# Patient Record
Sex: FEMALE | Race: WHITE | NOT HISPANIC OR LATINO | Employment: OTHER | URBAN - METROPOLITAN AREA
[De-identification: names, ages, dates, MRNs, and addresses within clinical notes are randomized per-mention and may not be internally consistent; named-entity substitution may affect disease eponyms.]

---

## 2017-04-18 ENCOUNTER — OFFICE VISIT (OUTPATIENT)
Dept: DERMATOLOGY | Facility: CLINIC | Age: 27
End: 2017-04-18
Payer: COMMERCIAL

## 2017-04-18 VITALS — OXYGEN SATURATION: 100 % | HEART RATE: 63 BPM | DIASTOLIC BLOOD PRESSURE: 77 MMHG | SYSTOLIC BLOOD PRESSURE: 113 MMHG

## 2017-04-18 DIAGNOSIS — L70.0 ACNE VULGARIS: Primary | ICD-10-CM

## 2017-04-18 PROCEDURE — 99203 OFFICE O/P NEW LOW 30 MIN: CPT | Performed by: PHYSICIAN ASSISTANT

## 2017-04-18 RX ORDER — TRETINOIN 0.25 MG/G
CREAM TOPICAL
Qty: 45 G | Refills: 11 | Status: SHIPPED | OUTPATIENT
Start: 2017-04-18 | End: 2019-05-07

## 2017-04-18 RX ORDER — DOXYCYCLINE 100 MG/1
CAPSULE ORAL
Qty: 60 CAPSULE | Refills: 2 | Status: SHIPPED | OUTPATIENT
Start: 2017-04-18 | End: 2018-03-13

## 2017-04-18 RX ORDER — SPIRONOLACTONE 100 MG/1
100 TABLET, FILM COATED ORAL DAILY
Qty: 90 TABLET | Refills: 1 | Status: SHIPPED | OUTPATIENT
Start: 2017-04-18 | End: 2017-08-24

## 2017-04-18 NOTE — PATIENT INSTRUCTIONS
Directions for acne:    AM    1. Wash with gentle cleanser - Cetaphil, CeraVe, Neutrogena Ultra Gentle  2. Moisturizer with SPF  3. Makeup    PM    1. Wash again  2. Tretinoin 0.025% cream - pea size to entire face  3. Moisturizer over    Start doxycycline (antibiotic) - twice per day with food and full glass of water for 3 months, then stop  Start spironolactone (blood pressure/anti-testosterone pill) - once per day

## 2017-04-18 NOTE — MR AVS SNAPSHOT
"              After Visit Summary   4/18/2017    Kaci Reyes    MRN: [id number]           Patient Information     Date Of Birth          1990        Visit Information        Provider Department      4/18/2017 8:15 AM Geeta Gallagher PA-C [facility]        Today's Diagnoses     Acne vulgaris    -  1      Care Instructions    Directions for acne:    AM    1. Wash with gentle cleanser - Cetaphil, CeraVe, Neutrogena Ultra Gentle  2. Moisturizer with SPF  3. Makeup    PM    1. Wash again  2. Tretinoin 0.025% cream - pea size to entire face  3. Moisturizer over    Start doxycycline (antibiotic) - twice per day with food and full glass of water for 3 months, then stop  Start spironolactone (blood pressure/anti-testosterone pill) - once per day        Follow-ups after your visit        Who to contact     If you have questions or need follow up information about today's clinic visit or your schedule please contact [facility] directly at [phone].  Normal or non-critical lab and imaging results will be communicated to you by CloudOnhart, letter or phone within 4 business days after the clinic has received the results. If you do not hear from us within 7 days, please contact the clinic through Handpayt or phone. If you have a critical or abnormal lab result, we will notify you by phone as soon as possible.  Submit refill requests through [employer]ux[employer] or call your pharmacy and they will forward the refill request to us. Please allow 3 business days for your refill to be completed.          Additional Information About Your Visit        [employer] Information     [employer]ux[employer] lets you send messages to your doctor, view your test results, renew your prescriptions, schedule appointments and more. To sign up, go to [email or web address] . Click on \"Log in\" on the left side of the screen, which will take you to the Welcome page. Then click on \"Sign up Now\" on the right side of the " page.     You will be asked to enter the access code listed below, as well as some personal information. Please follow the directions to create your username and password.     Your access code is: [email or web address]  Expires: 2017  8:39 AM     Your access code will  in 90 days. If you need help or a new code, please call your [place] clinic or [phone].        Care EveryWhere ID     This is your Care EveryWhere ID. This could be used by other organizations to access your [place] medical records  [id number]        Your Vitals Were     Pulse Pulse Oximetry                63 100%           Blood Pressure from Last 3 Encounters:   17 113/77    Weight from Last 3 Encounters:   No data found for Wt              Today, you had the following     No orders found for display         Today's Medication Changes          These changes are accurate as of: 17  8:39 AM.  If you have any questions, ask your nurse or doctor.               Start taking these medicines.        Dose/Directions    doxycycline Monohydrate 100 MG Caps   Used for:  Acne vulgaris   Started by:  Geeta Gallagher PA-C        1 tab PO BID with food and full glass of water   Quantity:  60 capsule   Refills:  2       spironolactone 100 MG tablet   Commonly known as:  ALDACTONE   Used for:  Acne vulgaris   Started by:  Geeta Gallagher PA-C        Dose:  100 mg   Take 1 tablet (100 mg) by mouth daily   Quantity:  90 tablet   Refills:  1       tretinoin 0.025 % cream   Commonly known as:  RETIN-A   Used for:  Acne vulgaris   Started by:  Geeta Gallagher PA-C        Spread a pea size amount into affected area topically at bedtime.  Use sunscreen SPF>20.   Quantity:  45 g   Refills:  11            Where to get your medicines      These medications were sent to [id number] - [place], [place] - [address] AT [facility] [address] [facility] [address]    Hours:  24-hours Phone:  [phone]      doxycycline Monohydrate 100 MG Caps    spironolactone 100 MG tablet    tretinoin 0.025 % cream                Primary Care Provider    None Specified       No primary provider on file.        Thank you!     Thank you for choosing [facility]  for your care. Our goal is always to provide you with excellent care. Hearing back from our patients is one way we can continue to improve our services. Please take a few minutes to complete the written survey that you may receive in the mail after your visit with us. Thank you!             Your Updated Medication List - Protect others around you: Learn how to safely use, store and throw away your medicines at www.disposemymeds.org.          This list is accurate as of: 4/18/17  8:39 AM.  Always use your most recent med list.                   Brand Name Dispense Instructions for use    doxycycline Monohydrate 100 MG Caps     60 capsule    1 tab PO BID with food and full glass of water       spironolactone 100 MG tablet    ALDACTONE    90 tablet    Take 1 tablet (100 mg) by mouth daily       tretinoin 0.025 % cream    RETIN-A    45 g    Spread a pea size amount into affected area topically at bedtime.  Use sunscreen SPF>20.

## 2017-04-18 NOTE — PROGRESS NOTES
HPI:   Kaci Reyes is a 26 year old female who presents for acne.  chief complaint  Condition has been present for: many years but has gotten worse over the past 6 months  Pt complains of pain: Yes     Previous treatments include: OTC only  Menstrual cycles are regular: Yes   Condition flares around period: Yes - has Mirena IUD  Areas Involved: face  IPLEDGE #:   School:   No current outpatient prescriptions on file.     Allergies   Allergen Reactions     Augmentin Hives     Denies any other skin complaints, in general feels well: Yes  Review of symptoms otherwise negative:Yes    PHYSICAL EXAM:   A&Ox3: Yes   Well developed/well nourished female Yes   Mood appropriate Yes        Type 2 skin. Mood appropriate  Alert and Oriented X 3. Well developed, well nourished in no distress.  General appearance: Normal  Head including face: Normal  Eyes: conjunctiva and lids: Normal  Mouth: Lips, teeth, gums: Normal  Neck: Normal  Back: clear  Cardiovascular: Exam of peripheral vascular system by observation for swelling, varicosities, edema: Normal  Extremities: digits/nails (clubbing): Normal  Right upper extremity: Normal  Left upper extremity: Normal  Right lower extremity: Normal  Left lower extremity: Normal  Skin: Scalp and body hair: See below     Comedones Papules/Pustules Cysts Staining Scarring   Face/Neck 1+ 3 0 1+ 0   Chest 0 0 0 0 0   Back 0 0 0 0 0     Telangiectasias: No Fixed Erythema: No Exoriations: No   Other Physical Exam Findings:    ASSESSMENT & PLAN:     1. Acne Vulgaris - advised on diagnosis and treatment options. Discussed use of topical medications and antibiotics. Tends to flare with menses; has the Mirena IUD. Tends to be more dry than oily; not overly sensitive. Discussed spironolactone, abx and topicals - she is amenable to all.   --Start spironolactone 100 mg daily. Advised on potential for hypotension, teratogenetic effects, menstrual irregularities, hyperkalemia and need for Q 6 month K+  checks.   --Start doxycycline 100 mg BID x 3 months. Advised to take with food. Discussed risk of GI upset, esophagitis and photosensitivity.   --Start tretinoin 0.025% cream QHS          Pt advised on use and risks including photosensitivity, allergic reactions, GI upset, headaches, nausea, erythema, scaling, vertigo, asthralgias, blood clots:Yes    Follow-up: 2 months  CC:   Scribed By: Geeta Gallagher MS, PA-C

## 2017-06-20 ENCOUNTER — OFFICE VISIT (OUTPATIENT)
Dept: DERMATOLOGY | Facility: CLINIC | Age: 27
End: 2017-06-20
Payer: COMMERCIAL

## 2017-06-20 VITALS — OXYGEN SATURATION: 99 % | DIASTOLIC BLOOD PRESSURE: 65 MMHG | HEART RATE: 58 BPM | SYSTOLIC BLOOD PRESSURE: 98 MMHG

## 2017-06-20 DIAGNOSIS — L70.0 ACNE VULGARIS: Primary | ICD-10-CM

## 2017-06-20 PROCEDURE — 99213 OFFICE O/P EST LOW 20 MIN: CPT | Performed by: PHYSICIAN ASSISTANT

## 2017-06-20 RX ORDER — TRETINOIN 0.5 MG/G
CREAM TOPICAL
Qty: 45 G | Refills: 11 | Status: SHIPPED | OUTPATIENT
Start: 2017-06-20 | End: 2019-05-07

## 2017-06-20 RX ORDER — SULFAMETHOXAZOLE/TRIMETHOPRIM 800-160 MG
TABLET ORAL
Qty: 60 TABLET | Refills: 2 | Status: SHIPPED | OUTPATIENT
Start: 2017-06-20 | End: 2018-03-13

## 2017-06-20 RX ORDER — SPIRONOLACTONE 50 MG/1
50 TABLET, FILM COATED ORAL 2 TIMES DAILY
Qty: 90 TABLET | Refills: 1 | Status: SHIPPED | OUTPATIENT
Start: 2017-06-20 | End: 2017-12-19

## 2017-06-20 NOTE — MR AVS SNAPSHOT
"              After Visit Summary   6/20/2017    Kaci Reyes    MRN: [id number]           Patient Information     Date Of Birth          1990        Visit Information        Provider Department      6/20/2017 8:15 AM Geeta Gallagher PA-C [facility]        Today's Diagnoses     Acne vulgaris    -  1       Follow-ups after your visit        Your next 10 appointments already scheduled     Aug 24, 2017  8:00 AM CDT   [facility] Visit with Geeta Gallagher PA-C   [facility]    [address] [id number] [phone]              Who to contact     If you have questions or need follow up information about today's clinic visit or your schedule please contact [facility] directly at [phone].  Normal or non-critical lab and imaging results will be communicated to you by MyChart, letter or phone within 4 business days after the clinic has received the results. If you do not hear from us within 7 days, please contact the clinic through MyChart or phone. If you have a critical or abnormal lab result, we will notify you by phone as soon as possible.  Submit refill requests through [employer] or call your pharmacy and they will forward the refill request to us. Please allow 3 business days for your refill to be completed.          Additional Information About Your Visit        MyChart Information     [employer] lets you send messages to your doctor, view your test results, renew your prescriptions, schedule appointments and more. To sign up, go to [email or web address] . Click on \"Log in\" on the left side of the screen, which will take you to the Welcome page. Then click on \"Sign up Now\" on the right side of the page.     You will be asked to enter the access code listed below, as well as some personal information. Please follow the directions to create your username and password.     Your " access code is: [email or web address]  Expires: 2017  8:39 AM     Your access code will  in 90 days. If you need help or a new code, please call your [place] clinic or [phone].        Care EveryWhere ID     This is your Care EveryWhere ID. This could be used by other organizations to access your [place] medical records  [id number]        Your Vitals Were     Pulse Pulse Oximetry                58 99%           Blood Pressure from Last 3 Encounters:   17 98/65   17 113/77    Weight from Last 3 Encounters:   No data found for Wt              Today, you had the following     No orders found for display         Today's Medication Changes          These changes are accurate as of: 17 10:32 AM.  If you have any questions, ask your nurse or doctor.               Start taking these medicines.        Dose/Directions    sulfamethoxazole-trimethoprim 800-160 MG per tablet   Commonly known as:  BACTRIM DS   Used for:  Acne vulgaris   Started by:  Geeta Gallagher PA-C        1 tab PO BID   Quantity:  60 tablet   Refills:  2         These medicines have changed or have updated prescriptions.        Dose/Directions    * spironolactone 100 MG tablet   Commonly known as:  ALDACTONE   This may have changed:  Another medication with the same name was added. Make sure you understand how and when to take each.   Used for:  Acne vulgaris   Changed by:  Geeta Gallagher PA-C        Dose:  100 mg   Take 1 tablet (100 mg) by mouth daily   Quantity:  90 tablet   Refills:  1       * spironolactone 50 MG tablet   Commonly known as:  ALDACTONE   This may have changed:  You were already taking a medication with the same name, and this prescription was added. Make sure you understand how and when to take each.   Used for:  Acne vulgaris   Changed by:  Geeta Gallagher PA-C        Dose:  50 mg   Take 1 tablet (50 mg) by mouth 2 times daily   Quantity:  90 tablet   Refills:  1       * tretinoin 0.025 % cream    Commonly known as:  RETIN-A   This may have changed:  Another medication with the same name was added. Make sure you understand how and when to take each.   Used for:  Acne vulgaris   Changed by:  Geeta Gallagher PA-C        Spread a pea size amount into affected area topically at bedtime.  Use sunscreen SPF>20.   Quantity:  45 g   Refills:  11       * tretinoin 0.05 % cream   Commonly known as:  RETIN-A   This may have changed:  You were already taking a medication with the same name, and this prescription was added. Make sure you understand how and when to take each.   Used for:  Acne vulgaris   Changed by:  Geeta Gallagher PA-C        Spread a pea size amount into affected area topically at bedtime.  Use sunscreen SPF>20.   Quantity:  45 g   Refills:  11       * Notice:  This list has 4 medication(s) that are the same as other medications prescribed for you. Read the directions carefully, and ask your doctor or other care provider to review them with you.         Where to get your medicines      These medications were sent to [facility] [address] [place] [address] [address] [facility] [address]  [address] [facility] [address]    Hours:  24-hours Phone:  [phone]     spironolactone 50 MG tablet    sulfamethoxazole-trimethoprim 800-160 MG per tablet    tretinoin 0.05 % cream                Primary Care Provider    None Specified       No primary provider on file.        Thank you!     Thank you for choosing [facility]  for your care. Our goal is always to provide you with excellent care. Hearing back from our patients is one way we can continue to improve our services. Please take a few minutes to complete the written survey that you may receive in the mail after your visit with us. Thank you!             Your Updated Medication List - Protect others around you: Learn how to safely use, store and throw away your medicines at  www.disposemymeds.org.          This list is accurate as of: 6/20/17 10:32 AM.  Always use your most recent med list.                   Brand Name Dispense Instructions for use    doxycycline Monohydrate 100 MG Caps     60 capsule    1 tab PO BID with food and full glass of water       * spironolactone 100 MG tablet    ALDACTONE    90 tablet    Take 1 tablet (100 mg) by mouth daily       * spironolactone 50 MG tablet    ALDACTONE    90 tablet    Take 1 tablet (50 mg) by mouth 2 times daily       sulfamethoxazole-trimethoprim 800-160 MG per tablet    BACTRIM DS    60 tablet    1 tab PO BID       * tretinoin 0.025 % cream    RETIN-A    45 g    Spread a pea size amount into affected area topically at bedtime.  Use sunscreen SPF>20.       * tretinoin 0.05 % cream    RETIN-A    45 g    Spread a pea size amount into affected area topically at bedtime.  Use sunscreen SPF>20.       * Notice:  This list has 4 medication(s) that are the same as other medications prescribed for you. Read the directions carefully, and ask your doctor or other care provider to review them with you.

## 2017-06-20 NOTE — PROGRESS NOTES
HPI:   Kaci Reyes is a 26 year old female who presents for recheck of acne. On spironolactone and doxycycline - not seeing much improvement.   chief complaint  Condition has been present for: many years but has gotten worse over the past 8 months  Pt complains of pain: Yes     Previous treatments include: OTC only  Menstrual cycles are regular: Yes   Condition flares around period: Yes - has Mirena IUD  Areas Involved: face  IPLEDGE #:   School:   Current Outpatient Prescriptions   Medication Sig Dispense Refill     spironolactone (ALDACTONE) 50 MG tablet Take 1 tablet (50 mg) by mouth 2 times daily 90 tablet 1     sulfamethoxazole-trimethoprim (BACTRIM DS) 800-160 MG per tablet 1 tab PO BID 60 tablet 2     tretinoin (RETIN-A) 0.05 % cream Spread a pea size amount into affected area topically at bedtime.  Use sunscreen SPF>20. 45 g 11     tretinoin (RETIN-A) 0.025 % cream Spread a pea size amount into affected area topically at bedtime.  Use sunscreen SPF>20. 45 g 11     spironolactone (ALDACTONE) 100 MG tablet Take 1 tablet (100 mg) by mouth daily 90 tablet 1     doxycycline Monohydrate 100 MG CAPS 1 tab PO BID with food and full glass of water 60 capsule 2     Allergies   Allergen Reactions     Augmentin Hives     Denies any other skin complaints, in general feels well: Yes  Review of symptoms otherwise negative:Yes    PHYSICAL EXAM:   A&Ox3: Yes   Well developed/well nourished female Yes   Mood appropriate Yes        Type 2 skin. Mood appropriate  Alert and Oriented X 3. Well developed, well nourished in no distress.  General appearance: Normal  Head including face: Normal  Eyes: conjunctiva and lids: Normal  Mouth: Lips, teeth, gums: Normal  Neck: Normal  Back: clear  Cardiovascular: Exam of peripheral vascular system by observation for swelling, varicosities, edema: Normal  Extremities: digits/nails (clubbing): Normal  Right upper extremity: Normal  Left upper extremity: Normal  Right lower extremity:  Normal  Left lower extremity: Normal  Skin: Scalp and body hair: See below     Comedones Papules/Pustules Cysts Staining Scarring   Face/Neck 1+ 1+ 2 1+ 0   Chest 0 0 0 0 0   Back 0 0 0 0 0     Telangiectasias: No Fixed Erythema: No Exoriations: No   Other Physical Exam Findings:    ASSESSMENT & PLAN:     1. Acne Vulgaris - advised on diagnosis and treatment options. Currently taking spironolactone 100 mg daily and doxycycline. Not having any adverse effects but not seeing improvement in her acne. Is using tretinoin 0.025% cream every night without irritation. Discussed use of topical medications and antibiotics. Tends to flare with menses; has the Mirena IUD. Tends to be more dry than oily; not overly sensitive. Discussed spironolactone, abx and topicals - she is amenable to all.   --Increase to spironolactone 150 mg daily. She is currently training for a marathon. Discussed need for adequate hydration and potential for hypotension at length. Advised on potential for hypotension, teratogenetic effects, menstrual irregularities, hyperkalemia and need for Q 6 month K+ checks.   --Start Bactrim DS BID x 3 months. Advised on increased risk of allergic reactions.    --Increase to tretinoin 0.05% cream QHS          Pt advised on use and risks including photosensitivity, allergic reactions, GI upset, headaches, nausea, erythema, scaling, vertigo, asthralgias, blood clots:Yes    Follow-up: 2 months  CC:   Scribed By: Geeta Gallagher, MS, PA-C

## 2017-08-24 ENCOUNTER — OFFICE VISIT (OUTPATIENT)
Dept: DERMATOLOGY | Facility: CLINIC | Age: 27
End: 2017-08-24
Payer: COMMERCIAL

## 2017-08-24 DIAGNOSIS — Z51.81 THERAPEUTIC DRUG MONITORING: Primary | ICD-10-CM

## 2017-08-24 DIAGNOSIS — L70.0 ACNE VULGARIS: ICD-10-CM

## 2017-08-24 LAB — POTASSIUM SERPL-SCNC: 4.1 MMOL/L (ref 3.4–5.3)

## 2017-08-24 PROCEDURE — 84132 ASSAY OF SERUM POTASSIUM: CPT | Performed by: PHYSICIAN ASSISTANT

## 2017-08-24 PROCEDURE — 99213 OFFICE O/P EST LOW 20 MIN: CPT | Performed by: PHYSICIAN ASSISTANT

## 2017-08-24 PROCEDURE — 36415 COLL VENOUS BLD VENIPUNCTURE: CPT | Performed by: PHYSICIAN ASSISTANT

## 2017-08-24 RX ORDER — SPIRONOLACTONE 100 MG/1
100 TABLET, FILM COATED ORAL DAILY
Qty: 90 TABLET | Refills: 1 | Status: SHIPPED | OUTPATIENT
Start: 2017-08-24 | End: 2017-12-19

## 2017-08-24 NOTE — MR AVS SNAPSHOT
"              After Visit Summary   8/24/2017    Kaci Reyes    MRN: [id number]           Patient Information     Date Of Birth          1990        Visit Information        Provider Department      8/24/2017 8:00 AM Geeta Gallagher PA-C [facility]        Today's Diagnoses     Therapeutic drug monitoring    -  1    Acne vulgaris           Follow-ups after your visit        Your next 10 appointments already scheduled     Feb 27, 2018 11:45 AM CST   Return Visit with Geeta Gallagher PA-C   [facility]    [address] [id number] [phone]              Who to contact     If you have questions or need follow up information about today's clinic visit or your schedule please contact [facility] directly at [phone].  Normal or non-critical lab and imaging results will be communicated to you by MyChart, letter or phone within 4 business days after the clinic has received the results. If you do not hear from us within 7 days, please contact the clinic through MyChart or phone. If you have a critical or abnormal lab result, we will notify you by phone as soon as possible.  Submit refill requests through [employer] or call your pharmacy and they will forward the refill request to us. Please allow 3 business days for your refill to be completed.          Additional Information About Your Visit        MyChart Information     [employer] lets you send messages to your doctor, view your test results, renew your prescriptions, schedule appointments and more. To sign up, go to [email or web address] . Click on \"Log in\" on the left side of the screen, which will take you to the Welcome page. Then click on \"Sign up Now\" on the right side of the page.     You will be asked to enter the access code listed below, as well as some personal information. Please follow the directions to create " your username and password.     Your access code is: [email or web address]  Expires: 2017  8:19 AM     Your access code will  in 90 days. If you need help or a new code, please call your [place] clinic or [phone].        Care EveryWhere ID     This is your Care EveryWhere ID. This could be used by other organizations to access your [place] medical records  [id number]         Blood Pressure from Last 3 Encounters:   17 98/65   17 113/77    Weight from Last 3 Encounters:   No data found for Wt              We Performed the Following     Potassium  Serum ([facility])          Where to get your medicines      These medications were sent to [facility] - [facility] [address] AT [facility] [address], [facility] [address]    Hours:  24-hours Phone:  [phone]     spironolactone 100 MG tablet          Primary Care Provider    None Specified       No primary provider on file.        Equal Access to Services     [facility]: Hadii aad ku hadasho Soomaali, waaxda luqadaha, qaybta kaalmada adeegyada, waxay idiin hayferminn lm an . So [facility] [phone].    ATENCIÓN: Si habla español, tiene a conrad disposición servicios gratuitos de asistencia lingüística. Llame al [phone].    We comply with applicable federal civil rights laws and [place] laws. We do not discriminate on the basis of race, color, national origin, age, disability sex, sexual orientation or gender identity.            Thank you!     Thank you for choosing [facility]  for your care. Our goal is always to provide you with excellent care. Hearing back from our patients is one way we can continue to improve our services. Please take a few minutes to complete the written survey that you may receive in the mail after your visit with us. Thank you!             Your Updated Medication List - Protect others around you: Learn how to safely use, store and  throw away your medicines at www.disposemymeds.org.          This list is accurate as of: 8/24/17  8:19 AM.  Always use your most recent med list.                   Brand Name Dispense Instructions for use Diagnosis    doxycycline Monohydrate 100 MG Caps     60 capsule    1 tab PO BID with food and full glass of water    Acne vulgaris       * spironolactone 50 MG tablet    ALDACTONE    90 tablet    Take 1 tablet (50 mg) by mouth 2 times daily    Acne vulgaris       * spironolactone 100 MG tablet    ALDACTONE    90 tablet    Take 1 tablet (100 mg) by mouth daily    Acne vulgaris       sulfamethoxazole-trimethoprim 800-160 MG per tablet    BACTRIM DS    60 tablet    1 tab PO BID    Acne vulgaris       * tretinoin 0.025 % cream    RETIN-A    45 g    Spread a pea size amount into affected area topically at bedtime.  Use sunscreen SPF>20.    Acne vulgaris       * tretinoin 0.05 % cream    RETIN-A    45 g    Spread a pea size amount into affected area topically at bedtime.  Use sunscreen SPF>20.    Acne vulgaris       * Notice:  This list has 4 medication(s) that are the same as other medications prescribed for you. Read the directions carefully, and ask your doctor or other care provider to review them with you.

## 2017-08-24 NOTE — PROGRESS NOTES
HPI:   Kaci Reyes is a 26 year old female who presents for recheck of acne. On spironolactone and doxycycline - not seeing much improvement.   chief complaint  Condition has been present for: many years but has gotten worse over the past 8 months  Pt complains of pain: Yes     Previous treatments include: OTC only  Menstrual cycles are regular: Yes   Condition flares around period: Yes - has Mirena IUD  Areas Involved: face  IPLEDGE #:   School:   Current Outpatient Prescriptions   Medication Sig Dispense Refill     spironolactone (ALDACTONE) 50 MG tablet Take 1 tablet (50 mg) by mouth 2 times daily 90 tablet 1     sulfamethoxazole-trimethoprim (BACTRIM DS) 800-160 MG per tablet 1 tab PO BID 60 tablet 2     tretinoin (RETIN-A) 0.05 % cream Spread a pea size amount into affected area topically at bedtime.  Use sunscreen SPF>20. 45 g 11     tretinoin (RETIN-A) 0.025 % cream Spread a pea size amount into affected area topically at bedtime.  Use sunscreen SPF>20. 45 g 11     spironolactone (ALDACTONE) 100 MG tablet Take 1 tablet (100 mg) by mouth daily 90 tablet 1     doxycycline Monohydrate 100 MG CAPS 1 tab PO BID with food and full glass of water 60 capsule 2     Allergies   Allergen Reactions     Augmentin Hives     Denies any other skin complaints, in general feels well: Yes  Review of symptoms otherwise negative:Yes    PHYSICAL EXAM:   A&Ox3: Yes   Well developed/well nourished female Yes   Mood appropriate Yes        Type 2 skin. Mood appropriate  Alert and Oriented X 3. Well developed, well nourished in no distress.  General appearance: Normal  Head including face: Normal  Eyes: conjunctiva and lids: Normal  Mouth: Lips, teeth, gums: Normal  Neck: Normal  Back: clear  Cardiovascular: Exam of peripheral vascular system by observation for swelling, varicosities, edema: Normal  Extremities: digits/nails (clubbing): Normal  Right upper extremity: Normal  Left upper extremity: Normal  Right lower extremity:  Normal  Left lower extremity: Normal  Skin: Scalp and body hair: See below     Comedones Papules/Pustules Cysts Staining Scarring   Face/Neck 0-1+ 3 0 0-1+ 0   Chest 0 0 0 0 0   Back 0 0 0 0 0     Telangiectasias: No Fixed Erythema: No Exoriations: No   Other Physical Exam Findings:    ASSESSMENT & PLAN:     1. Acne Vulgaris - advised on diagnosis and treatment options. Improved on the spironolactone 150 mg. Stopped bactrim a few weeks ago due to yeast infections.  Is using tretinoin 0.05% cream every night without irritation. Discussed use of topical medications and antibiotics. No longer flares with menses; has the Mirena IUD. Tends to be more dry than oily; not overly sensitive. Discussed spironolactone, abx and topicals - she is amenable to all.   --Continue spironolactone 150 mg daily. She is currently training for a marathon. Discussed need for adequate hydration and potential for hypotension at length. Advised on potential for hypotension, teratogenetic effects, menstrual irregularities, hyperkalemia and need for Q 6 month K+ checks.   --Continue tretinoin 0.05% cream QHS  --Can call for abx if flaring; doxy did not work for her.   --Check K+ today          Pt advised on use and risks including photosensitivity, allergic reactions, GI upset, headaches, nausea, erythema, scaling, vertigo, asthralgias, blood clots:Yes    Follow-up: 6 months  CC:   Scribed By: Geeta Gallagher, MS, PA-C

## 2017-11-30 DIAGNOSIS — L70.0 ACNE VULGARIS: ICD-10-CM

## 2017-12-04 RX ORDER — SPIRONOLACTONE 50 MG/1
50 TABLET, FILM COATED ORAL 2 TIMES DAILY
Qty: 90 TABLET | Refills: 1 | OUTPATIENT
Start: 2017-12-04

## 2017-12-04 NOTE — TELEPHONE ENCOUNTER
Duplicate- sent 8/2017 x 6 months- info sent to pharmacy.  Reva JasonRN  [facility]  [phone]    Spironolactone      Last Written Prescription Date: 08/24/17  Last Fill Quantity: 90, # refills: 1  Last Office Visit with [facility], P or [facility] prescribing provider:   Next 5 appointments (look out 90 days)     Feb 27, 2018 11:45 AM CST   Return Visit with Geeta Gallagher PA-C   [facility] ([facility]    [address] [place] [id number] [phone]                   Potassium   Date Value Ref Range Status   08/24/2017 4.1 3.4 - 5.3 mmol/L Final     No results found for: CR  BP Readings from Last 3 Encounters:   06/20/17 98/65   04/18/17 113/77

## 2017-12-19 DIAGNOSIS — L70.0 ACNE VULGARIS: ICD-10-CM

## 2017-12-20 RX ORDER — SPIRONOLACTONE 50 MG/1
50 TABLET, FILM COATED ORAL 2 TIMES DAILY
Qty: 90 TABLET | Refills: 1 | Status: SHIPPED | OUTPATIENT
Start: 2017-12-20 | End: 2019-05-07

## 2017-12-20 RX ORDER — SPIRONOLACTONE 100 MG/1
100 TABLET, FILM COATED ORAL DAILY
Qty: 90 TABLET | Refills: 1 | Status: SHIPPED | OUTPATIENT
Start: 2017-12-20 | End: 2019-05-07

## 2017-12-20 NOTE — TELEPHONE ENCOUNTER
Requested Prescriptions   Pending Prescriptions Disp Refills     spironolactone (ALDACTONE) 100 MG tablet 90 tablet 1     Sig: Take 1 tablet (100 mg) by mouth daily    Diuretics (Including Combos) Protocol Failed    12/19/2017  4:42 PM       Failed - Blood pressure under 140/90    BP Readings from Last 3 Encounters:   06/20/17 98/65   04/18/17 113/77                Failed - Normal serum creatinine on file in past 12 months    No lab results found.          Failed - Normal serum sodium on file in past 12 months    No lab results found.          Passed - Recent or future visit with authorizing provider's specialty    Patient had office visit in the last year or has a visit in the next 30 days with authorizing provider.  See chart review.              Passed - Patient is age 18 or older       Passed - No active pregancy on record       Passed - Normal serum potassium on file in past 12 months    Recent Labs   Lab Test  08/24/17   0829   POTASSIUM  4.1                   Passed - No positive pregnancy test in past 12 months        spironolactone (ALDACTONE) 50 MG tablet 90 tablet 1     Sig: Take 1 tablet (50 mg) by mouth 2 times daily    Diuretics (Including Combos) Protocol Failed    12/19/2017  4:42 PM       Failed - Blood pressure under 140/90    BP Readings from Last 3 Encounters:   06/20/17 98/65   04/18/17 113/77                Failed - Normal serum creatinine on file in past 12 months    No lab results found.          Failed - Normal serum sodium on file in past 12 months    No lab results found.          Passed - Recent or future visit with authorizing provider's specialty    Patient had office visit in the last year or has a visit in the next 30 days with authorizing provider.  See chart review.              Passed - Patient is age 18 or older       Passed - No active pregancy on record       Passed - Normal serum potassium on file in past 12 months    Recent Labs   Lab Test  08/24/17   0829   POTASSIUM  4.1                    Passed - No positive pregnancy test in past 12 months

## 2018-03-13 ENCOUNTER — OFFICE VISIT (OUTPATIENT)
Dept: DERMATOLOGY | Facility: CLINIC | Age: 28
End: 2018-03-13
Payer: COMMERCIAL

## 2018-03-13 VITALS — DIASTOLIC BLOOD PRESSURE: 72 MMHG | HEART RATE: 78 BPM | SYSTOLIC BLOOD PRESSURE: 110 MMHG | OXYGEN SATURATION: 99 %

## 2018-03-13 DIAGNOSIS — L70.0 ACNE VULGARIS: Primary | ICD-10-CM

## 2018-03-13 DIAGNOSIS — Z51.81 THERAPEUTIC DRUG MONITORING: ICD-10-CM

## 2018-03-13 LAB — BETA HCG QUAL IFA URINE: NEGATIVE

## 2018-03-13 PROCEDURE — 84703 CHORIONIC GONADOTROPIN ASSAY: CPT | Performed by: PHYSICIAN ASSISTANT

## 2018-03-13 PROCEDURE — 99213 OFFICE O/P EST LOW 20 MIN: CPT | Performed by: PHYSICIAN ASSISTANT

## 2018-03-13 NOTE — PROGRESS NOTES
HPI:   Kaci Reyes is a 27 year old female who presents for recheck of acne. Improved on spironolactone. No side effects from spironolactone.    chief complaint  Condition has been present for: many years but has gotten worse over the past 8 months  Pt complains of pain: Yes     Previous treatments include: OTC only  Menstrual cycles are regular: Yes   Condition flares around period: Yes - has Mirena IUD  Areas Involved: face  IPLEDGE #: [id number]  School:   Current Outpatient Prescriptions   Medication Sig Dispense Refill     spironolactone (ALDACTONE) 100 MG tablet Take 1 tablet (100 mg) by mouth daily 90 tablet 1     spironolactone (ALDACTONE) 50 MG tablet Take 1 tablet (50 mg) by mouth 2 times daily 90 tablet 1     tretinoin (RETIN-A) 0.05 % cream Spread a pea size amount into affected area topically at bedtime.  Use sunscreen SPF>20. 45 g 11     tretinoin (RETIN-A) 0.025 % cream Spread a pea size amount into affected area topically at bedtime.  Use sunscreen SPF>20. 45 g 11     Allergies   Allergen Reactions     Augmentin Hives     Denies any other skin complaints, in general feels well: Yes  Review of symptoms otherwise negative:Yes    This document serves as a record of the services and decisions personally performed and made by Geeta Gallagher, MS, PA-C. It was created on her behalf by Rose Mary Wild, a trained medical scribe. The creation of this document is based on the provider's statements to the medical scribe.  Rose Mary Wild 10:16 AM March 13, 2018    PHYSICAL EXAM:   A&Ox3: Yes   Well developed/well nourished female Yes   Mood appropriate Yes        Type 2 skin. Mood appropriate  Alert and Oriented X 3. Well developed, well nourished in no distress.  General appearance: Normal  Head including face: Normal  Eyes: conjunctiva and lids: Normal  Mouth: Lips, teeth, gums: Normal  Neck: Normal  Back: clear  Cardiovascular: Exam of peripheral vascular system by observation for swelling,  varicosities, edema: Normal  Extremities: digits/nails (clubbing): Normal  Right upper extremity: Normal  Left upper extremity: Normal  Right lower extremity: Normal  Left lower extremity: Normal  Skin: Scalp and body hair: See below     Comedones Papules/Pustules Cysts Staining Scarring   Face/Neck 0-1+ 3 0 0-1+ 0   Chest 0 0 0 0 0   Back 0 0 0 0 0     Telangiectasias: No Fixed Erythema: No Exoriations: No   Other Physical Exam Findings:    ASSESSMENT & PLAN:     1. Acne Vulgaris - advised on diagnosis and treatment options. Improved on the spironolactone 150 mg but still getting acne monthly with menses. Is using tretinoin 0.05% cream every night without irritation. Discussed use of topical medications and antibiotics. has the Mirena IUD.  Discussed continuing spironolactone, Accutane, abx and topicals - she will consider Accutane - will register today and make final decision in 30 days.     Accutane is discussed fully with the patient. It is a very effective drug to treat acne vulgaris but has many significant side effects. Chief among these are teratogensis, hepatic injury, dyslipidemia and severe drying of the mucous membranes. All of these issues have been discussed in details. Monthly blood tests to monitor lipids and liver functions will be necessary. Expect painful dryness and/or fissuring around the lips, eyes, and other moist areas of the body. Balms may be protective. Contact lens may be too painful to wear temporarily while on this drug. Episodes of significant depression have been reported, including suicidal ideation and attempts in rare cases. It may also cause pseudotumor cerebri and hyperostosis. The patient will report any such changes in mood, depressive symptoms or suicidal thoughts, headaches, joint or bone pains.    Female patients MUST use two simultaneous methods of family planning. Accutane is Category X for pregnancy, meaning it will cause fetal teratogenic malformations, and pregnancy  MUST be avoided while on this drug.    The dose is 0.5-1 mg/kg in two divided doses for 15-20 weeks.    After discussion of these important issues, s/he indicates complete understanding of all of the above, and does wish to proceed with accutane therapy at this time.     --Continue spironolactone 150 mg daily. Discussed need for adequate hydration and potential for hypotension at length. Advised on potential for hypotension, teratogenetic effects, menstrual irregularities, hyperkalemia and need for Q 6 month K+ checks. (will stop if she starts Accutane)  --Continue tretinoin 0.05% cream QHS  --Registered for iPledge today   --Check UCG today, repeat in 1 month   --Follow up in 30+ days   --Ipledge number is [id number]      Pt advised on use and risks including photosensitivity, allergic reactions, GI upset, headaches, nausea, erythema, scaling, vertigo, asthralgias, blood clots:Yes    Follow-up: 1 month  CC:   Scribed By: Rose Mary Wild Medical Scribe    The information in this document, created by the medical scribe for me, accurately reflects the services I personally performed and the decisions made by me. I have reviewed and approved this document for accuracy prior to leaving the patient care area.  March 13, 2018 10:27 AM    Geeta Gallagher MS, PATIA

## 2018-03-13 NOTE — MR AVS SNAPSHOT
"              After Visit Summary   3/13/2018    Kaci Reyes    MRN: [id number]           Patient Information     Date Of Birth          1990        Visit Information        Provider Department      3/13/2018 10:00 AM Geeta Gallagher PA-C [facility]        Today's Diagnoses     Acne vulgaris    -  1    Therapeutic drug monitoring           Follow-ups after your visit        Your next 10 appointments already scheduled     Apr 13, 2018  9:00 AM CDT   Return Visit with Geeta Gallagher PA-C   [facility] ([facility])    [address] [phone]            May 11, 2018  9:00 AM CDT   Return Visit with Geeta Gallagher PA-C   [facility] ([facility])    [address] [place] [phone]              Who to contact     If you have questions or need follow up information about today's clinic visit or your schedule please contact [facility] directly at [phone].  Normal or non-critical lab and imaging results will be communicated to you by MyChart, letter or phone within 4 business days after the clinic has received the results. If you do not hear from us within 7 days, please contact the clinic through MyChart or phone. If you have a critical or abnormal lab result, we will notify you by phone as soon as possible.  Submit refill requests through [employer] or call your pharmacy and they will forward the refill request to us. Please allow 3 business days for your refill to be completed.          Additional Information About Your Visit        MyChart Information     H?REL lets you send messages to your doctor, view your test results, renew your prescriptions, schedule appointments and more. To sign up, go to [email or web address] . Click on \"Log in\" on the left side of the screen, which will take " "you to the Welcome page. Then click on \"Sign up Now\" on the right side of the page.     You will be asked to enter the access code listed below, as well as some personal information. Please follow the directions to create your username and password.     Your access code is: [email or web address]  Expires: 2018  1:46 PM     Your access code will  in 90 days. If you need help or a new code, please call your [facility] clinic or [phone].        Care EveryWhere ID     This is your Care EveryWhere ID. This could be used by other organizations to access your [facility] medical records  [id number]        Your Vitals Were     Pulse Last Period Pulse Oximetry             78 2018 99%          Blood Pressure from Last 3 Encounters:   18 110/72   17 98/65   17 113/77    Weight from Last 3 Encounters:   No data found for Wt              We Performed the Following     Beta HCG qual IFA urine        Primary Care Provider    None Specified       No primary provider on file.        Equal Access to Services     [facility]: Hadii rachana miller hadasho Sowaltali, waaxda luqadaha, qaybta kaalmada adeboaz, samuel an . So [facility] [phone].    ATENCIÓN: Si habla español, tiene a conrad disposición servicios gratuitos de asistencia lingüística. Llame al [phone].    We comply with applicable federal civil rights laws and [place] laws. We do not discriminate on the basis of race, color, national origin, age, disability, sex, sexual orientation, or gender identity.            Thank you!     Thank you for choosing [facility]  for your care. Our goal is always to provide you with excellent care. Hearing back from our patients is one way we can continue to improve our services. Please take a few minutes to complete the written survey that you may receive in the mail after your visit with us. Thank you!             Your Updated Medication List - Protect others " around you: Learn how to safely use, store and throw away your medicines at www.disposemymeds.org.          This list is accurate as of 3/13/18  1:46 PM.  Always use your most recent med list.                   Brand Name Dispense Instructions for use Diagnosis    * spironolactone 100 MG tablet    ALDACTONE    90 tablet    Take 1 tablet (100 mg) by mouth daily    Acne vulgaris       * spironolactone 50 MG tablet    ALDACTONE    90 tablet    Take 1 tablet (50 mg) by mouth 2 times daily    Acne vulgaris       * tretinoin 0.025 % cream    RETIN-A    45 g    Spread a pea size amount into affected area topically at bedtime.  Use sunscreen SPF>20.    Acne vulgaris       * tretinoin 0.05 % cream    RETIN-A    45 g    Spread a pea size amount into affected area topically at bedtime.  Use sunscreen SPF>20.    Acne vulgaris       * Notice:  This list has 4 medication(s) that are the same as other medications prescribed for you. Read the directions carefully, and ask your doctor or other care provider to review them with you.

## 2018-04-13 ENCOUNTER — TELEPHONE (OUTPATIENT)
Dept: DERMATOLOGY | Facility: CLINIC | Age: 28
End: 2018-04-13

## 2018-04-13 ENCOUNTER — OFFICE VISIT (OUTPATIENT)
Dept: DERMATOLOGY | Facility: CLINIC | Age: 28
End: 2018-04-13
Payer: COMMERCIAL

## 2018-04-13 VITALS — DIASTOLIC BLOOD PRESSURE: 70 MMHG | OXYGEN SATURATION: 99 % | SYSTOLIC BLOOD PRESSURE: 116 MMHG | HEART RATE: 77 BPM

## 2018-04-13 DIAGNOSIS — Z51.81 THERAPEUTIC DRUG MONITORING: ICD-10-CM

## 2018-04-13 DIAGNOSIS — L70.0 ACNE VULGARIS: Primary | ICD-10-CM

## 2018-04-13 LAB
ALBUMIN SERPL-MCNC: 4 G/DL (ref 3.4–5)
ALP SERPL-CCNC: 66 U/L (ref 40–150)
ALT SERPL W P-5'-P-CCNC: 17 U/L (ref 0–50)
ANION GAP SERPL CALCULATED.3IONS-SCNC: 6 MMOL/L (ref 3–14)
AST SERPL W P-5'-P-CCNC: 28 U/L (ref 0–45)
BETA HCG QUAL IFA URINE: NEGATIVE
BILIRUB SERPL-MCNC: 1 MG/DL (ref 0.2–1.3)
BUN SERPL-MCNC: 11 MG/DL (ref 7–30)
CALCIUM SERPL-MCNC: 9.1 MG/DL (ref 8.5–10.1)
CHLORIDE SERPL-SCNC: 108 MMOL/L (ref 94–109)
CHOLEST SERPL-MCNC: 171 MG/DL
CO2 SERPL-SCNC: 26 MMOL/L (ref 20–32)
CREAT SERPL-MCNC: 0.96 MG/DL (ref 0.52–1.04)
ERYTHROCYTE [DISTWIDTH] IN BLOOD BY AUTOMATED COUNT: 11.3 % (ref 10–15)
GFR SERPL CREATININE-BSD FRML MDRD: 70 ML/MIN/1.7M2
GLUCOSE SERPL-MCNC: 79 MG/DL (ref 70–99)
HCT VFR BLD AUTO: 42.1 % (ref 35–47)
HDLC SERPL-MCNC: 56 MG/DL
HGB BLD-MCNC: 14.4 G/DL (ref 11.7–15.7)
LDLC SERPL CALC-MCNC: 105 MG/DL
MCH RBC QN AUTO: 33.3 PG (ref 26.5–33)
MCHC RBC AUTO-ENTMCNC: 34.2 G/DL (ref 31.5–36.5)
MCV RBC AUTO: 97 FL (ref 78–100)
NONHDLC SERPL-MCNC: 115 MG/DL
PLATELET # BLD AUTO: 190 10E9/L (ref 150–450)
POTASSIUM SERPL-SCNC: 4 MMOL/L (ref 3.4–5.3)
PROT SERPL-MCNC: 7.8 G/DL (ref 6.8–8.8)
RBC # BLD AUTO: 4.33 10E12/L (ref 3.8–5.2)
SODIUM SERPL-SCNC: 140 MMOL/L (ref 133–144)
TRIGL SERPL-MCNC: 50 MG/DL
WBC # BLD AUTO: 5.3 10E9/L (ref 4–11)

## 2018-04-13 PROCEDURE — 84703 CHORIONIC GONADOTROPIN ASSAY: CPT | Performed by: PHYSICIAN ASSISTANT

## 2018-04-13 PROCEDURE — 80061 LIPID PANEL: CPT | Performed by: PHYSICIAN ASSISTANT

## 2018-04-13 PROCEDURE — 80053 COMPREHEN METABOLIC PANEL: CPT | Performed by: PHYSICIAN ASSISTANT

## 2018-04-13 PROCEDURE — 85027 COMPLETE CBC AUTOMATED: CPT | Performed by: PHYSICIAN ASSISTANT

## 2018-04-13 PROCEDURE — 99213 OFFICE O/P EST LOW 20 MIN: CPT | Performed by: PHYSICIAN ASSISTANT

## 2018-04-13 PROCEDURE — 36415 COLL VENOUS BLD VENIPUNCTURE: CPT | Performed by: PHYSICIAN ASSISTANT

## 2018-04-13 RX ORDER — ISOTRETINOIN 30 MG/1
CAPSULE ORAL
Qty: 30 CAPSULE | Refills: 0 | Status: SHIPPED | OUTPATIENT
Start: 2018-04-13 | End: 2019-05-07

## 2018-04-13 NOTE — TELEPHONE ENCOUNTER
Called and spoke with patient letting her know that all of her labs came back within acceptable limits to start Accutane. Patient voiced understanding.  Bianca RN-BSN  [facility]  [phone]

## 2018-04-13 NOTE — TELEPHONE ENCOUNTER
Notes Recorded by Geeta Gallagher PA-C on 4/13/2018 at 12:43 PM  CBC, CMP and lipid panel area all within acceptable limits to start Accutane. Please notify patient.

## 2018-04-13 NOTE — MR AVS SNAPSHOT
After Visit Summary   4/13/2018    Kaci Reyes    MRN: [id number]           Patient Information     Date Of Birth          1990        Visit Information        Provider Department      4/13/2018 9:00 AM Geeta Gallagher PA-C [facility]        Today's Diagnoses     Acne vulgaris    -  1      Care Instructions    Start 1 tablet of Accutane per day with fatty food     Discontinue spironolactone, tretinoin cream and all other acne products.    Only use gentle cleanser, lots of moisturizer and chapstick.     Follow up in 1 month           Follow-ups after your visit        Follow-up notes from your care team     Return in about 4 weeks (around 5/11/2018) for medication recheck .      Your next 10 appointments already scheduled     May 11, 2018  9:00 AM CDT   Return Visit with Geeta Gallagher PA-C   [facility] ([facility])    [address] [place] [phone]              Who to contact     If you have questions or need follow up information about today's clinic visit or your schedule please contact [facility] directly at [phone].  Normal or non-critical lab and imaging results will be communicated to you by MyChart, letter or phone within 4 business days after the clinic has received the results. If you do not hear from us within 7 days, please contact the clinic through [employer]hart or phone. If you have a critical or abnormal lab result, we will notify you by phone as soon as possible.  Submit refill requests through AIT or call your pharmacy and they will forward the refill request to us. Please allow 3 business days for your refill to be completed.          Additional Information About Your Visit        [employer] Information     AIT lets you send messages to your doctor, view your test results, renew your prescriptions, schedule appointments and more. To sign  "up, go to [email or web address] . Click on \"Log in\" on the left side of the screen, which will take you to the Welcome page. Then click on \"Sign up Now\" on the right side of the page.     You will be asked to enter the access code listed below, as well as some personal information. Please follow the directions to create your username and password.     Your access code is: [email or web address]  Expires: 2018  1:46 PM     Your access code will  in 90 days. If you need help or a new code, please call your [facility] clinic or [phone].        Care EveryWhere ID     This is your Care EveryWhere ID. This could be used by other organizations to access your [facility] medical records  [id number]        Your Vitals Were     Pulse Last Period Pulse Oximetry Breastfeeding?          77 2018 99% No         Blood Pressure from Last 3 Encounters:   18 116/70   18 110/72   17 98/65    Weight from Last 3 Encounters:   No data found for Wt              Today, you had the following     No orders found for display       Primary Care Provider    None Specified       No primary provider on file.        Equal Access to Services     [facility] : Hadii rachana English, wayomairada ralf, qaybta kaalmada leanna, samuel an . So [facility] [phone].    ATENCIÓN: Si habla español, tiene a conrad disposición servicios gratuitos de asistencia lingüística. Llame al [phone].    We comply with applicable federal civil rights laws and [place] laws. We do not discriminate on the basis of race, color, national origin, age, disability, sex, sexual orientation, or gender identity.            Thank you!     Thank you for choosing [facility]  for your care. Our goal is always to provide you with excellent care. Hearing back from our patients is one way we can continue to improve our services. Please take a few minutes to complete the written survey that you " may receive in the mail after your visit with us. Thank you!             Your Updated Medication List - Protect others around you: Learn how to safely use, store and throw away your medicines at www.disposemymeds.org.          This list is accurate as of 4/13/18  9:21 AM.  Always use your most recent med list.                   Brand Name Dispense Instructions for use Diagnosis    levonorgestrel 20 MCG/24HR IUD    MIRENA     1 each by Intrauterine route        * spironolactone 100 MG tablet    ALDACTONE    90 tablet    Take 1 tablet (100 mg) by mouth daily    Acne vulgaris       * spironolactone 50 MG tablet    ALDACTONE    90 tablet    Take 1 tablet (50 mg) by mouth 2 times daily    Acne vulgaris       * tretinoin 0.025 % cream    RETIN-A    45 g    Spread a pea size amount into affected area topically at bedtime.  Use sunscreen SPF>20.    Acne vulgaris       * tretinoin 0.05 % cream    RETIN-A    45 g    Spread a pea size amount into affected area topically at bedtime.  Use sunscreen SPF>20.    Acne vulgaris       * Notice:  This list has 4 medication(s) that are the same as other medications prescribed for you. Read the directions carefully, and ask your doctor or other care provider to review them with you.

## 2018-04-13 NOTE — PROGRESS NOTES
HPI:   Kaci Reyes is a 27 year old female who presents for recheck of acne and start of Accutane   chief complaint  Condition has been present for: many years but has gotten worse over the past 8 months  Pt complains of pain: Yes     Previous treatments include: OTC only  Menstrual cycles are regular: Yes   Condition flares around period: Yes - has Mirena IUD  Areas Involved: face  IPLEDGE #: [id number]  School:   Current Outpatient Prescriptions   Medication Sig Dispense Refill     levonorgestrel (MIRENA) 20 MCG/24HR IUD 1 each by Intrauterine route       spironolactone (ALDACTONE) 100 MG tablet Take 1 tablet (100 mg) by mouth daily 90 tablet 1     spironolactone (ALDACTONE) 50 MG tablet Take 1 tablet (50 mg) by mouth 2 times daily 90 tablet 1     tretinoin (RETIN-A) 0.05 % cream Spread a pea size amount into affected area topically at bedtime.  Use sunscreen SPF>20. 45 g 11     tretinoin (RETIN-A) 0.025 % cream Spread a pea size amount into affected area topically at bedtime.  Use sunscreen SPF>20. (Patient not taking: Reported on 4/13/2018) 45 g 11     Allergies   Allergen Reactions     Augmentin Hives     Denies any other skin complaints, in general feels well: Yes  Review of symptoms otherwise negative:Yes    This document serves as a record of the services and decisions personally performed and made by Geeta Gallagher, MS, PA-C. It was created on her behalf by Rose Mary Wild, a trained medical scribe. The creation of this document is based on the provider's statements to the medical scribe.  Rose Mary iWld 9:17 AM April 13, 2018    PHYSICAL EXAM:   A&Ox3: Yes   Well developed/well nourished female Yes   Mood appropriate Yes        Type 2 skin. Mood appropriate  Alert and Oriented X 3. Well developed, well nourished in no distress.  General appearance: Normal  Head including face: Normal  Eyes: conjunctiva and lids: Normal  Mouth: Lips, teeth, gums: Normal  Neck: Normal  Back: clear  Cardiovascular:  Exam of peripheral vascular system by observation for swelling, varicosities, edema: Normal  Extremities: digits/nails (clubbing): Normal  Right upper extremity: Normal  Left upper extremity: Normal  Right lower extremity: Normal  Left lower extremity: Normal  Skin: Scalp and body hair: See below     Comedones Papules/Pustules Cysts Staining Scarring   Face/Neck 0-1+ 3 0 0-1+ 0   Chest 0 0 0 0 0   Back 0 0 0 0 0     Telangiectasias: No Fixed Erythema: No Exoriations: No   Other Physical Exam Findings:    ASSESSMENT & PLAN:     1. Acne Vulgaris - advised on diagnosis and treatment options. Improved on the spironolactone 150 mg but still getting acne monthly with menses. Is using tretinoin 0.05% cream every night without irritation. Discussed use of topical medications and antibiotics. has the Mirena IUD.  Discussed continuing spironolactone, Accutane, abx and topicals. Patient is amenable to start Accutane after further consideration. Discussed dosing and approximate length of Accutane.     Accutane is discussed fully with the patient. It is a very effective drug to treat acne vulgaris but has many significant side effects. Chief among these are teratogensis, hepatic injury, dyslipidemia and severe drying of the mucous membranes. All of these issues have been discussed in details. Monthly blood tests to monitor lipids and liver functions will be necessary. Expect painful dryness and/or fissuring around the lips, eyes, and other moist areas of the body. Balms may be protective. Contact lens may be too painful to wear temporarily while on this drug. Episodes of significant depression have been reported, including suicidal ideation and attempts in rare cases. It may also cause pseudotumor cerebri and hyperostosis. The patient will report any such changes in mood, depressive symptoms or suicidal thoughts, headaches, joint or bone pains.    Female patients MUST use two simultaneous methods of family planning. Accutane is  Category X for pregnancy, meaning it will cause fetal teratogenic malformations, and pregnancy MUST be avoided while on this drug.    Her two forms are IUD and condoms without fail.     The dose is 0.5-1 mg/kg in two divided doses for 15-20 weeks.    After discussion of these important issues, s/he indicates complete understanding of all of the above, and does wish to proceed with accutane therapy at this time.     --Discontinue tretinoin, spironolactone and any other acne products   --Wash face with gentle cleanser and use lots of moisturizers   --Standing UCG, CMP, CBC, lipid panel today  --Start isotretinoin 30 mg QD with food   --Ipledge number is [id number]  --Goal dose is 9180 mg        Pt advised on use and risks including photosensitivity, allergic reactions, GI upset, headaches, nausea, erythema, scaling, vertigo, asthralgias, blood clots:Yes    Follow-up: 1 month  CC:   Scribed By: Rose Mary Wild, Medical Scribe    The information in this document, created by the medical scribe for me, accurately reflects the services I personally performed and the decisions made by me. I have reviewed and approved this document for accuracy prior to leaving the patient care area.  March 13, 2018 10:27 AM    Geeta Gallagher MS, YANG

## 2018-04-13 NOTE — NURSING NOTE
Chief Complaint   Patient presents with     Accutane       Initial /70  Pulse 77  LMP 04/03/2018  SpO2 99%  Breastfeeding? No There is no height or weight on file to calculate BMI.  Medication Reconciliation: complete

## 2018-04-13 NOTE — PATIENT INSTRUCTIONS
Start 1 tablet of Accutane per day with fatty food     Discontinue spironolactone, tretinoin cream and all other acne products.    Only use gentle cleanser, lots of moisturizer and chapstick.     Follow up in 1 month

## 2018-05-15 ENCOUNTER — OFFICE VISIT (OUTPATIENT)
Dept: DERMATOLOGY | Facility: CLINIC | Age: 28
End: 2018-05-15
Payer: COMMERCIAL

## 2018-05-15 VITALS — DIASTOLIC BLOOD PRESSURE: 61 MMHG | OXYGEN SATURATION: 98 % | SYSTOLIC BLOOD PRESSURE: 99 MMHG | HEART RATE: 70 BPM

## 2018-05-15 DIAGNOSIS — Z51.81 THERAPEUTIC DRUG MONITORING: ICD-10-CM

## 2018-05-15 DIAGNOSIS — L70.0 ACNE VULGARIS: Primary | ICD-10-CM

## 2018-05-15 LAB — BETA HCG QUAL IFA URINE: NEGATIVE

## 2018-05-15 PROCEDURE — 99213 OFFICE O/P EST LOW 20 MIN: CPT | Performed by: PHYSICIAN ASSISTANT

## 2018-05-15 PROCEDURE — 84703 CHORIONIC GONADOTROPIN ASSAY: CPT | Performed by: PHYSICIAN ASSISTANT

## 2018-05-15 NOTE — PROGRESS NOTES
HPI:   Kaci Reyes is a 27 year old female who presents for recheck of acne on isotretinoin   chief complaint  Condition has been present for: many years but has gotten worse over the past 8 months  Pt complains of pain: Yes     Previous treatments include: OTC only  Menstrual cycles are regular: Yes   Condition flares around period: Yes - has Mirena IUD  Areas Involved: face  IPLEDGE #: [id number]  School:   Current Outpatient Prescriptions   Medication Sig Dispense Refill     ISOtretinoin 30 MG CAPS 1 tab pO daily with food 30 capsule 0     levonorgestrel (MIRENA) 20 MCG/24HR IUD 1 each by Intrauterine route       spironolactone (ALDACTONE) 100 MG tablet Take 1 tablet (100 mg) by mouth daily (Patient not taking: Reported on 5/15/2018) 90 tablet 1     spironolactone (ALDACTONE) 50 MG tablet Take 1 tablet (50 mg) by mouth 2 times daily (Patient not taking: Reported on 5/15/2018) 90 tablet 1     tretinoin (RETIN-A) 0.025 % cream Spread a pea size amount into affected area topically at bedtime.  Use sunscreen SPF>20. (Patient not taking: Reported on 4/13/2018) 45 g 11     tretinoin (RETIN-A) 0.05 % cream Spread a pea size amount into affected area topically at bedtime.  Use sunscreen SPF>20. (Patient not taking: Reported on 5/15/2018) 45 g 11     Allergies   Allergen Reactions     Augmentin Hives     Denies any other skin complaints, in general feels well: Yes  Review of symptoms otherwise negative:Yes    This document serves as a record of the services and decisions personally performed and made by Geeta Gallagher, MS, PA-C. It was created on her behalf by Emily Mendoza, a trained medical scribe. The creation of this document is based on the provider's statements to the medical scribe.  Emily Mendoza 5:05 PM May 15, 2018    PHYSICAL EXAM:   A&Ox3: Yes   Well developed/well nourished female Yes   Mood appropriate Yes      BP 99/61  Pulse 70  LMP 05/02/2018  SpO2 98%  Breastfeeding?  No  Type 2 skin. Mood appropriate  Alert and Oriented X 3. Well developed, well nourished in no distress.  General appearance: Normal  Head including face: Normal  Eyes: conjunctiva and lids: Normal  Mouth: Lips, teeth, gums: Normal  Neck: Normal  Back: clear  Cardiovascular: Exam of peripheral vascular system by observation for swelling, varicosities, edema: Normal  Extremities: digits/nails (clubbing): Normal  Right upper extremity: Normal  Left upper extremity: Normal  Right lower extremity: Normal  Left lower extremity: Normal  Skin: Scalp and body hair: See below     Comedones Papules/Pustules Cysts Staining Scarring   Face/Neck 0-1+ 3 0 0-1+ 0   Chest 0 0 0 0 0   Back 0 0 0 0 0     Telangiectasias: No Fixed Erythema: No Exoriations: No   Other Physical Exam Findings:    ASSESSMENT & PLAN:     1. Acne Vulgaris - s/p 1 month of isotretinoin and doing well.      Accutane is discussed fully with the patient. It is a very effective drug to treat acne vulgaris but has many significant side effects. Chief among these are teratogensis, hepatic injury, dyslipidemia and severe drying of the mucous membranes. All of these issues have been discussed in details. Monthly blood tests to monitor lipids and liver functions will be necessary. Expect painful dryness and/or fissuring around the lips, eyes, and other moist areas of the body. Balms may be protective. Contact lens may be too painful to wear temporarily while on this drug. Episodes of significant depression have been reported, including suicidal ideation and attempts in rare cases. It may also cause pseudotumor cerebri and hyperostosis. The patient will report any such changes in mood, depressive symptoms or suicidal thoughts, headaches, joint or bone pains.    Female patients MUST use two simultaneous methods of family planning. Accutane is Category X for pregnancy, meaning it will cause fetal teratogenic malformations, and pregnancy MUST be avoided while on this  drug.    Her two forms are IUD and condoms without fail.     The dose is 0.5-1 mg/kg in two divided doses for 15-20 weeks.    After discussion of these important issues, s/he indicates complete understanding of all of the above, and does wish to proceed with accutane therapy at this time.     --Discontinue tretinoin, spironolactone and any other acne products   --Wash face with gentle cleanser and use lots of moisturizers   --Standing UCG, CMP, CBC, lipid panel today  --Continue isotretinoin 60 mg QD with food month #2  --Total dose: 900 mg  --Ipledge number is [id number]  --Goal dose is 9180 mg        Pt advised on use and risks including photosensitivity, allergic reactions, GI upset, headaches, nausea, erythema, scaling, vertigo, asthralgias, blood clots:Yes    Follow-up: 1 month  CC:   Scribed By: Emily Mendoza, Medical Scribe        Geeta Gallagher, MS, PA-C

## 2018-05-15 NOTE — MR AVS SNAPSHOT
"              After Visit Summary   5/15/2018    Kaci Reyes    MRN: [id number]           Patient Information     Date Of Birth          1990        Visit Information        Provider Department      5/15/2018 5:00 PM Geeta Gallagher PA-C [facility]        Today's Diagnoses     Acne vulgaris    -  1    Therapeutic drug monitoring           Follow-ups after your visit        Your next 10 appointments already scheduled     Jun 12, 2018  4:15 PM CDT   Return Visit with Geeta Gallagher PA-C   [facility] ([facility])    [address] [address] [phone]              Who to contact     If you have questions or need follow up information about today's clinic visit or your schedule please contact [facility] directly at [phone].  Normal or non-critical lab and imaging results will be communicated to you by MyChart, letter or phone within 4 business days after the clinic has received the results. If you do not hear from us within 7 days, please contact the clinic through MyChart or phone. If you have a critical or abnormal lab result, we will notify you by phone as soon as possible.  Submit refill requests through [employer] or call your pharmacy and they will forward the refill request to us. Please allow 3 business days for your refill to be completed.          Additional Information About Your Visit        MyChart Information     [employer] lets you send messages to your doctor, view your test results, renew your prescriptions, schedule appointments and more. To sign up, go to [email or web address] . Click on \"Log in\" on the left side of the screen, which will take you to the Welcome page. Then click on \"Sign up Now\" on the right side of the page.     You will be asked to enter the access code listed below, as well as some personal information. Please follow the directions to create " your username and password.     Your access code is: [email or web address]  Expires: 2018  1:46 PM     Your access code will  in 90 days. If you need help or a new code, please call your [facility] or [phone].        Care EveryWhere ID     This is your Care EveryWhere ID. This could be used by other organizations to access your [facility] medical records  [id number]        Your Vitals Were     Pulse Last Period Pulse Oximetry Breastfeeding?          70 2018 98% No         Blood Pressure from Last 3 Encounters:   05/15/18 99/61   18 116/70   18 110/72    Weight from Last 3 Encounters:   No data found for Wt              Today, you had the following     No orders found for display       Primary Care Provider    None Specified       No primary provider on file.        Equal Access to Services     SAM PAREDES : Hadii rachana English, waaxdigna estevezadaha, qaybta kaalmadigna ram, samuel an . So [facility] [phone].    ATENCIÓN: Si habla español, tiene a conrad disposición servicios gratuitos de asistencia lingüística. Cindy al [phone].    We comply with applicable federal civil rights laws and [place] laws. We do not discriminate on the basis of race, color, national origin, age, disability, sex, sexual orientation, or gender identity.            Thank you!     Thank you for choosing [facility]  for your care. Our goal is always to provide you with excellent care. Hearing back from our patients is one way we can continue to improve our services. Please take a few minutes to complete the written survey that you may receive in the mail after your visit with us. Thank you!             Your Updated Medication List - Protect others around you: Learn how to safely use, store and throw away your medicines at www.disposemymeds.org.          This list is accurate as of 5/15/18  5:24 PM.  Always use your most recent med list.                    Brand Name Dispense Instructions for use Diagnosis    ISOtretinoin 30 MG Caps     30 capsule    1 tab pO daily with food    Acne vulgaris, Therapeutic drug monitoring       levonorgestrel 20 MCG/24HR IUD    MIRENA     1 each by Intrauterine route        * spironolactone 100 MG tablet    ALDACTONE    90 tablet    Take 1 tablet (100 mg) by mouth daily    Acne vulgaris       * spironolactone 50 MG tablet    ALDACTONE    90 tablet    Take 1 tablet (50 mg) by mouth 2 times daily    Acne vulgaris       * tretinoin 0.025 % cream    RETIN-A    45 g    Spread a pea size amount into affected area topically at bedtime.  Use sunscreen SPF>20.    Acne vulgaris       * tretinoin 0.05 % cream    RETIN-A    45 g    Spread a pea size amount into affected area topically at bedtime.  Use sunscreen SPF>20.    Acne vulgaris       * Notice:  This list has 4 medication(s) that are the same as other medications prescribed for you. Read the directions carefully, and ask your doctor or other care provider to review them with you.

## 2018-05-18 ENCOUNTER — TELEPHONE (OUTPATIENT)
Dept: DERMATOLOGY | Facility: CLINIC | Age: 28
End: 2018-05-18

## 2018-05-18 DIAGNOSIS — L70.0 ACNE VULGARIS: Primary | ICD-10-CM

## 2018-05-18 RX ORDER — ISOTRETINOIN 30 MG/1
CAPSULE ORAL
Qty: 60 CAPSULE | Refills: 0 | Status: SHIPPED | OUTPATIENT
Start: 2018-05-18 | End: 2018-06-12

## 2018-05-18 NOTE — TELEPHONE ENCOUNTER
I just sent it - sorry for some reason it did not send after I saw her. Please let her know. Thank you!

## 2018-05-18 NOTE — TELEPHONE ENCOUNTER
Called and notified patient that RX has been called in by provider. Verified pharmacy, patient voiced understanding.  Bianca RN-BSN  [facility]  [phone]

## 2018-05-18 NOTE — TELEPHONE ENCOUNTER
Patient came in for a previous appt and was suppose to have an rx sent over for claravis but that was not done, do not see rx ad an active med on patients chart. Please send rx or call pt with any additional questions.

## 2018-06-12 ENCOUNTER — OFFICE VISIT (OUTPATIENT)
Dept: DERMATOLOGY | Facility: CLINIC | Age: 28
End: 2018-06-12
Payer: COMMERCIAL

## 2018-06-12 VITALS — SYSTOLIC BLOOD PRESSURE: 100 MMHG | HEART RATE: 60 BPM | OXYGEN SATURATION: 99 % | DIASTOLIC BLOOD PRESSURE: 68 MMHG

## 2018-06-12 DIAGNOSIS — L70.0 ACNE VULGARIS: ICD-10-CM

## 2018-06-12 DIAGNOSIS — Z51.81 THERAPEUTIC DRUG MONITORING: ICD-10-CM

## 2018-06-12 LAB
BASOPHILS # BLD AUTO: 0 10E9/L (ref 0–0.2)
BASOPHILS NFR BLD AUTO: 0.6 %
BETA HCG QUAL IFA URINE: NEGATIVE
DIFFERENTIAL METHOD BLD: NORMAL
EOSINOPHIL # BLD AUTO: 0.2 10E9/L (ref 0–0.7)
EOSINOPHIL NFR BLD AUTO: 2.1 %
ERYTHROCYTE [DISTWIDTH] IN BLOOD BY AUTOMATED COUNT: 11.1 % (ref 10–15)
HCT VFR BLD AUTO: 43 % (ref 35–47)
HGB BLD-MCNC: 14.4 G/DL (ref 11.7–15.7)
LYMPHOCYTES # BLD AUTO: 2.6 10E9/L (ref 0.8–5.3)
LYMPHOCYTES NFR BLD AUTO: 35.7 %
MCH RBC QN AUTO: 32.9 PG (ref 26.5–33)
MCHC RBC AUTO-ENTMCNC: 33.5 G/DL (ref 31.5–36.5)
MCV RBC AUTO: 98 FL (ref 78–100)
MONOCYTES # BLD AUTO: 0.7 10E9/L (ref 0–1.3)
MONOCYTES NFR BLD AUTO: 9.2 %
NEUTROPHILS # BLD AUTO: 3.7 10E9/L (ref 1.6–8.3)
NEUTROPHILS NFR BLD AUTO: 52.4 %
PLATELET # BLD AUTO: 180 10E9/L (ref 150–450)
RBC # BLD AUTO: 4.38 10E12/L (ref 3.8–5.2)
WBC # BLD AUTO: 7.1 10E9/L (ref 4–11)

## 2018-06-12 PROCEDURE — 99213 OFFICE O/P EST LOW 20 MIN: CPT | Performed by: PHYSICIAN ASSISTANT

## 2018-06-12 PROCEDURE — 80061 LIPID PANEL: CPT | Performed by: PHYSICIAN ASSISTANT

## 2018-06-12 PROCEDURE — 36415 COLL VENOUS BLD VENIPUNCTURE: CPT | Performed by: PHYSICIAN ASSISTANT

## 2018-06-12 PROCEDURE — 84703 CHORIONIC GONADOTROPIN ASSAY: CPT | Performed by: PHYSICIAN ASSISTANT

## 2018-06-12 PROCEDURE — 80053 COMPREHEN METABOLIC PANEL: CPT | Performed by: PHYSICIAN ASSISTANT

## 2018-06-12 PROCEDURE — 85025 COMPLETE CBC W/AUTO DIFF WBC: CPT | Performed by: PHYSICIAN ASSISTANT

## 2018-06-12 RX ORDER — ISOTRETINOIN 30 MG/1
CAPSULE ORAL
Qty: 60 CAPSULE | Refills: 0 | Status: SHIPPED | OUTPATIENT
Start: 2018-06-12 | End: 2018-07-10

## 2018-06-12 NOTE — PROGRESS NOTES
HPI:   Kaci Reyes is a 27 year old female who presents for recheck of acne on isotretinoin   chief complaint  Condition has been present for: many years but has gotten worse over the past 8 months  Pt complains of pain: Yes     Previous treatments include: OTC only  Menstrual cycles are regular: Yes   Condition flares around period: Yes - has Mirena IUD  Areas Involved: face  IPLEDGE #: [id number]  School:   Current Outpatient Prescriptions   Medication Sig Dispense Refill     ISOtretinoin 30 MG CAPS 30 mg BID with food 60 capsule 0     ISOtretinoin 30 MG CAPS 1 tab pO daily with food 30 capsule 0     levonorgestrel (MIRENA) 20 MCG/24HR IUD 1 each by Intrauterine route       spironolactone (ALDACTONE) 100 MG tablet Take 1 tablet (100 mg) by mouth daily 90 tablet 1     spironolactone (ALDACTONE) 50 MG tablet Take 1 tablet (50 mg) by mouth 2 times daily 90 tablet 1     tretinoin (RETIN-A) 0.025 % cream Spread a pea size amount into affected area topically at bedtime.  Use sunscreen SPF>20. 45 g 11     tretinoin (RETIN-A) 0.05 % cream Spread a pea size amount into affected area topically at bedtime.  Use sunscreen SPF>20. 45 g 11     Allergies   Allergen Reactions     Augmentin Hives     Denies any other skin complaints, in general feels well: Yes  Review of symptoms otherwise negative:Yes      PHYSICAL EXAM:   A&Ox3: Yes   Well developed/well nourished female Yes   Mood appropriate Yes      /68  Pulse 60  LMP 05/28/2018  SpO2 99%  Breastfeeding? No  Type 2 skin. Mood appropriate  Alert and Oriented X 3. Well developed, well nourished in no distress.  General appearance: Normal  Head including face: Normal  Eyes: conjunctiva and lids: Normal  Mouth: Lips, teeth, gums: Normal  Neck: Normal  Back: clear  Cardiovascular: Exam of peripheral vascular system by observation for swelling, varicosities, edema: Normal  Extremities: digits/nails (clubbing): Normal  Right upper extremity: Normal  Left upper extremity:  Normal  Right lower extremity: Normal  Left lower extremity: Normal  Skin: Scalp and body hair: See below     Comedones Papules/Pustules Cysts Staining Scarring   Face/Neck 0-1+ 0 0 0 0   Chest 0 0 0 0 0   Back 0 0 0 0 0     Telangiectasias: No Fixed Erythema: No Exoriations: No   Other Physical Exam Findings:    ASSESSMENT & PLAN:     1. Acne Vulgaris - s/p 2 months of isotretinoin and doing well.  Pleased with results; no side effects other than mild dryness.     Accutane is discussed fully with the patient. It is a very effective drug to treat acne vulgaris but has many significant side effects. Chief among these are teratogensis, hepatic injury, dyslipidemia and severe drying of the mucous membranes. All of these issues have been discussed in details. Monthly blood tests to monitor lipids and liver functions will be necessary. Expect painful dryness and/or fissuring around the lips, eyes, and other moist areas of the body. Balms may be protective. Contact lens may be too painful to wear temporarily while on this drug. Episodes of significant depression have been reported, including suicidal ideation and attempts in rare cases. It may also cause pseudotumor cerebri and hyperostosis. The patient will report any such changes in mood, depressive symptoms or suicidal thoughts, headaches, joint or bone pains.    Female patients MUST use two simultaneous methods of family planning. Accutane is Category X for pregnancy, meaning it will cause fetal teratogenic malformations, and pregnancy MUST be avoided while on this drug.    Her two forms are IUD and condoms without fail.     The dose is 0.5-1 mg/kg in two divided doses for 15-20 weeks.    After discussion of these important issues, s/he indicates complete understanding of all of the above, and does wish to proceed with accutane therapy at this time.     --Discontinue tretinoin, spironolactone and any other acne products   --Wash face with gentle cleanser and use lots  of moisturizers   --Standing UCG, CMP, CBC, lipid panel today  --Continue isotretinoin 60 mg QD with food month #3  --Total dose: 2700 mg  --Ipledge number is [id number]  --Goal dose is 9180 mg        Pt advised on use and risks including photosensitivity, allergic reactions, GI upset, headaches, nausea, erythema, scaling, vertigo, asthralgias, blood clots:Yes    Follow-up: 1 month  CC:   Scribed By: Geeta Gallagher MS, PA-C

## 2018-06-13 LAB
ALBUMIN SERPL-MCNC: 4 G/DL (ref 3.4–5)
ALP SERPL-CCNC: 69 U/L (ref 40–150)
ALT SERPL W P-5'-P-CCNC: 21 U/L (ref 0–50)
ANION GAP SERPL CALCULATED.3IONS-SCNC: 8 MMOL/L (ref 3–14)
AST SERPL W P-5'-P-CCNC: 26 U/L (ref 0–45)
BILIRUB SERPL-MCNC: 0.9 MG/DL (ref 0.2–1.3)
BUN SERPL-MCNC: 12 MG/DL (ref 7–30)
CALCIUM SERPL-MCNC: 9.1 MG/DL (ref 8.5–10.1)
CHLORIDE SERPL-SCNC: 104 MMOL/L (ref 94–109)
CHOLEST SERPL-MCNC: 166 MG/DL
CO2 SERPL-SCNC: 25 MMOL/L (ref 20–32)
CREAT SERPL-MCNC: 0.79 MG/DL (ref 0.52–1.04)
GFR SERPL CREATININE-BSD FRML MDRD: 89 ML/MIN/1.7M2
GLUCOSE SERPL-MCNC: 76 MG/DL (ref 70–99)
HDLC SERPL-MCNC: 56 MG/DL
LDLC SERPL CALC-MCNC: 72 MG/DL
NONHDLC SERPL-MCNC: 110 MG/DL
POTASSIUM SERPL-SCNC: 4.1 MMOL/L (ref 3.4–5.3)
PROT SERPL-MCNC: 7.8 G/DL (ref 6.8–8.8)
SODIUM SERPL-SCNC: 137 MMOL/L (ref 133–144)
TRIGL SERPL-MCNC: 188 MG/DL

## 2018-06-14 ENCOUNTER — TELEPHONE (OUTPATIENT)
Dept: DERMATOLOGY | Facility: CLINIC | Age: 28
End: 2018-06-14

## 2018-06-14 NOTE — TELEPHONE ENCOUNTER
Called and spoke to patient. Educated her on her lab results-all within acceptable limits to continue Accutane. Confirmed future appointment-patient voiced understanding.  Bianca RN-BSN  [facility]  [phone]

## 2018-06-14 NOTE — TELEPHONE ENCOUNTER
Notes Recorded by Geeta Gallagher PA-C on 6/13/2018 at 8:39 PM  CBC, CMP and lipid panel area all within acceptable limits to continue Accutane. Please notify patient.

## 2018-07-10 ENCOUNTER — OFFICE VISIT (OUTPATIENT)
Dept: DERMATOLOGY | Facility: CLINIC | Age: 28
End: 2018-07-10
Payer: COMMERCIAL

## 2018-07-10 ENCOUNTER — TELEPHONE (OUTPATIENT)
Dept: DERMATOLOGY | Facility: CLINIC | Age: 28
End: 2018-07-10

## 2018-07-10 VITALS — SYSTOLIC BLOOD PRESSURE: 102 MMHG | HEART RATE: 75 BPM | DIASTOLIC BLOOD PRESSURE: 71 MMHG | OXYGEN SATURATION: 99 %

## 2018-07-10 DIAGNOSIS — Z51.81 THERAPEUTIC DRUG MONITORING: ICD-10-CM

## 2018-07-10 DIAGNOSIS — L70.0 ACNE VULGARIS: ICD-10-CM

## 2018-07-10 LAB
ALBUMIN SERPL-MCNC: 3.7 G/DL (ref 3.4–5)
ALP SERPL-CCNC: 85 U/L (ref 40–150)
ALT SERPL W P-5'-P-CCNC: 57 U/L (ref 0–50)
ANION GAP SERPL CALCULATED.3IONS-SCNC: 6 MMOL/L (ref 3–14)
AST SERPL W P-5'-P-CCNC: 69 U/L (ref 0–45)
BASOPHILS # BLD AUTO: 0 10E9/L (ref 0–0.2)
BASOPHILS NFR BLD AUTO: 0.6 %
BETA HCG QUAL IFA URINE: NEGATIVE
BILIRUB SERPL-MCNC: 0.8 MG/DL (ref 0.2–1.3)
BUN SERPL-MCNC: 15 MG/DL (ref 7–30)
CALCIUM SERPL-MCNC: 8.8 MG/DL (ref 8.5–10.1)
CHLORIDE SERPL-SCNC: 107 MMOL/L (ref 94–109)
CHOLEST SERPL-MCNC: 199 MG/DL
CO2 SERPL-SCNC: 27 MMOL/L (ref 20–32)
CREAT SERPL-MCNC: 0.88 MG/DL (ref 0.52–1.04)
DIFFERENTIAL METHOD BLD: NORMAL
EOSINOPHIL # BLD AUTO: 0.2 10E9/L (ref 0–0.7)
EOSINOPHIL NFR BLD AUTO: 4.2 %
ERYTHROCYTE [DISTWIDTH] IN BLOOD BY AUTOMATED COUNT: 11 % (ref 10–15)
GFR SERPL CREATININE-BSD FRML MDRD: 78 ML/MIN/1.7M2
GLUCOSE SERPL-MCNC: 83 MG/DL (ref 70–99)
HCT VFR BLD AUTO: 44 % (ref 35–47)
HDLC SERPL-MCNC: 45 MG/DL
HGB BLD-MCNC: 15 G/DL (ref 11.7–15.7)
LDLC SERPL CALC-MCNC: 122 MG/DL
LYMPHOCYTES # BLD AUTO: 1.9 10E9/L (ref 0.8–5.3)
LYMPHOCYTES NFR BLD AUTO: 40.4 %
MCH RBC QN AUTO: 32.9 PG (ref 26.5–33)
MCHC RBC AUTO-ENTMCNC: 34.1 G/DL (ref 31.5–36.5)
MCV RBC AUTO: 97 FL (ref 78–100)
MONOCYTES # BLD AUTO: 0.5 10E9/L (ref 0–1.3)
MONOCYTES NFR BLD AUTO: 9.4 %
NEUTROPHILS # BLD AUTO: 2.2 10E9/L (ref 1.6–8.3)
NEUTROPHILS NFR BLD AUTO: 45.4 %
NONHDLC SERPL-MCNC: 154 MG/DL
PLATELET # BLD AUTO: 192 10E9/L (ref 150–450)
POTASSIUM SERPL-SCNC: 4.1 MMOL/L (ref 3.4–5.3)
PROT SERPL-MCNC: 7.5 G/DL (ref 6.8–8.8)
RBC # BLD AUTO: 4.56 10E12/L (ref 3.8–5.2)
SODIUM SERPL-SCNC: 140 MMOL/L (ref 133–144)
TRIGL SERPL-MCNC: 161 MG/DL
WBC # BLD AUTO: 4.8 10E9/L (ref 4–11)

## 2018-07-10 PROCEDURE — 80053 COMPREHEN METABOLIC PANEL: CPT | Performed by: PHYSICIAN ASSISTANT

## 2018-07-10 PROCEDURE — 84703 CHORIONIC GONADOTROPIN ASSAY: CPT | Performed by: PHYSICIAN ASSISTANT

## 2018-07-10 PROCEDURE — 36415 COLL VENOUS BLD VENIPUNCTURE: CPT | Performed by: PHYSICIAN ASSISTANT

## 2018-07-10 PROCEDURE — 85025 COMPLETE CBC W/AUTO DIFF WBC: CPT | Performed by: PHYSICIAN ASSISTANT

## 2018-07-10 PROCEDURE — 99213 OFFICE O/P EST LOW 20 MIN: CPT | Performed by: PHYSICIAN ASSISTANT

## 2018-07-10 PROCEDURE — 80061 LIPID PANEL: CPT | Performed by: PHYSICIAN ASSISTANT

## 2018-07-10 RX ORDER — ISOTRETINOIN 30 MG/1
CAPSULE ORAL
Qty: 60 CAPSULE | Refills: 0 | Status: SHIPPED | OUTPATIENT
Start: 2018-07-10 | End: 2019-05-07

## 2018-07-10 NOTE — TELEPHONE ENCOUNTER
Called and LM for patient to call back in regards to lab results and providers notes.    Bianca RN-BSN  [facility]  [phone]

## 2018-07-10 NOTE — PROGRESS NOTES
HPI:   Kaci Reyes is a 27 year old female who presents for recheck of acne on isotretinoin   chief complaint  Condition has been present for: many years but has gotten worse over the past 8 months  Pt complains of pain: Yes     Previous treatments include: OTC only  Menstrual cycles are regular: Yes   Condition flares around period: Yes - has Mirena IUD  Areas Involved: face  IPLEDGE #: [id number]  School:   Current Outpatient Prescriptions   Medication Sig Dispense Refill     ISOtretinoin 30 MG CAPS 30 mg BID with food 60 capsule 0     ISOtretinoin 30 MG CAPS 1 tab pO daily with food 30 capsule 0     levonorgestrel (MIRENA) 20 MCG/24HR IUD 1 each by Intrauterine route       spironolactone (ALDACTONE) 100 MG tablet Take 1 tablet (100 mg) by mouth daily 90 tablet 1     spironolactone (ALDACTONE) 50 MG tablet Take 1 tablet (50 mg) by mouth 2 times daily 90 tablet 1     tretinoin (RETIN-A) 0.025 % cream Spread a pea size amount into affected area topically at bedtime.  Use sunscreen SPF>20. 45 g 11     tretinoin (RETIN-A) 0.05 % cream Spread a pea size amount into affected area topically at bedtime.  Use sunscreen SPF>20. 45 g 11     Allergies   Allergen Reactions     Augmentin Hives     Denies any other skin complaints, in general feels well: Yes  Review of symptoms otherwise negative:Yes      PHYSICAL EXAM:   A&Ox3: Yes   Well developed/well nourished female Yes   Mood appropriate Yes      /71  Pulse 75  LMP 06/18/2018  SpO2 99%  Breastfeeding? No  Type 2 skin. Mood appropriate  Alert and Oriented X 3. Well developed, well nourished in no distress.  General appearance: Normal  Head including face: Normal  Eyes: conjunctiva and lids: Normal  Mouth: Lips, teeth, gums: Normal  Neck: Normal  Back: clear  Cardiovascular: Exam of peripheral vascular system by observation for swelling, varicosities, edema: Normal  Extremities: digits/nails (clubbing): Normal  Right upper extremity: Normal  Left upper extremity:  Normal  Right lower extremity: Normal  Left lower extremity: Normal  Skin: Scalp and body hair: See below     Comedones Papules/Pustules Cysts Staining Scarring   Face/Neck 0-1+ 0 0 0 0   Chest 0 0 0 0 0   Back 0 0 0 0 0     Telangiectasias: No Fixed Erythema: No Exoriations: No   Other Physical Exam Findings:    ASSESSMENT & PLAN:     1. Acne Vulgaris - s/p 3 months of isotretinoin and doing well.  Pleased with results; no side effects other than mild dryness.     Accutane is discussed fully with the patient. It is a very effective drug to treat acne vulgaris but has many significant side effects. Chief among these are teratogensis, hepatic injury, dyslipidemia and severe drying of the mucous membranes. All of these issues have been discussed in details. Monthly blood tests to monitor lipids and liver functions will be necessary. Expect painful dryness and/or fissuring around the lips, eyes, and other moist areas of the body. Balms may be protective. Contact lens may be too painful to wear temporarily while on this drug. Episodes of significant depression have been reported, including suicidal ideation and attempts in rare cases. It may also cause pseudotumor cerebri and hyperostosis. The patient will report any such changes in mood, depressive symptoms or suicidal thoughts, headaches, joint or bone pains.    Female patients MUST use two simultaneous methods of family planning. Accutane is Category X for pregnancy, meaning it will cause fetal teratogenic malformations, and pregnancy MUST be avoided while on this drug.    Her two forms are IUD and condoms without fail.     The dose is 0.5-1 mg/kg in two divided doses for 15-20 weeks.    After discussion of these important issues, s/he indicates complete understanding of all of the above, and does wish to proceed with accutane therapy at this time.     --Wash face with gentle cleanser and use lots of moisturizers   --Standing UCG, CMP, CBC, lipid panel  today  --Continue isotretinoin 60 mg QD with food month #4  --Total dose: 4500 mg  --Ipledge number is [id number]  --Goal dose is 9180 mg        Pt advised on use and risks including photosensitivity, allergic reactions, GI upset, headaches, nausea, erythema, scaling, vertigo, asthralgias, blood clots:Yes    Follow-up: 1 month  CC:   Scribed By: Geeta Gallagher, MS, PA-C

## 2018-07-10 NOTE — MR AVS SNAPSHOT
"              After Visit Summary   7/10/2018    Kaci Reyes    MRN: [id number]           Patient Information     Date Of Birth          1990        Visit Information        Provider Department      7/10/2018 9:00 AM Geeta Gallagher PA-C [facility]        Today's Diagnoses     Acne vulgaris        Therapeutic drug monitoring           Follow-ups after your visit        Your next 10 appointments already scheduled     Aug 14, 2018 11:30 AM CDT   Return Visit with Geeta Gallagher PA-C   [facility] ([facility])    [address] [address] [phone]              Who to contact     If you have questions or need follow up information about today's clinic visit or your schedule please contact [facility] directly at [phone].  Normal or non-critical lab and imaging results will be communicated to you by MyChart, letter or phone within 4 business days after the clinic has received the results. If you do not hear from us within 7 days, please contact the clinic through MyChart or phone. If you have a critical or abnormal lab result, we will notify you by phone as soon as possible.  Submit refill requests through Accela or call your pharmacy and they will forward the refill request to us. Please allow 3 business days for your refill to be completed.          Additional Information About Your Visit        MyChart Information     Accela lets you send messages to your doctor, view your test results, renew your prescriptions, schedule appointments and more. To sign up, go to [email or web address] . Click on \"Log in\" on the left side of the screen, which will take you to the Welcome page. Then click on \"Sign up Now\" on the right side of the page.     You will be asked to enter the access code listed below, as well as some personal information. Please follow the directions to create your " username and password.     Your access code is: [email or web address]  Expires: 10/8/2018  9:16 AM     Your access code will  in 90 days. If you need help or a new code, please call your [facility] clinic or [phone].        Care EveryWhere ID     This is your Care EveryWhere ID. This could be used by other organizations to access your [facility] medical records  [id number]        Your Vitals Were     Pulse Last Period Pulse Oximetry Breastfeeding?          75 2018 99% No         Blood Pressure from Last 3 Encounters:   07/10/18 102/71   18 100/68   05/15/18 99/61    Weight from Last 3 Encounters:   No data found for Wt              We Performed the Following     Beta HCG qual IFA urine     CBC with platelets differential     Comprehensive metabolic panel     Lipid panel reflex to direct LDL Non-fasting          Where to get your medicines      These medications were sent to [facility] [id number] [place] [address] [address] AT [facility] [address], [facility] [address]     Phone:  [phone]     ISOtretinoin 30 MG Caps          Primary Care Provider    None Specified       No primary provider on file.        Equal Access to Services     SAM PAREDES AH: Hadii rachana sortoo Soleatha, waaxda luqadaha, qaybta kaalmada adeegyada, samuel ortiz. So [facility] [phone].    ATENCIÓN: Si habla español, tiene a conrad disposición servicios gratuitos de asistencia lingüística. Cindy al [phone].    We comply with applicable federal civil rights laws and [place] laws. We do not discriminate on the basis of race, color, national origin, age, disability, sex, sexual orientation, or gender identity.            Thank you!     Thank you for choosing [facility]  for your care. Our goal is always to provide you with excellent care. Hearing back from our patients is one way we can continue to improve our services. Please take a few  minutes to complete the written survey that you may receive in the mail after your visit with us. Thank you!             Your Updated Medication List - Protect others around you: Learn how to safely use, store and throw away your medicines at www.disposemymeds.org.          This list is accurate as of 7/10/18  9:16 AM.  Always use your most recent med list.                   Brand Name Dispense Instructions for use Diagnosis    * ISOtretinoin 30 MG Caps     30 capsule    1 tab pO daily with food    Acne vulgaris, Therapeutic drug monitoring       * ISOtretinoin 30 MG Caps     60 capsule    30 mg BID with food    Acne vulgaris       levonorgestrel 20 MCG/24HR IUD    MIRENA     1 each by Intrauterine route        * spironolactone 100 MG tablet    ALDACTONE    90 tablet    Take 1 tablet (100 mg) by mouth daily    Acne vulgaris       * spironolactone 50 MG tablet    ALDACTONE    90 tablet    Take 1 tablet (50 mg) by mouth 2 times daily    Acne vulgaris       * tretinoin 0.025 % cream    RETIN-A    45 g    Spread a pea size amount into affected area topically at bedtime.  Use sunscreen SPF>20.    Acne vulgaris       * tretinoin 0.05 % cream    RETIN-A    45 g    Spread a pea size amount into affected area topically at bedtime.  Use sunscreen SPF>20.    Acne vulgaris       * Notice:  This list has 6 medication(s) that are the same as other medications prescribed for you. Read the directions carefully, and ask your doctor or other care provider to review them with you.

## 2018-07-10 NOTE — TELEPHONE ENCOUNTER
Patient called back. Educated patient on lab results- LFT's slightly elevated. Patient voiced that she had consumed Et[place] cabin. Informed patient that we like to make sure labs are monitored closely, and changes are not due to the Accutane itself. Patient voiced understanding.    Bianca RN-BSN  [facility]  [phone]

## 2018-07-10 NOTE — TELEPHONE ENCOUNTER
Notes Recorded by Geeta Gallagher PA-C on 7/10/2018 at 2:04 PM  LFTs are a little high this month. Any recent EtOH or Tylenol?

## 2018-07-19 ENCOUNTER — TELEPHONE (OUTPATIENT)
Dept: DERMATOLOGY | Facility: CLINIC | Age: 28
End: 2018-07-19

## 2018-07-19 NOTE — TELEPHONE ENCOUNTER
Reason for Call:  Other call back    Detailed comments: Pt missed picking up accutane.  She's not sure what to do.  Does she need an appt?    Phone Number Patient can be reached at: Home number on file [phone] (home)    Best Time: anytime    Can we leave a detailed message on this number? YES    Call taken on 7/19/2018 at 10:59 AM by STEVE HAN

## 2018-07-20 DIAGNOSIS — L70.0 ACNE VULGARIS: Primary | ICD-10-CM

## 2018-07-20 DIAGNOSIS — Z51.81 THERAPEUTIC DRUG MONITORING: ICD-10-CM

## 2018-07-20 DIAGNOSIS — L70.0 ACNE VULGARIS: ICD-10-CM

## 2018-07-20 LAB — BETA HCG QUAL IFA URINE: NEGATIVE

## 2018-07-20 PROCEDURE — 84703 CHORIONIC GONADOTROPIN ASSAY: CPT | Performed by: PHYSICIAN ASSISTANT

## 2018-07-20 RX ORDER — ISOTRETINOIN 30 MG/1
CAPSULE ORAL
Qty: 60 CAPSULE | Refills: 0 | Status: SHIPPED | OUTPATIENT
Start: 2018-07-20 | End: 2018-08-14

## 2018-07-20 NOTE — TELEPHONE ENCOUNTER
Called and spoke to patient. Informed patient she will need to go back into any [facility] lab and take another urine pregnancy test. Once results are back provider can re-send Rx. Patient voiced understanding.    ZAKIA Valenzuela-BSN  [facility]  [phone]

## 2018-07-23 ENCOUNTER — TELEPHONE (OUTPATIENT)
Dept: DERMATOLOGY | Facility: CLINIC | Age: 28
End: 2018-07-23

## 2018-07-23 NOTE — TELEPHONE ENCOUNTER
Notes Recorded by Geeta Gallagher PA-C on 7/21/2018 at 9:47 PM  [facility] neg I have sent in a new rx  ------

## 2018-07-23 NOTE — TELEPHONE ENCOUNTER
Called and spoke to patient. Educated patient on lab results-UCG negative. Informed patient that new Rx has been sent to her pharmacy on filed. Patient voiced understanding.    Bianca RN-BSN  [facility]  [phone]

## 2018-08-14 ENCOUNTER — TELEPHONE (OUTPATIENT)
Dept: DERMATOLOGY | Facility: CLINIC | Age: 28
End: 2018-08-14

## 2018-08-14 ENCOUNTER — OFFICE VISIT (OUTPATIENT)
Dept: DERMATOLOGY | Facility: CLINIC | Age: 28
End: 2018-08-14
Payer: COMMERCIAL

## 2018-08-14 VITALS — HEART RATE: 61 BPM | SYSTOLIC BLOOD PRESSURE: 95 MMHG | OXYGEN SATURATION: 100 % | DIASTOLIC BLOOD PRESSURE: 68 MMHG

## 2018-08-14 DIAGNOSIS — L70.0 ACNE VULGARIS: ICD-10-CM

## 2018-08-14 DIAGNOSIS — Z51.81 THERAPEUTIC DRUG MONITORING: ICD-10-CM

## 2018-08-14 LAB
ALBUMIN SERPL-MCNC: 3.9 G/DL (ref 3.4–5)
ALP SERPL-CCNC: 73 U/L (ref 40–150)
ALT SERPL W P-5'-P-CCNC: 20 U/L (ref 0–50)
ANION GAP SERPL CALCULATED.3IONS-SCNC: 6 MMOL/L (ref 3–14)
AST SERPL W P-5'-P-CCNC: 20 U/L (ref 0–45)
BASOPHILS # BLD AUTO: 0 10E9/L (ref 0–0.2)
BASOPHILS NFR BLD AUTO: 0.6 %
BETA HCG QUAL IFA URINE: NEGATIVE
BILIRUB SERPL-MCNC: 0.8 MG/DL (ref 0.2–1.3)
BUN SERPL-MCNC: 9 MG/DL (ref 7–30)
CALCIUM SERPL-MCNC: 8.7 MG/DL (ref 8.5–10.1)
CHLORIDE SERPL-SCNC: 104 MMOL/L (ref 94–109)
CHOLEST SERPL-MCNC: 174 MG/DL
CO2 SERPL-SCNC: 30 MMOL/L (ref 20–32)
CREAT SERPL-MCNC: 0.83 MG/DL (ref 0.52–1.04)
DIFFERENTIAL METHOD BLD: NORMAL
EOSINOPHIL # BLD AUTO: 0.1 10E9/L (ref 0–0.7)
EOSINOPHIL NFR BLD AUTO: 2.2 %
ERYTHROCYTE [DISTWIDTH] IN BLOOD BY AUTOMATED COUNT: 11.3 % (ref 10–15)
GFR SERPL CREATININE-BSD FRML MDRD: 83 ML/MIN/1.7M2
GLUCOSE SERPL-MCNC: 75 MG/DL (ref 70–99)
HCT VFR BLD AUTO: 42.9 % (ref 35–47)
HDLC SERPL-MCNC: 45 MG/DL
HGB BLD-MCNC: 14.7 G/DL (ref 11.7–15.7)
LDLC SERPL CALC-MCNC: 96 MG/DL
LYMPHOCYTES # BLD AUTO: 2 10E9/L (ref 0.8–5.3)
LYMPHOCYTES NFR BLD AUTO: 39.1 %
MCH RBC QN AUTO: 32.9 PG (ref 26.5–33)
MCHC RBC AUTO-ENTMCNC: 34.3 G/DL (ref 31.5–36.5)
MCV RBC AUTO: 96 FL (ref 78–100)
MONOCYTES # BLD AUTO: 0.6 10E9/L (ref 0–1.3)
MONOCYTES NFR BLD AUTO: 11.3 %
NEUTROPHILS # BLD AUTO: 2.4 10E9/L (ref 1.6–8.3)
NEUTROPHILS NFR BLD AUTO: 46.8 %
NONHDLC SERPL-MCNC: 129 MG/DL
PLATELET # BLD AUTO: 210 10E9/L (ref 150–450)
POTASSIUM SERPL-SCNC: 4.2 MMOL/L (ref 3.4–5.3)
PROT SERPL-MCNC: 7.6 G/DL (ref 6.8–8.8)
RBC # BLD AUTO: 4.47 10E12/L (ref 3.8–5.2)
SODIUM SERPL-SCNC: 140 MMOL/L (ref 133–144)
TRIGL SERPL-MCNC: 165 MG/DL
WBC # BLD AUTO: 5 10E9/L (ref 4–11)

## 2018-08-14 PROCEDURE — 80053 COMPREHEN METABOLIC PANEL: CPT | Performed by: PHYSICIAN ASSISTANT

## 2018-08-14 PROCEDURE — 80061 LIPID PANEL: CPT | Performed by: PHYSICIAN ASSISTANT

## 2018-08-14 PROCEDURE — 99213 OFFICE O/P EST LOW 20 MIN: CPT | Performed by: PHYSICIAN ASSISTANT

## 2018-08-14 PROCEDURE — 85025 COMPLETE CBC W/AUTO DIFF WBC: CPT | Performed by: PHYSICIAN ASSISTANT

## 2018-08-14 PROCEDURE — 84703 CHORIONIC GONADOTROPIN ASSAY: CPT | Performed by: PHYSICIAN ASSISTANT

## 2018-08-14 PROCEDURE — 36415 COLL VENOUS BLD VENIPUNCTURE: CPT | Performed by: PHYSICIAN ASSISTANT

## 2018-08-14 RX ORDER — ISOTRETINOIN 30 MG/1
CAPSULE ORAL
Qty: 60 CAPSULE | Refills: 0 | Status: SHIPPED | OUTPATIENT
Start: 2018-08-14 | End: 2018-09-11

## 2018-08-14 NOTE — TELEPHONE ENCOUNTER
Called and spoke to patient. Educated patient on lab results- CBC, CMP, and lipid panel area all WNL to continue Accutane. Patient voiced understanding.    Bianca RN-BSN  [facility]  [phone]

## 2018-08-14 NOTE — MR AVS SNAPSHOT
"              After Visit Summary   2018    Kaci Reyes    MRN: [id number]           Patient Information     Date Of Birth          1990        Visit Information        Provider Department      2018 11:30 AM Geeta Gallagher PA-C [facility]        Today's Diagnoses     Acne vulgaris        Therapeutic drug monitoring           Follow-ups after your visit        Who to contact     If you have questions or need follow up information about today's clinic visit or your schedule please contact [facility] directly at [phone].  Normal or non-critical lab and imaging results will be communicated to you by Allen Tourshart, letter or phone within 4 business days after the clinic has received the results. If you do not hear from us within 7 days, please contact the clinic through Allen Tourshart or phone. If you have a critical or abnormal lab result, we will notify you by phone as soon as possible.  Submit refill requests through Fullscreen or call your pharmacy and they will forward the refill request to us. Please allow 3 business days for your refill to be completed.          Additional Information About Your Visit        MyChart Information     Fullscreen lets you send messages to your doctor, view your test results, renew your prescriptions, schedule appointments and more. To sign up, go to [email or web address] . Click on \"Log in\" on the left side of the screen, which will take you to the Welcome page. Then click on \"Sign up Now\" on the right side of the page.     You will be asked to enter the access code listed below, as well as some personal information. Please follow the directions to create your username and password.     Your access code is: [email or web address]  Expires: 10/8/2018  9:16 AM     Your access code will  in 90 days. If you need help or a new code, please call your [facility] or [phone].        Care EveryWhere ID     This is your Care " EveryWhere ID. This could be used by other organizations to access your [facility] medical records  [id number]        Your Vitals Were     Pulse Last Period Pulse Oximetry Breastfeeding?          61 08/09/2018 100% No         Blood Pressure from Last 3 Encounters:   08/14/18 95/68   07/10/18 102/71   06/12/18 100/68    Weight from Last 3 Encounters:   No data found for Wt              We Performed the Following     Beta HCG qual IFA urine     CBC with platelets differential     Comprehensive metabolic panel     Lipid panel reflex to direct LDL Non-fasting          Where to get your medicines      These medications were sent to [facility] - [place], [place] - [address] AT [facility] [address], [facility] [address]     Phone:  [phone]     ISOtretinoin 30 MG Caps          Primary Care Provider    None Specified       No primary provider on file.        Equal Access to Services     SAM PAREDES : Hadii rachana miller hadasho Soleatha, waaxda luqadaha, qaybta kaalmada adeegyada, samuel an . So [facility] [phone].    ATENCIÓN: Si habla español, tiene a conrad disposición servicios gratuitos de asistencia lingüística. Kimberlyame al [phone].    We comply with applicable federal civil rights laws and [place] laws. We do not discriminate on the basis of race, color, national origin, age, disability, sex, sexual orientation, or gender identity.            Thank you!     Thank you for choosing [facility]  for your care. Our goal is always to provide you with excellent care. Hearing back from our patients is one way we can continue to improve our services. Please take a few minutes to complete the written survey that you may receive in the mail after your visit with us. Thank you!             Your Updated Medication List - Protect others around you: Learn how to safely use, store and throw away your medicines at  www.disposemymeds.org.          This list is accurate as of 8/14/18 11:44 AM.  Always use your most recent med list.                   Brand Name Dispense Instructions for use Diagnosis    * ISOtretinoin 30 MG Caps     30 capsule    1 tab pO daily with food    Acne vulgaris, Therapeutic drug monitoring       * ISOtretinoin 30 MG Caps     60 capsule    30 mg BID with food    Acne vulgaris       * ISOtretinoin 30 MG Caps     60 capsule    1 cap po bid with food    Acne vulgaris       levonorgestrel 20 MCG/24HR IUD    MIRENA     1 each by Intrauterine route        * spironolactone 100 MG tablet    ALDACTONE    90 tablet    Take 1 tablet (100 mg) by mouth daily    Acne vulgaris       * spironolactone 50 MG tablet    ALDACTONE    90 tablet    Take 1 tablet (50 mg) by mouth 2 times daily    Acne vulgaris       * tretinoin 0.025 % cream    RETIN-A    45 g    Spread a pea size amount into affected area topically at bedtime.  Use sunscreen SPF>20.    Acne vulgaris       * tretinoin 0.05 % cream    RETIN-A    45 g    Spread a pea size amount into affected area topically at bedtime.  Use sunscreen SPF>20.    Acne vulgaris       * Notice:  This list has 7 medication(s) that are the same as other medications prescribed for you. Read the directions carefully, and ask your doctor or other care provider to review them with you.

## 2018-08-14 NOTE — TELEPHONE ENCOUNTER
Notes Recorded by Geeta Gallagher PA-C on 8/14/2018 at 12:58 PM  CBC, CMP and lipid panel area all within acceptable limits to continue Accutane. Please notify patient.

## 2018-08-14 NOTE — PROGRESS NOTES
HPI:   Kaci Reyes is a 27 year old female who presents for recheck of acne on isotretinoin   chief complaint  Condition has been present for: many years but has gotten worse over the past 8 months  Pt complains of pain: Yes     Previous treatments include: OTC only  Menstrual cycles are regular: Yes   Condition flares around period: Yes - has Mirena IUD  Areas Involved: face  IPLEDGE #: [id number]  School:   Current Outpatient Prescriptions   Medication Sig Dispense Refill     ISOtretinoin 30 MG CAPS 1 cap po bid with food 60 capsule 0     ISOtretinoin 30 MG CAPS 30 mg BID with food 60 capsule 0     ISOtretinoin 30 MG CAPS 1 tab pO daily with food 30 capsule 0     levonorgestrel (MIRENA) 20 MCG/24HR IUD 1 each by Intrauterine route       spironolactone (ALDACTONE) 100 MG tablet Take 1 tablet (100 mg) by mouth daily 90 tablet 1     spironolactone (ALDACTONE) 50 MG tablet Take 1 tablet (50 mg) by mouth 2 times daily 90 tablet 1     tretinoin (RETIN-A) 0.025 % cream Spread a pea size amount into affected area topically at bedtime.  Use sunscreen SPF>20. 45 g 11     tretinoin (RETIN-A) 0.05 % cream Spread a pea size amount into affected area topically at bedtime.  Use sunscreen SPF>20. 45 g 11     Allergies   Allergen Reactions     Augmentin Hives     Denies any other skin complaints, in general feels well: Yes  Review of symptoms otherwise negative:Yes      PHYSICAL EXAM:   A&Ox3: Yes   Well developed/well nourished female Yes   Mood appropriate Yes      BP 95/68  Pulse 61  LMP 08/09/2018  SpO2 100%  Breastfeeding? No  Type 2 skin. Mood appropriate  Alert and Oriented X 3. Well developed, well nourished in no distress.  General appearance: Normal  Head including face: Normal  Eyes: conjunctiva and lids: Normal  Mouth: Lips, teeth, gums: Normal  Neck: Normal  Back: clear  Cardiovascular: Exam of peripheral vascular system by observation for swelling, varicosities, edema: Normal  Extremities: digits/nails  (clubbing): Normal  Right upper extremity: Normal  Left upper extremity: Normal  Right lower extremity: Normal  Left lower extremity: Normal  Skin: Scalp and body hair: See below     Comedones Papules/Pustules Cysts Staining Scarring   Face/Neck 0-1+ 0 0 0 0   Chest 0 0 0 0 0   Back 0 0 0 0 0     Telangiectasias: No Fixed Erythema: No Exoriations: No   Other Physical Exam Findings:    ASSESSMENT & PLAN:     1. Acne Vulgaris - s/p 4 months of isotretinoin and doing well.  Pleased with results; no side effects other than mild dryness.     Accutane is discussed fully with the patient. It is a very effective drug to treat acne vulgaris but has many significant side effects. Chief among these are teratogensis, hepatic injury, dyslipidemia and severe drying of the mucous membranes. All of these issues have been discussed in details. Monthly blood tests to monitor lipids and liver functions will be necessary. Expect painful dryness and/or fissuring around the lips, eyes, and other moist areas of the body. Balms may be protective. Contact lens may be too painful to wear temporarily while on this drug. Episodes of significant depression have been reported, including suicidal ideation and attempts in rare cases. It may also cause pseudotumor cerebri and hyperostosis. The patient will report any such changes in mood, depressive symptoms or suicidal thoughts, headaches, joint or bone pains.    Female patients MUST use two simultaneous methods of family planning. Accutane is Category X for pregnancy, meaning it will cause fetal teratogenic malformations, and pregnancy MUST be avoided while on this drug.    Her two forms are IUD and condoms without fail.     The dose is 0.5-1 mg/kg in two divided doses for 15-20 weeks.    After discussion of these important issues, s/he indicates complete understanding of all of the above, and does wish to proceed with accutane therapy at this time.     --Wash face with gentle cleanser and use  lots of moisturizers   --Standing UCG, CMP, CBC, lipid panel today  --Continue isotretinoin 60 mg QD with food month #5  --Total dose: 6300 mg  --Ipledge number is [id number]  --Goal dose is 9180 mg        Pt advised on use and risks including photosensitivity, allergic reactions, GI upset, headaches, nausea, erythema, scaling, vertigo, asthralgias, blood clots:Yes    Follow-up: 1 month  CC:   Scribed By: Geeta Gallagher MS, PA-C

## 2018-09-11 ENCOUNTER — TELEPHONE (OUTPATIENT)
Dept: DERMATOLOGY | Facility: CLINIC | Age: 28
End: 2018-09-11

## 2018-09-11 ENCOUNTER — OFFICE VISIT (OUTPATIENT)
Dept: DERMATOLOGY | Facility: CLINIC | Age: 28
End: 2018-09-11
Payer: COMMERCIAL

## 2018-09-11 VITALS — DIASTOLIC BLOOD PRESSURE: 67 MMHG | OXYGEN SATURATION: 100 % | SYSTOLIC BLOOD PRESSURE: 107 MMHG | HEART RATE: 62 BPM

## 2018-09-11 DIAGNOSIS — Z51.81 THERAPEUTIC DRUG MONITORING: Primary | ICD-10-CM

## 2018-09-11 DIAGNOSIS — L70.0 ACNE VULGARIS: ICD-10-CM

## 2018-09-11 DIAGNOSIS — Z51.81 THERAPEUTIC DRUG MONITORING: ICD-10-CM

## 2018-09-11 LAB
ALBUMIN SERPL-MCNC: 3.9 G/DL (ref 3.4–5)
ALP SERPL-CCNC: 64 U/L (ref 40–150)
ALT SERPL W P-5'-P-CCNC: 22 U/L (ref 0–50)
ANION GAP SERPL CALCULATED.3IONS-SCNC: 7 MMOL/L (ref 3–14)
AST SERPL W P-5'-P-CCNC: 26 U/L (ref 0–45)
BASOPHILS # BLD AUTO: 0 10E9/L (ref 0–0.2)
BASOPHILS NFR BLD AUTO: 0.5 %
BETA HCG QUAL IFA URINE: NEGATIVE
BILIRUB SERPL-MCNC: 1 MG/DL (ref 0.2–1.3)
BUN SERPL-MCNC: 7 MG/DL (ref 7–30)
CALCIUM SERPL-MCNC: 8.9 MG/DL (ref 8.5–10.1)
CHLORIDE SERPL-SCNC: 108 MMOL/L (ref 94–109)
CHOLEST SERPL-MCNC: 158 MG/DL
CO2 SERPL-SCNC: 25 MMOL/L (ref 20–32)
CREAT SERPL-MCNC: 0.8 MG/DL (ref 0.52–1.04)
DIFFERENTIAL METHOD BLD: NORMAL
EOSINOPHIL # BLD AUTO: 0.1 10E9/L (ref 0–0.7)
EOSINOPHIL NFR BLD AUTO: 1.7 %
ERYTHROCYTE [DISTWIDTH] IN BLOOD BY AUTOMATED COUNT: 11.6 % (ref 10–15)
GFR SERPL CREATININE-BSD FRML MDRD: 87 ML/MIN/1.7M2
GLUCOSE SERPL-MCNC: 80 MG/DL (ref 70–99)
HCT VFR BLD AUTO: 41.6 % (ref 35–47)
HDLC SERPL-MCNC: 46 MG/DL
HGB BLD-MCNC: 14 G/DL (ref 11.7–15.7)
LDLC SERPL CALC-MCNC: 93 MG/DL
LYMPHOCYTES # BLD AUTO: 1.9 10E9/L (ref 0.8–5.3)
LYMPHOCYTES NFR BLD AUTO: 28.7 %
MCH RBC QN AUTO: 32.4 PG (ref 26.5–33)
MCHC RBC AUTO-ENTMCNC: 33.7 G/DL (ref 31.5–36.5)
MCV RBC AUTO: 96 FL (ref 78–100)
MONOCYTES # BLD AUTO: 0.6 10E9/L (ref 0–1.3)
MONOCYTES NFR BLD AUTO: 9.5 %
NEUTROPHILS # BLD AUTO: 3.9 10E9/L (ref 1.6–8.3)
NEUTROPHILS NFR BLD AUTO: 59.6 %
NONHDLC SERPL-MCNC: 112 MG/DL
PLATELET # BLD AUTO: 199 10E9/L (ref 150–450)
POTASSIUM SERPL-SCNC: 4 MMOL/L (ref 3.4–5.3)
PROT SERPL-MCNC: 7.8 G/DL (ref 6.8–8.8)
RBC # BLD AUTO: 4.32 10E12/L (ref 3.8–5.2)
SODIUM SERPL-SCNC: 140 MMOL/L (ref 133–144)
TRIGL SERPL-MCNC: 93 MG/DL
WBC # BLD AUTO: 6.6 10E9/L (ref 4–11)

## 2018-09-11 PROCEDURE — 80053 COMPREHEN METABOLIC PANEL: CPT | Performed by: PHYSICIAN ASSISTANT

## 2018-09-11 PROCEDURE — 80061 LIPID PANEL: CPT | Performed by: PHYSICIAN ASSISTANT

## 2018-09-11 PROCEDURE — 85025 COMPLETE CBC W/AUTO DIFF WBC: CPT | Performed by: PHYSICIAN ASSISTANT

## 2018-09-11 PROCEDURE — 84703 CHORIONIC GONADOTROPIN ASSAY: CPT | Performed by: PHYSICIAN ASSISTANT

## 2018-09-11 PROCEDURE — 36415 COLL VENOUS BLD VENIPUNCTURE: CPT | Performed by: PHYSICIAN ASSISTANT

## 2018-09-11 PROCEDURE — 99213 OFFICE O/P EST LOW 20 MIN: CPT | Performed by: PHYSICIAN ASSISTANT

## 2018-09-11 RX ORDER — ISOTRETINOIN 30 MG/1
CAPSULE ORAL
Qty: 60 CAPSULE | Refills: 0 | Status: SHIPPED | OUTPATIENT
Start: 2018-09-11 | End: 2019-05-07

## 2018-09-11 NOTE — PROGRESS NOTES
HPI:   Kaci Reyes is a 27 year old female who presents for recheck of acne on isotretinoin   chief complaint  Condition has been present for: many years but has gotten worse over the past 8 months  Pt complains of pain: Yes     Previous treatments include: OTC only  Menstrual cycles are regular: Yes   Condition flares around period: Yes - has Mirena IUD  Areas Involved: face  IPLEDGE #: [id number]  School:   Current Outpatient Prescriptions   Medication Sig Dispense Refill     ISOtretinoin 30 MG CAPS 1 cap po bid with food 60 capsule 0     ISOtretinoin 30 MG CAPS 30 mg BID with food 60 capsule 0     ISOtretinoin 30 MG CAPS 1 tab pO daily with food 30 capsule 0     levonorgestrel (MIRENA) 20 MCG/24HR IUD 1 each by Intrauterine route       spironolactone (ALDACTONE) 100 MG tablet Take 1 tablet (100 mg) by mouth daily 90 tablet 1     spironolactone (ALDACTONE) 50 MG tablet Take 1 tablet (50 mg) by mouth 2 times daily 90 tablet 1     tretinoin (RETIN-A) 0.025 % cream Spread a pea size amount into affected area topically at bedtime.  Use sunscreen SPF>20. 45 g 11     tretinoin (RETIN-A) 0.05 % cream Spread a pea size amount into affected area topically at bedtime.  Use sunscreen SPF>20. 45 g 11     Allergies   Allergen Reactions     Augmentin Hives     Denies any other skin complaints, in general feels well: Yes  Review of symptoms otherwise negative:Yes      PHYSICAL EXAM:   A&Ox3: Yes   Well developed/well nourished female Yes   Mood appropriate Yes      /67  Pulse 62  LMP 09/07/2018  SpO2 100%  Breastfeeding? No  Type 2 skin. Mood appropriate  Alert and Oriented X 3. Well developed, well nourished in no distress.  General appearance: Normal  Head including face: Normal  Eyes: conjunctiva and lids: Normal  Mouth: Lips, teeth, gums: Normal  Neck: Normal  Back: clear  Cardiovascular: Exam of peripheral vascular system by observation for swelling, varicosities, edema: Normal  Extremities: digits/nails  (clubbing): Normal  Right upper extremity: Normal  Left upper extremity: Normal  Right lower extremity: Normal  Left lower extremity: Normal  Skin: Scalp and body hair: See below     Comedones Papules/Pustules Cysts Staining Scarring   Face/Neck 0-1+ 0 0 0 0   Chest 0 0 0 0 0   Back 0 0 0 0 0     Telangiectasias: No Fixed Erythema: No Exoriations: No   Other Physical Exam Findings:    ASSESSMENT & PLAN:     1. Acne Vulgaris - s/p 5 months of isotretinoin and doing well.  Pleased with results; no side effects other than mild dryness.     Accutane is discussed fully with the patient. It is a very effective drug to treat acne vulgaris but has many significant side effects. Chief among these are teratogensis, hepatic injury, dyslipidemia and severe drying of the mucous membranes. All of these issues have been discussed in details. Monthly blood tests to monitor lipids and liver functions will be necessary. Expect painful dryness and/or fissuring around the lips, eyes, and other moist areas of the body. Balms may be protective. Contact lens may be too painful to wear temporarily while on this drug. Episodes of significant depression have been reported, including suicidal ideation and attempts in rare cases. It may also cause pseudotumor cerebri and hyperostosis. The patient will report any such changes in mood, depressive symptoms or suicidal thoughts, headaches, joint or bone pains.    Female patients MUST use two simultaneous methods of family planning. Accutane is Category X for pregnancy, meaning it will cause fetal teratogenic malformations, and pregnancy MUST be avoided while on this drug.    Her two forms are IUD and condoms without fail.     The dose is 0.5-1 mg/kg in two divided doses for 15-20 weeks.    After discussion of these important issues, s/he indicates complete understanding of all of the above, and does wish to proceed with accutane therapy at this time.     --Wash face with gentle cleanser and use  lots of moisturizers   --Standing UCG, CMP, CBC, lipid panel today  --Continue isotretinoin 60 mg QD with food month #6  --Total dose: 8100 mg  --Ipledge number is [id number]  --Goal dose is 9180 mg        Pt advised on use and risks including photosensitivity, allergic reactions, GI upset, headaches, nausea, erythema, scaling, vertigo, asthralgias, blood clots:Yes    Follow-up: 6 months  CC:   Scribed By: Geeta Gallagher MS, PA-C

## 2018-09-11 NOTE — TELEPHONE ENCOUNTER
Notes Recorded by Geeta Gallagher PA-C on 9/11/2018 at 3:23 PM  CBC, CMP and lipid panel area all within acceptable limits to continue Accutane. Please notify patient.

## 2018-09-11 NOTE — TELEPHONE ENCOUNTER
Called and LM for patient to call back in regards to lab results and providers notes.    Bianca RN-BSN  [facility]  [phone]

## 2018-09-11 NOTE — MR AVS SNAPSHOT
"              After Visit Summary   9/11/2018    Kaci Reyes    MRN: [id number]           Patient Information     Date Of Birth          1990        Visit Information        Provider Department      9/11/2018 12:00 PM Geeta Gallagher PA-C [facility]        Today's Diagnoses     Therapeutic drug monitoring    -  1    Acne vulgaris           Follow-ups after your visit        Your next 10 appointments already scheduled     Mar 12, 2019 12:00 PM CDT   Return Visit with Geeta Gallagher PA-C   [facility] ([facility]    [address] [id number] [phone]              Future tests that were ordered for you today     Open Standing Orders        Priority Remaining Interval Expires Ordered    Beta HCG qual IFA urine Routine 7/8 9/11/2019 9/11/2018            Who to contact     If you have questions or need follow up information about today's clinic visit or your schedule please contact [facility] directly at [phone].  Normal or non-critical lab and imaging results will be communicated to you by MyChart, letter or phone within 4 business days after the clinic has received the results. If you do not hear from us within 7 days, please contact the clinic through [employer] or phone. If you have a critical or abnormal lab result, we will notify you by phone as soon as possible.  Submit refill requests through [facility] or call your pharmacy and they will forward the refill request to us. Please allow 3 business days for your refill to be completed.          Additional Information About Your Visit        MyChart Information     [facility] lets you send messages to your doctor, view your test results, renew your prescriptions, schedule appointments and more. To sign up, go to [email or web address] . Click on \"Log in\" on the left side of the screen, which will take you to the Welcome page. Then " "click on \"Sign up Now\" on the right side of the page.     You will be asked to enter the access code listed below, as well as some personal information. Please follow the directions to create your username and password.     Your access code is: [email or web address]  Expires: 10/8/2018  9:16 AM     Your access code will  in 90 days. If you need help or a new code, please call your [place] clinic or [phone].        Care EveryWhere ID     This is your Care EveryWhere ID. This could be used by other organizations to access your [place] medical records  [id number]        Your Vitals Were     Pulse Last Period Pulse Oximetry Breastfeeding?          62 2018 100% No         Blood Pressure from Last 3 Encounters:   18 107/67   18 95/68   07/10/18 102/71    Weight from Last 3 Encounters:   No data found for Wt              We Performed the Following     Beta HCG qual IFA urine          Where to get your medicines      These medications were sent to [facility] [address] [facility] [address] AT [facility]   [address], [facility] [address]     Phone:  [phone]     ISOtretinoin 30 MG Caps          Primary Care Provider    None Specified       No primary provider on file.        Equal Access to Services     SAM PAREDES AH: Hadii rachana ku hadasho Soomaali, waaxda luqadaha, qaybta kaalmada adeegyada, samuel ortiz. So [facility] [phone].    ATENCIÓN: Si habla español, tiene a conrad disposición servicios gratuitos de asistencia lingüística. Cindy al [phone].    We comply with applicable federal civil rights laws and [place] laws. We do not discriminate on the basis of race, color, national origin, age, disability, sex, sexual orientation, or gender identity.            Thank you!     Thank you for choosing [facility]  for your care. Our goal is always to provide you with excellent care. Hearing back from our " patients is one way we can continue to improve our services. Please take a few minutes to complete the written survey that you may receive in the mail after your visit with us. Thank you!             Your Updated Medication List - Protect others around you: Learn how to safely use, store and throw away your medicines at www.disposemymeds.org.          This list is accurate as of 9/11/18 12:41 PM.  Always use your most recent med list.                   Brand Name Dispense Instructions for use Diagnosis    * ISOtretinoin 30 MG Caps     30 capsule    1 tab pO daily with food    Acne vulgaris, Therapeutic drug monitoring       * ISOtretinoin 30 MG Caps     60 capsule    30 mg BID with food    Acne vulgaris       * ISOtretinoin 30 MG Caps     60 capsule    1 cap po bid with food    Acne vulgaris       levonorgestrel 20 MCG/24HR IUD    MIRENA     1 each by Intrauterine route        * spironolactone 100 MG tablet    ALDACTONE    90 tablet    Take 1 tablet (100 mg) by mouth daily    Acne vulgaris       * spironolactone 50 MG tablet    ALDACTONE    90 tablet    Take 1 tablet (50 mg) by mouth 2 times daily    Acne vulgaris       * tretinoin 0.025 % cream    RETIN-A    45 g    Spread a pea size amount into affected area topically at bedtime.  Use sunscreen SPF>20.    Acne vulgaris       * tretinoin 0.05 % cream    RETIN-A    45 g    Spread a pea size amount into affected area topically at bedtime.  Use sunscreen SPF>20.    Acne vulgaris       * Notice:  This list has 7 medication(s) that are the same as other medications prescribed for you. Read the directions carefully, and ask your doctor or other care provider to review them with you.

## 2018-09-11 NOTE — TELEPHONE ENCOUNTER
Called and spoke to patient. Educated patient on lab results- CBC, CMP, and lipid panel all WNL to continue Accutane. Patient voiced understanding.    Bianca RN-BSN  [facility]  [phone]

## 2019-03-12 ENCOUNTER — OFFICE VISIT (OUTPATIENT)
Dept: DERMATOLOGY | Facility: CLINIC | Age: 29
End: 2019-03-12
Payer: COMMERCIAL

## 2019-03-12 VITALS — SYSTOLIC BLOOD PRESSURE: 105 MMHG | HEART RATE: 63 BPM | OXYGEN SATURATION: 100 % | DIASTOLIC BLOOD PRESSURE: 67 MMHG

## 2019-03-12 DIAGNOSIS — L70.0 ACNE VULGARIS: Primary | ICD-10-CM

## 2019-03-12 PROCEDURE — 99213 OFFICE O/P EST LOW 20 MIN: CPT | Performed by: PHYSICIAN ASSISTANT

## 2019-03-12 RX ORDER — TRETINOIN 0.25 MG/G
CREAM TOPICAL
Qty: 45 G | Refills: 11 | Status: SHIPPED | OUTPATIENT
Start: 2019-03-12 | End: 2020-06-02

## 2019-03-12 NOTE — PROGRESS NOTES
HPI:   Kaci Reyes is a 27 year old female who presents for recheck of acne. Finished isotretinoin 6 months ago. Has occasional small pimples but overall doing well and is pleased with the control she has been able to maintain.     Condition has been present for: many years but has gotten worse over the past 8 months  Pt complains of pain: Yes     Previous treatments include: OTC only  Menstrual cycles are regular: Yes   Condition flares around period: Yes - has Mirena IUD  Areas Involved: face  IPLEDGE #: [id number]  School:   Current Outpatient Medications   Medication Sig Dispense Refill     levonorgestrel (MIRENA) 20 MCG/24HR IUD 1 each by Intrauterine route       ISOtretinoin 30 MG CAPS 1 cap po bid with food (Patient not taking: Reported on 3/12/2019) 60 capsule 0     ISOtretinoin 30 MG CAPS 30 mg BID with food (Patient not taking: Reported on 3/12/2019) 60 capsule 0     ISOtretinoin 30 MG CAPS 1 tab pO daily with food (Patient not taking: Reported on 3/12/2019) 30 capsule 0     spironolactone (ALDACTONE) 100 MG tablet Take 1 tablet (100 mg) by mouth daily (Patient not taking: Reported on 3/12/2019) 90 tablet 1     spironolactone (ALDACTONE) 50 MG tablet Take 1 tablet (50 mg) by mouth 2 times daily (Patient not taking: Reported on 3/12/2019) 90 tablet 1     tretinoin (RETIN-A) 0.025 % cream Spread a pea size amount into affected area topically at bedtime.  Use sunscreen SPF>20. (Patient not taking: Reported on 3/12/2019) 45 g 11     tretinoin (RETIN-A) 0.05 % cream Spread a pea size amount into affected area topically at bedtime.  Use sunscreen SPF>20. (Patient not taking: Reported on 3/12/2019) 45 g 11     Allergies   Allergen Reactions     Augmentin Hives     Denies any other skin complaints, in general feels well: Yes  Review of symptoms otherwise negative:Yes    This document serves as a record of the services and decisions personally performed and made by Geeta Gallagher, MS, PA-C. It was created on her  behalf by Emily Mendoza, a trained medical scribe. The creation of this document is based on the provider's statements to the medical scribe.  Emily Mendoza 12:12 PM March 12, 2019    PHYSICAL EXAM:   A&Ox3: Yes   Well developed/well nourished female Yes   Mood appropriate Yes      /67   Pulse 63   SpO2 100%   Breastfeeding? No   Type 2 skin. Mood appropriate  Alert and Oriented X 3. Well developed, well nourished in no distress.  General appearance: Normal  Head including face: Normal  Eyes: conjunctiva and lids: Normal  Mouth: Lips, teeth, gums: Normal  Neck: Normal  Back: clear  Cardiovascular: Exam of peripheral vascular system by observation for swelling, varicosities, edema: Normal  Extremities: digits/nails (clubbing): Normal  Right upper extremity: Normal  Left upper extremity: Normal  Right lower extremity: Normal  Left lower extremity: Normal  Skin: Scalp and body hair: See below     Comedones Papules/Pustules Cysts Staining Scarring   Face/Neck 0-1+ 0 0 0 0   Chest 0 0 0 0 0   Back 0 0 0 0 0     Telangiectasias: No Fixed Erythema: No Exoriations: No   Other Physical Exam Findings:    ASSESSMENT & PLAN:     1. Acne Vulgaris - off of isotretinoin and doing well.  Pleased with results; no side effects other than mild dryness.   --Start tretinoin 0.025% cream daily       Pt advised on use and risks including photosensitivity, allergic reactions, GI upset, headaches, nausea, erythema, scaling, vertigo, asthralgias, blood clots:Yes    Follow-up: yearly if doing well/PRN if struggling  CC:   Scribed By: Emily Mendoza Medical Scribe    The information in this document, created by the medical scribe for me, accurately reflects the services I personally performed and the decisions made by me. I have reviewed and approved this document for accuracy prior to leaving the patient care area.  March 12, 2019 12:15 PM

## 2019-05-07 ENCOUNTER — OFFICE VISIT (OUTPATIENT)
Dept: DERMATOLOGY | Facility: CLINIC | Age: 29
End: 2019-05-07
Payer: COMMERCIAL

## 2019-05-07 VITALS — DIASTOLIC BLOOD PRESSURE: 72 MMHG | HEART RATE: 77 BPM | OXYGEN SATURATION: 96 % | SYSTOLIC BLOOD PRESSURE: 105 MMHG

## 2019-05-07 DIAGNOSIS — L70.0 ACNE VULGARIS: ICD-10-CM

## 2019-05-07 PROCEDURE — 99213 OFFICE O/P EST LOW 20 MIN: CPT | Performed by: PHYSICIAN ASSISTANT

## 2019-05-07 RX ORDER — SULFAMETHOXAZOLE/TRIMETHOPRIM 800-160 MG
TABLET ORAL
Qty: 60 TABLET | Refills: 2 | Status: SHIPPED | OUTPATIENT
Start: 2019-05-07 | End: 2022-08-31

## 2019-05-07 RX ORDER — TRETINOIN 0.5 MG/G
CREAM TOPICAL
Qty: 45 G | Refills: 11 | Status: SHIPPED | OUTPATIENT
Start: 2019-05-07 | End: 2019-10-25

## 2019-05-07 RX ORDER — SPIRONOLACTONE 50 MG/1
50 TABLET, FILM COATED ORAL DAILY
Qty: 90 TABLET | Refills: 1 | Status: SHIPPED | OUTPATIENT
Start: 2019-05-07 | End: 2019-09-10

## 2019-05-07 RX ORDER — SERTRALINE HYDROCHLORIDE 25 MG/1
2 TABLET, FILM COATED ORAL DAILY
COMMUNITY
Start: 2019-03-13 | End: 2022-08-01

## 2019-05-07 RX ORDER — SPIRONOLACTONE 100 MG/1
100 TABLET, FILM COATED ORAL DAILY
Qty: 90 TABLET | Refills: 1 | Status: SHIPPED | OUTPATIENT
Start: 2019-05-07 | End: 2019-09-10

## 2019-05-07 NOTE — PROGRESS NOTES
HPI:   Kaci Reyes is a 28 year old female who presents for recheck of acne. She is flared toady. Finished isotretinoin 9 months ago.    Condition has been present for: many years but has gotten worse over the past 8 months  Pt complains of pain: Yes     Previous treatments include: OTC treatments, isotretinoin, and spironolactone   Menstrual cycles are regular: Yes   Condition flares around period: Yes - has Mirena IUD  Areas Involved: face  IPLEDGE #: [id number]  SHx: getting  in two months   Current Outpatient Medications   Medication Sig Dispense Refill     levonorgestrel (MIRENA) 20 MCG/24HR IUD 1 each by Intrauterine route       sertraline (ZOLOFT) 25 MG tablet Take 2 tablets by mouth daily       tretinoin (RETIN-A) 0.025 % external cream Apply a pea sized to entire face daily 45 g 11     Allergies   Allergen Reactions     Augmentin Hives     Denies any other skin complaints, in general feels well: Yes  Review of symptoms otherwise negative:Yes    This document serves as a record of the services and decisions personally performed and made by Geeta Gallagher, MS, PA-C. It was created on her behalf by Emily Mendoza, a trained medical scribe. The creation of this document is based on the provider's statements to the medical scribe.  Emily Mendoza 3:05 PM May 7, 2019    PHYSICAL EXAM:   A&Ox3: Yes   Well developed/well nourished female Yes   Mood appropriate Yes      /72   Pulse 77   SpO2 96%   Breastfeeding? No   Type 2 skin. Mood appropriate  Alert and Oriented X 3. Well developed, well nourished in no distress.  General appearance: Normal  Head including face: Normal  Eyes: conjunctiva and lids: Normal  Mouth: Lips, teeth, gums: Normal  Neck: Normal  Back: clear  Cardiovascular: Exam of peripheral vascular system by observation for swelling, varicosities, edema: Normal  Extremities: digits/nails (clubbing): Normal  Right upper extremity: Normal  Left upper extremity:  Normal  Right lower extremity: Normal  Left lower extremity: Normal  Skin: Scalp and body hair: See below     Comedones Papules/Pustules Cysts Staining Scarring   Face/Neck 0-1+ 1+ lower cheeks 0 0 0   Chest 0 0 0 0 0   Back 0 0 0 0 0     Telangiectasias: No Fixed Erythema: No Exoriations: No   Other Physical Exam Findings:    ASSESSMENT & PLAN:     1. Acne Vulgaris - acne is flared today. Finished isotretinoin 9 months ago. Discussed restarting spironolactone vs another course of isotretinoin. No adverse effects on spironolactone or bactrim.   --Start spironolactone 150 mg daily. Advised on potential for hypotension, teratogenetic effects, menstrual irregularities, hyperkalemia and need for Q 6 month K+ checks.   --Start bactrim x 3 months  --Start tretinoin 0.05% cream daily       Pt advised on use and risks including photosensitivity, allergic reactions, GI upset, headaches, nausea, erythema, scaling, vertigo, asthralgias, blood clots:Yes    Follow-up: yearly if doing well/PRN if struggling  CC:   Scribed By: Emily Mendoza, Medical Scribe    The information in this document, created by the medical scribe for me, accurately reflects the services I personally performed and the decisions made by me. I have reviewed and approved this document for accuracy prior to leaving the patient care area.  May 7, 2019 3:13 PM    Geeta Gallagher MS, PA-C

## 2019-05-07 NOTE — LETTER
5/7/2019         RE: Kaci Reyes  [address] [place] [facility] [address]        Dear Colleague,    Thank you for referring your patient, Kaci Reyes, to the [facility]. Please see a copy of my visit note below.    HPI:   Kaci Reyes is a 28 year old female who presents for recheck of acne. She is flared toady. Finished isotretinoin 9 months ago.    Condition has been present for: many years but has gotten worse over the past 8 months  Pt complains of pain: Yes     Previous treatments include: OTC treatments, isotretinoin, and spironolactone   Menstrual cycles are regular: Yes   Condition flares around period: Yes - has Mirena IUD  Areas Involved: face  IPLEDGE #: [id number]  SHx: getting  in two months   Current Outpatient Medications   Medication Sig Dispense Refill     levonorgestrel (MIRENA) 20 MCG/24HR IUD 1 each by Intrauterine route       sertraline (ZOLOFT) 25 MG tablet Take 2 tablets by mouth daily       tretinoin (RETIN-A) 0.025 % external cream Apply a pea sized to entire face daily 45 g 11     Allergies   Allergen Reactions     Augmentin Hives     Denies any other skin complaints, in general feels well: Yes  Review of symptoms otherwise negative:Yes    This document serves as a record of the services and decisions personally performed and made by Geeta Gallagher, MS, PA-C. It was created on her behalf by Emily Mendoza, a trained medical scribe. The creation of this document is based on the provider's statements to the medical scribe.  Emily Mendoza 3:05 PM May 7, 2019    PHYSICAL EXAM:   A&Ox3: Yes   Well developed/well nourished female Yes   Mood appropriate Yes      /72   Pulse 77   SpO2 96%   Breastfeeding? No   Type 2 skin. Mood appropriate  Alert and Oriented X 3. Well developed, well nourished in no distress.  General appearance: Normal  Head including face: Normal  Eyes: conjunctiva and lids: Normal  Mouth: Lips, teeth, gums:  Normal  Neck: Normal  Back: clear  Cardiovascular: Exam of peripheral vascular system by observation for swelling, varicosities, edema: Normal  Extremities: digits/nails (clubbing): Normal  Right upper extremity: Normal  Left upper extremity: Normal  Right lower extremity: Normal  Left lower extremity: Normal  Skin: Scalp and body hair: See below     Comedones Papules/Pustules Cysts Staining Scarring   Face/Neck 0-1+ 1+ lower cheeks 0 0 0   Chest 0 0 0 0 0   Back 0 0 0 0 0     Telangiectasias: No Fixed Erythema: No Exoriations: No   Other Physical Exam Findings:    ASSESSMENT & PLAN:     1. Acne Vulgaris - acne is flared today. Finished isotretinoin 9 months ago. Discussed restarting spironolactone vs another course of isotretinoin. No adverse effects on spironolactone or bactrim.   --Start spironolactone 150 mg daily. Advised on potential for hypotension, teratogenetic effects, menstrual irregularities, hyperkalemia and need for Q 6 month K+ checks.   --Start bactrim x 3 months  --Start tretinoin 0.05% cream daily       Pt advised on use and risks including photosensitivity, allergic reactions, GI upset, headaches, nausea, erythema, scaling, vertigo, asthralgias, blood clots:Yes    Follow-up: yearly if doing well/PRN if struggling  CC:   Scribed By: Emily Mendoza, Medical Scribe    The information in this document, created by the medical scribe for me, accurately reflects the services I personally performed and the decisions made by me. I have reviewed and approved this document for accuracy prior to leaving the patient care area.  May 7, 2019 3:13 PM    Geeta Gallagher MS, PATIA      Again, thank you for allowing me to participate in the care of your patient.        Sincerely,        Geeta Gallagher PA-C

## 2019-05-16 ENCOUNTER — HEALTH MAINTENANCE LETTER (OUTPATIENT)
Age: 29
End: 2019-05-16

## 2019-09-10 ENCOUNTER — OFFICE VISIT (OUTPATIENT)
Dept: DERMATOLOGY | Facility: CLINIC | Age: 29
End: 2019-09-10
Payer: COMMERCIAL

## 2019-09-10 VITALS — SYSTOLIC BLOOD PRESSURE: 111 MMHG | OXYGEN SATURATION: 98 % | DIASTOLIC BLOOD PRESSURE: 76 MMHG | HEART RATE: 68 BPM

## 2019-09-10 DIAGNOSIS — L50.9 URTICARIA: ICD-10-CM

## 2019-09-10 DIAGNOSIS — L70.0 ACNE VULGARIS: Primary | ICD-10-CM

## 2019-09-10 PROCEDURE — 99213 OFFICE O/P EST LOW 20 MIN: CPT | Performed by: PHYSICIAN ASSISTANT

## 2019-09-10 RX ORDER — SPIRONOLACTONE 100 MG/1
100 TABLET, FILM COATED ORAL DAILY
Qty: 90 TABLET | Refills: 3 | Status: SHIPPED | OUTPATIENT
Start: 2019-09-10 | End: 2020-02-11

## 2019-09-10 RX ORDER — SPIRONOLACTONE 50 MG/1
50 TABLET, FILM COATED ORAL DAILY
Qty: 90 TABLET | Refills: 3 | Status: SHIPPED | OUTPATIENT
Start: 2019-09-10 | End: 2020-02-11

## 2019-09-10 NOTE — PATIENT INSTRUCTIONS
For the hives on your arms take two Claritin in the morning and two Zyrtec at night for 1 month.     Follow up in 4-6 weeks

## 2019-09-10 NOTE — PROGRESS NOTES
HPI:   Kaci Reyes is a 28 year old female who presents for recheck of acne. Finished isotretinoin 1 year ago. Has been on 150 mg of spironolactone and has been working well.   Condition has been present for: many years   Pt complains of pain: Yes     Previous treatments include: OTC treatments, isotretinoin, and spironolactone   Menstrual cycles are regular: Yes   Condition flares around period: Yes - has Mirena IUD  Areas Involved: face  IPLEDGE #: [id number]  SHx: She got  in May - the day was great.     Additional concern: rash on upper bilateral arms. Rash is very itchy and bumpy. She has tried OTC antihistamines and TAC.      Current Outpatient Medications   Medication Sig Dispense Refill     tretinoin (RETIN-A) 0.05 % external cream Apply a pea sized amount to entire face daily 45 g 11     levonorgestrel (MIRENA) 20 MCG/24HR IUD 1 each by Intrauterine route       sertraline (ZOLOFT) 25 MG tablet Take 2 tablets by mouth daily       spironolactone (ALDACTONE) 100 MG tablet Take 1 tablet (100 mg) by mouth daily (Patient not taking: Reported on 9/10/2019) 90 tablet 1     spironolactone (ALDACTONE) 50 MG tablet Take 1 tablet (50 mg) by mouth daily (Patient not taking: Reported on 9/10/2019) 90 tablet 1     sulfamethoxazole-trimethoprim (BACTRIM DS) 800-160 MG tablet 1 tab PO BID (Patient not taking: Reported on 9/10/2019) 60 tablet 2     tretinoin (RETIN-A) 0.025 % external cream Apply a pea sized to entire face daily (Patient not taking: Reported on 9/10/2019) 45 g 11     triamcinolone (KENALOG) 0.1 % external cream Apply twice daily to affected area on arms as needed for two weeks. (Patient not taking: Reported on 9/10/2019) 60 g 1     Allergies   Allergen Reactions     Augmentin Hives     Denies any other skin complaints, in general feels well: Yes  Review of symptoms otherwise negative:Yes    This document serves as a record of the services and decisions personally performed and made by Geeta Gallagher,  MS, YANG. It was created on her behalf by Emily Mendoza, a trained medical scribe. The creation of this document is based on the provider's statements to the medical scribe.  Emily Mendoza 9:16 AM September 10, 2019    PHYSICAL EXAM:   A&Ox3: Yes   Well developed/well nourished female Yes   Mood appropriate Yes      /76   Pulse 68   LMP 09/05/2019   SpO2 98%   Breastfeeding? No   Type 2 skin. Mood appropriate  Alert and Oriented X 3. Well developed, well nourished in no distress.  General appearance: Normal  Head including face: Normal  Eyes: conjunctiva and lids: Normal  Mouth: Lips, teeth, gums: Normal  Neck: Normal  Back: clear  Cardiovascular: Exam of peripheral vascular system by observation for swelling, varicosities, edema: Normal  Extremities: digits/nails (clubbing): Normal  Right upper extremity: Normal  Left upper extremity: Normal  Right lower extremity: Normal  Left lower extremity: Normal  Skin: Scalp and body hair: See below     Comedones Papules/Pustules Cysts Staining Scarring   Face/Neck 0-1+ 0 0 0 0   Chest 0 0 0 0 0   Back 0 0 0 0 0     Telangiectasias: No Fixed Erythema: No Exoriations: No   Other Physical Exam Findings:    ASSESSMENT & PLAN:     1. Acne Vulgaris -. She is doing well on spironolactone and would like to continue this.  No adverse effects on spironolactone.   --Continue spironolactone 150 mg daily. Advised on potential for hypotension, teratogenetic effects, menstrual irregularities, hyperkalemia and need for Q 6 month K+ checks.   --Continue tretinoin 0.05% cream daily   2. Likely Urticaria - has been getting welts on upper arms on and off for several months. Not present today.   --Start 2 tabs Claritin in AM and 2 tabs Zyrtec in PM until follow up.       Pt advised on use and risks including photosensitivity, allergic reactions, GI upset, headaches, nausea, erythema, scaling, vertigo, asthralgias, blood clots:Yes    Follow-up: 4-6 weeks for  urticaria if not improved/yearly if doing well for acne  CC:   Scribed By: Emily Mendoza, Medical Scribe    The information in this document, created by the medical scribe for me, accurately reflects the services I personally performed and the decisions made by me. I have reviewed and approved this document for accuracy prior to leaving the patient care area.  September 10, 2019 9:25 AM    Geeta Gallagher MS, PA-C

## 2019-09-10 NOTE — LETTER
9/10/2019         RE: Kaci Reyes  [address] [facility] [address]        Dear Colleague,    Thank you for referring your patient, Kaci Reyes, to the [facility]. Please see a copy of my visit note below.    HPI:   Kaci Reyes is a 28 year old female who presents for recheck of acne. Finished isotretinoin 1 year ago. Has been on 150 mg of spironolactone and has been working well.   Condition has been present for: many years   Pt complains of pain: Yes     Previous treatments include: OTC treatments, isotretinoin, and spironolactone   Menstrual cycles are regular: Yes   Condition flares around period: Yes - has Mirena IUD  Areas Involved: face  IPLEDGE #: [id number]  SHx: She got  in May - the day was great.     Additional concern: rash on upper bilateral arms. Rash is very itchy and bumpy. She has tried OTC antihistamines and TAC.      Current Outpatient Medications   Medication Sig Dispense Refill     tretinoin (RETIN-A) 0.05 % external cream Apply a pea sized amount to entire face daily 45 g 11     levonorgestrel (MIRENA) 20 MCG/24HR IUD 1 each by Intrauterine route       sertraline (ZOLOFT) 25 MG tablet Take 2 tablets by mouth daily       spironolactone (ALDACTONE) 100 MG tablet Take 1 tablet (100 mg) by mouth daily (Patient not taking: Reported on 9/10/2019) 90 tablet 1     spironolactone (ALDACTONE) 50 MG tablet Take 1 tablet (50 mg) by mouth daily (Patient not taking: Reported on 9/10/2019) 90 tablet 1     sulfamethoxazole-trimethoprim (BACTRIM DS) 800-160 MG tablet 1 tab PO BID (Patient not taking: Reported on 9/10/2019) 60 tablet 2     tretinoin (RETIN-A) 0.025 % external cream Apply a pea sized to entire face daily (Patient not taking: Reported on 9/10/2019) 45 g 11     triamcinolone (KENALOG) 0.1 % external cream Apply twice daily to affected area on arms as needed for two weeks. (Patient not taking: Reported on 9/10/2019) 60 g 1     Allergies   Allergen  Reactions     Augmentin Hives     Denies any other skin complaints, in general feels well: Yes  Review of symptoms otherwise negative:Yes    This document serves as a record of the services and decisions personally performed and made by Geeta Gallagher, MS, PA-C. It was created on her behalf by Emily Mendoza, a trained medical scribe. The creation of this document is based on the provider's statements to the medical scribe.  Emily Mendoza 9:16 AM September 10, 2019    PHYSICAL EXAM:   A&Ox3: Yes   Well developed/well nourished female Yes   Mood appropriate Yes      /76   Pulse 68   LMP 09/05/2019   SpO2 98%   Breastfeeding? No   Type 2 skin. Mood appropriate  Alert and Oriented X 3. Well developed, well nourished in no distress.  General appearance: Normal  Head including face: Normal  Eyes: conjunctiva and lids: Normal  Mouth: Lips, teeth, gums: Normal  Neck: Normal  Back: clear  Cardiovascular: Exam of peripheral vascular system by observation for swelling, varicosities, edema: Normal  Extremities: digits/nails (clubbing): Normal  Right upper extremity: Normal  Left upper extremity: Normal  Right lower extremity: Normal  Left lower extremity: Normal  Skin: Scalp and body hair: See below     Comedones Papules/Pustules Cysts Staining Scarring   Face/Neck 0-1+ 0 0 0 0   Chest 0 0 0 0 0   Back 0 0 0 0 0     Telangiectasias: No Fixed Erythema: No Exoriations: No   Other Physical Exam Findings:    ASSESSMENT & PLAN:     1. Acne Vulgaris -. She is doing well on spironolactone and would like to continue this.  No adverse effects on spironolactone.   --Continue spironolactone 150 mg daily. Advised on potential for hypotension, teratogenetic effects, menstrual irregularities, hyperkalemia and need for Q 6 month K+ checks.   --Continue tretinoin 0.05% cream daily   2. Likely Urticaria - has been getting welts on upper arms on and off for several months. Not present today.   --Start 2 tabs  Claritin in AM and 2 tabs Zyrtec in PM until follow up.       Pt advised on use and risks including photosensitivity, allergic reactions, GI upset, headaches, nausea, erythema, scaling, vertigo, asthralgias, blood clots:Yes    Follow-up: 4-6 weeks for urticaria if not improved/yearly if doing well for acne  CC:   Scribed By: Emily Mendoza, Medical Scribe    The information in this document, created by the medical scribe for me, accurately reflects the services I personally performed and the decisions made by me. I have reviewed and approved this document for accuracy prior to leaving the patient care area.  September 10, 2019 9:25 AM    Geeta Gallagher MS, PATIA      Again, thank you for allowing me to participate in the care of your patient.        Sincerely,        Geeta Gallagher PA-C

## 2019-10-24 ENCOUNTER — MYC REFILL (OUTPATIENT)
Dept: DERMATOLOGY | Facility: CLINIC | Age: 29
End: 2019-10-24

## 2019-10-24 DIAGNOSIS — L70.0 ACNE VULGARIS: ICD-10-CM

## 2019-10-24 RX ORDER — TRETINOIN 0.5 MG/G
CREAM TOPICAL
Qty: 45 G | Refills: 11 | Status: CANCELLED | OUTPATIENT
Start: 2019-10-24

## 2019-10-24 NOTE — TELEPHONE ENCOUNTER
Patient requesting refill on tretinoin (RETIN-A) 0.025 % external cream.   Patient advised to call [facility] at [phone] to request a refill as she had 11 refills on sent on 3/12/19.    Jocelyn Brand RN

## 2019-10-25 ENCOUNTER — MYC REFILL (OUTPATIENT)
Dept: DERMATOLOGY | Facility: CLINIC | Age: 29
End: 2019-10-25

## 2019-10-25 DIAGNOSIS — L70.0 ACNE VULGARIS: ICD-10-CM

## 2019-10-25 RX ORDER — TRETINOIN 0.5 MG/G
CREAM TOPICAL
Qty: 45 G | Refills: 11 | Status: SHIPPED | OUTPATIENT
Start: 2019-10-25 | End: 2020-03-03

## 2020-02-10 DIAGNOSIS — L70.0 ACNE VULGARIS: ICD-10-CM

## 2020-02-11 ENCOUNTER — MYC MEDICAL ADVICE (OUTPATIENT)
Dept: DERMATOLOGY | Facility: CLINIC | Age: 30
End: 2020-02-11

## 2020-02-11 DIAGNOSIS — L70.0 ACNE VULGARIS: ICD-10-CM

## 2020-02-11 RX ORDER — SPIRONOLACTONE 50 MG/1
50 TABLET, FILM COATED ORAL DAILY
Qty: 90 TABLET | Refills: 3 | Status: SHIPPED | OUTPATIENT
Start: 2020-02-11 | End: 2020-03-03

## 2020-02-11 RX ORDER — SPIRONOLACTONE 100 MG/1
100 TABLET, FILM COATED ORAL DAILY
Qty: 90 TABLET | Refills: 3 | Status: SHIPPED | OUTPATIENT
Start: 2020-02-11 | End: 2020-03-03

## 2020-02-11 NOTE — TELEPHONE ENCOUNTER
"[facility] protocol failed.    Bianca RN-BSN-PHN  [facility]  [phone]  _________________________________________________    Last Written Prescription Date:  9/10/19  Last Fill Quantity: 90 tablets,  # refills: 3   Last office visit: 9/10/2019 with prescribing provider:  Geeta Gallagher PA-C   Future Office Visit: No    Requested Prescriptions   Pending Prescriptions Disp Refills     spironolactone (ALDACTONE) 50 MG tablet 90 tablet 3     Sig: Take 1 tablet (50 mg) by mouth daily       Diuretics (Including Combos) Protocol Failed - 2/10/2020  6:16 PM        Failed - Normal serum creatinine on file in past 12 months     Recent Labs   Lab Test 09/11/18  1146   CR 0.80              Failed - Normal serum potassium on file in past 12 months     Recent Labs   Lab Test 09/11/18  1146   POTASSIUM 4.0                    Failed - Normal serum sodium on file in past 12 months     Recent Labs   Lab Test 09/11/18  1146                 Passed - Blood pressure under 140/90 in past 12 months     BP Readings from Last 3 Encounters:   09/10/19 111/76   05/07/19 105/72   03/12/19 105/67                 Passed - Recent (12 mo) or future (30 days) visit within the authorizing provider's specialty     Patient has had an office visit with the authorizing provider or a provider within the authorizing providers department within the previous 12 mos or has a future within next 30 days. See \"Patient Info\" tab in inbasket, or \"Choose Columns\" in Meds & Orders section of the refill encounter.              Passed - Medication is active on med list        Passed - Patient is age 18 or older        Passed - No active pregancy on record        Passed - No positive pregnancy test in past 12 months        "

## 2020-02-11 NOTE — TELEPHONE ENCOUNTER
[employer] message sent:    Ronan Clemons-    We received a refill request for spironolactone.     The script was only for 50mg, but we show you are supposed to be taking 150mg.    Do you want only 50mg?    Thanks,     Bianca RN-BSN-N  [facility]  [phone]

## 2020-03-02 ENCOUNTER — HEALTH MAINTENANCE LETTER (OUTPATIENT)
Age: 30
End: 2020-03-02

## 2020-03-03 ENCOUNTER — MYC REFILL (OUTPATIENT)
Dept: DERMATOLOGY | Facility: CLINIC | Age: 30
End: 2020-03-03

## 2020-03-03 DIAGNOSIS — L70.0 ACNE VULGARIS: ICD-10-CM

## 2020-03-04 RX ORDER — SPIRONOLACTONE 100 MG/1
100 TABLET, FILM COATED ORAL DAILY
Qty: 90 TABLET | Refills: 3 | Status: SHIPPED | OUTPATIENT
Start: 2020-03-04 | End: 2020-03-19

## 2020-03-04 RX ORDER — SPIRONOLACTONE 50 MG/1
50 TABLET, FILM COATED ORAL DAILY
Qty: 90 TABLET | Refills: 3 | Status: SHIPPED | OUTPATIENT
Start: 2020-03-04 | End: 2020-03-19

## 2020-03-04 RX ORDER — TRETINOIN 0.5 MG/G
CREAM TOPICAL
Qty: 45 G | Refills: 11 | Status: SHIPPED | OUTPATIENT
Start: 2020-03-04 | End: 2020-12-01

## 2020-03-09 ENCOUNTER — MYC MEDICAL ADVICE (OUTPATIENT)
Dept: DERMATOLOGY | Facility: CLINIC | Age: 30
End: 2020-03-09

## 2020-03-10 NOTE — TELEPHONE ENCOUNTER
Called and spoke to patient.    Informed patient we sent Spironolactone on 3/4/20 to [facility].    E-Prescribing Status: Receipt confirmed by pharmacy (3/4/2020  5:03 PM CST)     Patient received an email on 3/4 from [facility]- was unsure what the email was regarding. Advised patient call [facility] to check on status, and call back if there is anything we need to do on our end.    Patient voiced understanding.    Bianca RN-BSN-PHN  [facility]  [phone]

## 2020-03-16 ENCOUNTER — MYC MEDICAL ADVICE (OUTPATIENT)
Dept: DERMATOLOGY | Facility: CLINIC | Age: 30
End: 2020-03-16

## 2020-03-16 DIAGNOSIS — L70.0 ACNE VULGARIS: ICD-10-CM

## 2020-03-19 RX ORDER — SPIRONOLACTONE 50 MG/1
50 TABLET, FILM COATED ORAL DAILY
Qty: 90 TABLET | Refills: 3 | Status: SHIPPED | OUTPATIENT
Start: 2020-03-19 | End: 2020-11-02

## 2020-03-19 RX ORDER — SPIRONOLACTONE 100 MG/1
100 TABLET, FILM COATED ORAL DAILY
Qty: 90 TABLET | Refills: 3 | Status: SHIPPED | OUTPATIENT
Start: 2020-03-19 | End: 2020-11-20

## 2020-03-19 NOTE — TELEPHONE ENCOUNTER
"Requested Prescriptions   Pending Prescriptions Disp Refills     spironolactone (ALDACTONE) 100 MG tablet 90 tablet 3     Sig: Take 1 tablet (100 mg) by mouth daily       Diuretics (Including Combos) Protocol Failed - 3/19/2020  8:08 AM        Failed - Normal serum creatinine on file in past 12 months     Recent Labs   Lab Test 09/11/18  1146   CR 0.80              Failed - Normal serum potassium on file in past 12 months     Recent Labs   Lab Test 09/11/18  1146   POTASSIUM 4.0                    Failed - Normal serum sodium on file in past 12 months     Recent Labs   Lab Test 09/11/18  1146                 Passed - Blood pressure under 140/90 in past 12 months     BP Readings from Last 3 Encounters:   09/10/19 111/76   05/07/19 105/72   03/12/19 105/67                 Passed - Recent (12 mo) or future (30 days) visit within the authorizing provider's specialty     Patient has had an office visit with the authorizing provider or a provider within the authorizing providers department within the previous 12 mos or has a future within next 30 days. See \"Patient Info\" tab in inbasket, or \"Choose Columns\" in Meds & Orders section of the refill encounter.              Passed - Medication is active on med list        Passed - Patient is age 18 or older        Passed - No active pregancy on record        Passed - No positive pregnancy test in past 12 months           spironolactone (ALDACTONE) 50 MG tablet 90 tablet 3     Sig: Take 1 tablet (50 mg) by mouth daily       Diuretics (Including Combos) Protocol Failed - 3/19/2020  8:08 AM        Failed - Normal serum creatinine on file in past 12 months     Recent Labs   Lab Test 09/11/18  1146   CR 0.80              Failed - Normal serum potassium on file in past 12 months     Recent Labs   Lab Test 09/11/18  1146   POTASSIUM 4.0                    Failed - Normal serum sodium on file in past 12 months     Recent Labs   Lab Test 09/11/18  1146                 " "Passed - Blood pressure under 140/90 in past 12 months     BP Readings from Last 3 Encounters:   09/10/19 111/76   05/07/19 105/72   03/12/19 105/67                 Passed - Recent (12 mo) or future (30 days) visit within the authorizing provider's specialty     Patient has had an office visit with the authorizing provider or a provider within the authorizing providers department within the previous 12 mos or has a future within next 30 days. See \"Patient Info\" tab in inbasket, or \"Choose Columns\" in Meds & Orders section of the refill encounter.              Passed - Medication is active on med list        Passed - Patient is age 18 or older        Passed - No active pregancy on record        Passed - No positive pregnancy test in past 12 months           "

## 2020-06-02 DIAGNOSIS — L70.0 ACNE VULGARIS: ICD-10-CM

## 2020-06-02 RX ORDER — TRETINOIN 0.25 MG/G
CREAM TOPICAL
Qty: 45 G | Refills: 11 | Status: SHIPPED | OUTPATIENT
Start: 2020-06-02 | End: 2022-08-31

## 2020-06-02 NOTE — TELEPHONE ENCOUNTER
Requested Prescriptions   Pending Prescriptions Disp Refills     tretinoin (RETIN-A) 0.025 % external cream 45 g 11     Sig: Apply a pea sized to entire face daily       There is no refill protocol information for this order        Last Written Prescription Date:    Last Fill Quantity: ,  # refills:    Last office visit: Visit date not found with prescribing provider:  Geeta Hollingsworth Office Visit:

## 2020-08-31 NOTE — NURSING NOTE
Chief Complaint   Patient presents with     Accutane       Initial /72  Pulse 78  LMP 03/04/2018  SpO2 99% There is no height or weight on file to calculate BMI.  Medication Reconciliation: complete     Additional Notes: Patient consent was obtained to proceed with the visit and recommended plan of care after discussion of all risks and benefits, including the risks of COVID-19 exposure. Detail Level: Simple

## 2020-10-30 DIAGNOSIS — L70.0 ACNE VULGARIS: ICD-10-CM

## 2020-10-30 NOTE — LETTER
[facility]  [address] [facility] [address]  Phone: [phone]    November 2, 2020      Kaci Davis                                                                                                                      [address] [facility] [address]            Dear Ms. Davis,    Many medications require routine follow-up with your Doctor.      At this time we ask that: You schedule a routine office visit with Geeta Gallagher PA-C in Dermatology.    Your prescription: Has been refilled for 1 month so you may have time for the above noted follow-up.      Thank you,    [facility]

## 2020-11-02 RX ORDER — SPIRONOLACTONE 50 MG/1
50 TABLET, FILM COATED ORAL DAILY
Qty: 30 TABLET | Refills: 0 | Status: SHIPPED | OUTPATIENT
Start: 2020-11-02 | End: 2020-11-20

## 2020-11-02 NOTE — TELEPHONE ENCOUNTER
Will give heath refill but it's been > 1 year since I've seen her. Please have her schedule a f/u visit.     Also I believe she is supposed to be on 150 mg?

## 2020-11-02 NOTE — TELEPHONE ENCOUNTER
[employer] message sent:    Dear Ms. Davis,    Many medications require routine follow-up with your Doctor.      At this time we ask that: You schedule a routine office visit with Geeta Gallagher PA-C in Dermatology.    Your prescription: Has been refilled for 1 month so you may have time for the above noted follow-up.      Thank you,    [facility]

## 2020-11-02 NOTE — TELEPHONE ENCOUNTER
[employer] letter sent:    Dear Ms. Davis,    Many medications require routine follow-up with your Doctor.      At this time we ask that: You schedule a routine office visit with Geeta Gallagher PA-C in Dermatology.    Your prescription: Has been refilled for 1 month so you may have time for the above noted follow-up.      Thank you,    [facility]

## 2020-11-02 NOTE — TELEPHONE ENCOUNTER
Routing refill request to provider for review/approval because:  Labs not current:  B/P / serum creatinine / serum potassium / serum sodium

## 2020-11-18 DIAGNOSIS — L70.0 ACNE VULGARIS: ICD-10-CM

## 2020-11-20 RX ORDER — SPIRONOLACTONE 100 MG/1
100 TABLET, FILM COATED ORAL DAILY
Qty: 30 TABLET | Refills: 0 | Status: SHIPPED | OUTPATIENT
Start: 2020-11-20 | End: 2020-12-01

## 2020-11-20 RX ORDER — SPIRONOLACTONE 50 MG/1
50 TABLET, FILM COATED ORAL DAILY
Qty: 30 TABLET | Refills: 0 | Status: SHIPPED | OUTPATIENT
Start: 2020-11-20 | End: 2020-12-01

## 2020-11-20 NOTE — TELEPHONE ENCOUNTER
Routing refill request to provider for review/approval because:  Labs not current:  B/P / serum creatinine / potassium / sodium

## 2020-11-23 NOTE — TELEPHONE ENCOUNTER
[employer] letter sent:    Dear Ms. Davis,    Many medications require routine follow-up with your Doctor.      At this time we ask that: You schedule a routine office visit with Geeta Gallagher PA-C in Dermatology.    Your prescription: Has been refilled for 1 month so you may have time for the above noted follow-up.      Thank you,    [facility]

## 2020-12-01 ENCOUNTER — OFFICE VISIT (OUTPATIENT)
Dept: DERMATOLOGY | Facility: CLINIC | Age: 30
End: 2020-12-01
Payer: COMMERCIAL

## 2020-12-01 VITALS — HEART RATE: 71 BPM | SYSTOLIC BLOOD PRESSURE: 118 MMHG | OXYGEN SATURATION: 99 % | DIASTOLIC BLOOD PRESSURE: 79 MMHG

## 2020-12-01 DIAGNOSIS — L70.0 ACNE VULGARIS: ICD-10-CM

## 2020-12-01 PROCEDURE — 99212 OFFICE O/P EST SF 10 MIN: CPT | Performed by: PHYSICIAN ASSISTANT

## 2020-12-01 RX ORDER — SPIRONOLACTONE 50 MG/1
50 TABLET, FILM COATED ORAL DAILY
Qty: 90 TABLET | Refills: 3 | Status: SHIPPED | OUTPATIENT
Start: 2020-12-01 | End: 2022-08-31

## 2020-12-01 RX ORDER — SPIRONOLACTONE 100 MG/1
100 TABLET, FILM COATED ORAL DAILY
Qty: 90 TABLET | Refills: 3 | Status: SHIPPED | OUTPATIENT
Start: 2020-12-01 | End: 2022-08-31

## 2020-12-01 RX ORDER — TRETINOIN 0.5 MG/G
CREAM TOPICAL
Qty: 45 G | Refills: 11 | Status: SHIPPED | OUTPATIENT
Start: 2020-12-01 | End: 2022-08-31

## 2020-12-01 RX ORDER — ETONOGESTREL AND ETHINYL ESTRADIOL VAGINAL RING .015; .12 MG/D; MG/D
RING VAGINAL
COMMUNITY
Start: 2020-02-10 | End: 2022-08-31

## 2020-12-01 RX ORDER — FLUOXETINE 40 MG/1
CAPSULE ORAL
COMMUNITY
Start: 2020-11-12

## 2020-12-01 RX ORDER — QUETIAPINE FUMARATE 25 MG/1
TABLET, FILM COATED ORAL
COMMUNITY
Start: 2020-11-10 | End: 2022-08-31

## 2020-12-01 NOTE — PROGRESS NOTES
HPI:   Kaci Davis is a 30 year old female who presents for recheck of acne. Currently on 150 mg of spironolactone and tretinoin 0.05% cream. She is doing very well and is pleased with control.    Condition has been present for: many years   Pt complains of pain: Yes     Previous treatments include: OTC treatments, isotretinoin, and spironolactone   Menstrual cycles are regular: Yes   Condition flares around period: Yes - has Mirena IUD  Areas Involved: face  IPLEDGE #: [id number]  SHx: She got  in May 2019      Current Outpatient Medications   Medication Sig Dispense Refill     etonogestrel-ethinyl estradiol (NUVARING) 0.12-0.015 MG/24HR vaginal ring INSERT 1 RING VAGINALLY  EVERY 4 WEEKS ON OR BEFORE  5TH DAY OF CYCLE REMOVE  AFTER 3 WEEKS AFTER 1 WEEK  BREAK INSERT A NEW RING       FLUoxetine (PROZAC) 40 MG capsule        QUEtiapine (SEROQUEL) 25 MG tablet        spironolactone (ALDACTONE) 100 MG tablet Take 1 tablet (100 mg) by mouth daily 30 tablet 0     spironolactone (ALDACTONE) 50 MG tablet Take 1 tablet (50 mg) by mouth daily 30 tablet 0     tretinoin (RETIN-A) 0.025 % external cream Apply a pea sized to entire face daily 45 g 11     levonorgestrel (MIRENA) 20 MCG/24HR IUD 1 each by Intrauterine route       sertraline (ZOLOFT) 25 MG tablet Take 2 tablets by mouth daily       sulfamethoxazole-trimethoprim (BACTRIM DS) 800-160 MG tablet 1 tab PO BID (Patient not taking: Reported on 9/10/2019) 60 tablet 2     tretinoin (RETIN-A) 0.05 % external cream Apply a pea sized amount to entire face daily (Patient not taking: Reported on 12/1/2020) 45 g 11     triamcinolone (KENALOG) 0.1 % external cream Apply twice daily to affected area on arms as needed for two weeks. (Patient not taking: Reported on 9/10/2019) 60 g 1     Allergies   Allergen Reactions     Augmentin Hives     Denies any other skin complaints, in general feels well: Yes  Review of symptoms otherwise negative:Yes      PHYSICAL EXAM:   A&Ox3:  Yes   Well developed/well nourished female Yes   Mood appropriate Yes      /79   Pulse 71   SpO2 99%   Type 2 skin. Mood appropriate  Alert and Oriented X 3. Well developed, well nourished in no distress.  General appearance: Normal  Head including face: Normal  Eyes: conjunctiva and lids: Normal  Mouth: Lips, teeth, gums: Normal  Neck: Normal  Back: clear  Cardiovascular: Exam of peripheral vascular system by observation for swelling, varicosities, edema: Normal  Extremities: digits/nails (clubbing): Normal  Right upper extremity: Normal  Left upper extremity: Normal  Right lower extremity: Normal  Left lower extremity: Normal  Skin: Scalp and body hair: See below     Comedones Papules/Pustules Cysts Staining Scarring   Face/Neck 0-1+ 0 0 0 0   Chest 0 0 0 0 0   Back 0 0 0 0 0     Telangiectasias: No Fixed Erythema: No Exoriations: No   Other Physical Exam Findings:    ASSESSMENT & PLAN:     1. Acne Vulgaris -. She is doing well on spironolactone and would like to continue this.  No adverse effects on spironolactone.   --Continue spironolactone 150 mg daily. Advised on potential for hypotension, teratogenetic effects, menstrual irregularities, hyperkalemia and need for Q 6 month K+ checks.   --Continue tretinoin 0.05% cream daily       Pt advised on use and risks including photosensitivity, allergic reactions, GI upset, headaches, nausea, erythema, scaling, vertigo, asthralgias, blood clots:Yes    Follow-up: yearly  CC:   Scribed By: Geeta Gallagher, MS, PAJeanineC

## 2020-12-01 NOTE — LETTER
12/1/2020         RE: Kaci Davis  [address] [facility] [address]        Dear Colleague,    Thank you for referring your patient, Kaci Davis, to the [facility]. Please see a copy of my visit note below.    HPI:   Kaci Davis is a 30 year old female who presents for recheck of acne. Currently on 150 mg of spironolactone and tretinoin 0.05% cream. She is doing very well and is pleased with control.    Condition has been present for: many years   Pt complains of pain: Yes     Previous treatments include: OTC treatments, isotretinoin, and spironolactone   Menstrual cycles are regular: Yes   Condition flares around period: Yes - has Mirena IUD  Areas Involved: face  IPLEDGE #: [id number]  SHx: She got  in May 2019      Current Outpatient Medications   Medication Sig Dispense Refill     etonogestrel-ethinyl estradiol (NUVARING) 0.12-0.015 MG/24HR vaginal ring INSERT 1 RING VAGINALLY  EVERY 4 WEEKS ON OR BEFORE  5TH DAY OF CYCLE REMOVE  AFTER 3 WEEKS AFTER 1 WEEK  BREAK INSERT A NEW RING       FLUoxetine (PROZAC) 40 MG capsule        QUEtiapine (SEROQUEL) 25 MG tablet        spironolactone (ALDACTONE) 100 MG tablet Take 1 tablet (100 mg) by mouth daily 30 tablet 0     spironolactone (ALDACTONE) 50 MG tablet Take 1 tablet (50 mg) by mouth daily 30 tablet 0     tretinoin (RETIN-A) 0.025 % external cream Apply a pea sized to entire face daily 45 g 11     levonorgestrel (MIRENA) 20 MCG/24HR IUD 1 each by Intrauterine route       sertraline (ZOLOFT) 25 MG tablet Take 2 tablets by mouth daily       sulfamethoxazole-trimethoprim (BACTRIM DS) 800-160 MG tablet 1 tab PO BID (Patient not taking: Reported on 9/10/2019) 60 tablet 2     tretinoin (RETIN-A) 0.05 % external cream Apply a pea sized amount to entire face daily (Patient not taking: Reported on 12/1/2020) 45 g 11     triamcinolone (KENALOG) 0.1 % external cream Apply twice daily to affected area on arms as needed  for two weeks. (Patient not taking: Reported on 9/10/2019) 60 g 1     Allergies   Allergen Reactions     Augmentin Hives     Denies any other skin complaints, in general feels well: Yes  Review of symptoms otherwise negative:Yes      PHYSICAL EXAM:   A&Ox3: Yes   Well developed/well nourished female Yes   Mood appropriate Yes      /79   Pulse 71   SpO2 99%   Type 2 skin. Mood appropriate  Alert and Oriented X 3. Well developed, well nourished in no distress.  General appearance: Normal  Head including face: Normal  Eyes: conjunctiva and lids: Normal  Mouth: Lips, teeth, gums: Normal  Neck: Normal  Back: clear  Cardiovascular: Exam of peripheral vascular system by observation for swelling, varicosities, edema: Normal  Extremities: digits/nails (clubbing): Normal  Right upper extremity: Normal  Left upper extremity: Normal  Right lower extremity: Normal  Left lower extremity: Normal  Skin: Scalp and body hair: See below     Comedones Papules/Pustules Cysts Staining Scarring   Face/Neck 0-1+ 0 0 0 0   Chest 0 0 0 0 0   Back 0 0 0 0 0     Telangiectasias: No Fixed Erythema: No Exoriations: No   Other Physical Exam Findings:    ASSESSMENT & PLAN:     1. Acne Vulgaris -. She is doing well on spironolactone and would like to continue this.  No adverse effects on spironolactone.   --Continue spironolactone 150 mg daily. Advised on potential for hypotension, teratogenetic effects, menstrual irregularities, hyperkalemia and need for Q 6 month K+ checks.   --Continue tretinoin 0.05% cream daily       Pt advised on use and risks including photosensitivity, allergic reactions, GI upset, headaches, nausea, erythema, scaling, vertigo, asthralgias, blood clots:Yes    Follow-up: yearly  CC:   Scribed By: Geeta Gallagher, MS, PATIA        Again, thank you for allowing me to participate in the care of your patient.        Sincerely,        Geeta Gallagher PA-C

## 2020-12-14 ENCOUNTER — HEALTH MAINTENANCE LETTER (OUTPATIENT)
Age: 30
End: 2020-12-14

## 2021-04-18 ENCOUNTER — HEALTH MAINTENANCE LETTER (OUTPATIENT)
Age: 31
End: 2021-04-18

## 2021-05-12 NOTE — TELEPHONE ENCOUNTER
Patient does need a refill of 100mg tablet of spironolactone as well- per chart note below.    ZAKAI Valenzuela-BSN-N  [facility]  [phone]  
What Type Of Note Output Would You Prefer (Optional)?: Standard Output
What Is The Reason For Today's Visit?: Full Body Skin Examination
What Is The Reason For Today's Visit? (Being Monitored For X): concerning skin lesions on an annual basis

## 2021-08-18 DIAGNOSIS — L70.0 ACNE VULGARIS: ICD-10-CM

## 2021-08-19 RX ORDER — SPIRONOLACTONE 50 MG/1
50 TABLET, FILM COATED ORAL DAILY
Qty: 90 TABLET | Refills: 3 | OUTPATIENT
Start: 2021-08-19

## 2021-08-19 RX ORDER — SPIRONOLACTONE 100 MG/1
100 TABLET, FILM COATED ORAL DAILY
Qty: 90 TABLET | Refills: 3 | OUTPATIENT
Start: 2021-08-19

## 2021-08-19 NOTE — TELEPHONE ENCOUNTER
"sent 12/1/2020 x one year- sent to pharmacy- too soon for refill request      Requested Prescriptions   Pending Prescriptions Disp Refills     spironolactone (ALDACTONE) 100 MG tablet 90 tablet 3     Sig: Take 1 tablet (100 mg) by mouth daily       Diuretics (Including Combos) Protocol Failed - 8/19/2021  8:31 AM        Failed - Normal serum creatinine on file in past 12 months     Recent Labs   Lab Test 09/11/18  1146   CR 0.80              Failed - Normal serum potassium on file in past 12 months     Recent Labs   Lab Test 09/11/18  1146   POTASSIUM 4.0                    Failed - Normal serum sodium on file in past 12 months     Recent Labs   Lab Test 09/11/18  1146                 Passed - Blood pressure under 140/90 in past 12 months     BP Readings from Last 3 Encounters:   12/01/20 118/79   09/10/19 111/76   05/07/19 105/72                 Passed - Recent (12 mo) or future (30 days) visit within the authorizing provider's specialty     Patient has had an office visit with the authorizing provider or a provider within the authorizing providers department within the previous 12 mos or has a future within next 30 days. See \"Patient Info\" tab in inbasket, or \"Choose Columns\" in Meds & Orders section of the refill encounter.              Passed - Medication is active on med list        Passed - Patient is age 18 or older        Passed - No active pregancy on record        Passed - No positive pregnancy test in past 12 months           spironolactone (ALDACTONE) 50 MG tablet 90 tablet 3     Sig: Take 1 tablet (50 mg) by mouth daily       Diuretics (Including Combos) Protocol Failed - 8/19/2021  8:31 AM        Failed - Normal serum creatinine on file in past 12 months     Recent Labs   Lab Test 09/11/18  1146   CR 0.80              Failed - Normal serum potassium on file in past 12 months     Recent Labs   Lab Test 09/11/18  1146   POTASSIUM 4.0                    Failed - Normal serum sodium on file in past " "12 months     Recent Labs   Lab Test 09/11/18  1146                 Passed - Blood pressure under 140/90 in past 12 months     BP Readings from Last 3 Encounters:   12/01/20 118/79   09/10/19 111/76   05/07/19 105/72                 Passed - Recent (12 mo) or future (30 days) visit within the authorizing provider's specialty     Patient has had an office visit with the authorizing provider or a provider within the authorizing providers department within the previous 12 mos or has a future within next 30 days. See \"Patient Info\" tab in inbasket, or \"Choose Columns\" in Meds & Orders section of the refill encounter.              Passed - Medication is active on med list        Passed - Patient is age 18 or older        Passed - No active pregancy on record        Passed - No positive pregnancy test in past 12 months             "

## 2021-09-03 DIAGNOSIS — L70.0 ACNE VULGARIS: ICD-10-CM

## 2021-09-03 RX ORDER — SPIRONOLACTONE 100 MG/1
100 TABLET, FILM COATED ORAL DAILY
Qty: 90 TABLET | Refills: 3 | OUTPATIENT
Start: 2021-09-03

## 2021-09-03 RX ORDER — SPIRONOLACTONE 50 MG/1
50 TABLET, FILM COATED ORAL DAILY
Qty: 90 TABLET | Refills: 3 | OUTPATIENT
Start: 2021-09-03

## 2021-09-03 NOTE — TELEPHONE ENCOUNTER
Requested Prescriptions   Pending Prescriptions Disp Refills     spironolactone (ALDACTONE) 50 MG tablet 90 tablet 3     Sig: Take 1 tablet (50 mg) by mouth daily   Last Written Prescription Date:  12/01/2020  Last Fill Quantity: 90 tablet,  # refills: 3   Last office visit: 12/1/2020 with prescribing provider:  Geeta Gallagher PA-C   Future Office Visit:      There is no refill protocol information for this order        spironolactone (ALDACTONE) 100 MG tablet 90 tablet 3     Sig: Take 1 tablet (100 mg) by mouth daily   Last Written Prescription Date:  12/01/2020  Last Fill Quantity: 90 tablet,  # refills: 3   Last office visit: 12/1/2020 with prescribing provider:  Geeta Gallagher PA-C   Future Office Visit:      There is no refill protocol information for this order

## 2021-10-02 ENCOUNTER — HEALTH MAINTENANCE LETTER (OUTPATIENT)
Age: 31
End: 2021-10-02

## 2022-05-14 ENCOUNTER — HEALTH MAINTENANCE LETTER (OUTPATIENT)
Age: 32
End: 2022-05-14

## 2022-07-30 PROBLEM — F32.A ANXIETY AND DEPRESSION: Status: ACTIVE | Noted: 2020-02-17

## 2022-07-30 PROBLEM — F41.9 ANXIETY AND DEPRESSION: Status: ACTIVE | Noted: 2020-02-17

## 2022-08-01 ENCOUNTER — TRANSCRIBE ORDERS (OUTPATIENT)
Dept: MATERNAL FETAL MEDICINE | Facility: CLINIC | Age: 32
End: 2022-08-01

## 2022-08-01 ENCOUNTER — OFFICE VISIT (OUTPATIENT)
Dept: OBGYN | Facility: CLINIC | Age: 32
End: 2022-08-01
Attending: REGISTERED NURSE
Payer: COMMERCIAL

## 2022-08-01 ENCOUNTER — ANCILLARY PROCEDURE (OUTPATIENT)
Dept: ULTRASOUND IMAGING | Facility: CLINIC | Age: 32
End: 2022-08-01
Attending: REGISTERED NURSE
Payer: COMMERCIAL

## 2022-08-01 VITALS
SYSTOLIC BLOOD PRESSURE: 101 MMHG | HEART RATE: 73 BPM | BODY MASS INDEX: 26.08 KG/M2 | DIASTOLIC BLOOD PRESSURE: 68 MMHG | WEIGHT: 172.1 LBS | HEIGHT: 68 IN

## 2022-08-01 DIAGNOSIS — Z34.91 NORMAL PREGNANCY IN FIRST TRIMESTER: Primary | ICD-10-CM

## 2022-08-01 DIAGNOSIS — Z33.1 PREGNANT STATE, INCIDENTAL: ICD-10-CM

## 2022-08-01 DIAGNOSIS — Z33.1 PREGNANT STATE, INCIDENTAL: Primary | ICD-10-CM

## 2022-08-01 DIAGNOSIS — F32.A ANXIETY AND DEPRESSION: ICD-10-CM

## 2022-08-01 DIAGNOSIS — F41.9 ANXIETY AND DEPRESSION: ICD-10-CM

## 2022-08-01 DIAGNOSIS — O26.90 PREGNANCY RELATED CONDITION, ANTEPARTUM: Primary | ICD-10-CM

## 2022-08-01 LAB
ABO/RH(D): NORMAL
ANTIBODY SCREEN: NEGATIVE
DEPRECATED CALCIDIOL+CALCIFEROL SERPL-MC: 55 UG/L (ref 20–75)
ERYTHROCYTE [DISTWIDTH] IN BLOOD BY AUTOMATED COUNT: 11.2 % (ref 10–15)
HBA1C MFR BLD: 4.9 % (ref 0–5.6)
HBV SURFACE AB SERPL IA-ACNC: 262.76 M[IU]/ML
HBV SURFACE AG SERPL QL IA: NONREACTIVE
HCT VFR BLD AUTO: 42.8 % (ref 35–47)
HCV AB SERPL QL IA: NONREACTIVE
HGB BLD-MCNC: 14.7 G/DL (ref 11.7–15.7)
HIV 1+2 AB+HIV1 P24 AG SERPL QL IA: NONREACTIVE
HOLD SPECIMEN: NORMAL
MCH RBC QN AUTO: 32.6 PG (ref 26.5–33)
MCHC RBC AUTO-ENTMCNC: 34.3 G/DL (ref 31.5–36.5)
MCV RBC AUTO: 95 FL (ref 78–100)
PLATELET # BLD AUTO: 202 10E3/UL (ref 150–450)
RBC # BLD AUTO: 4.51 10E6/UL (ref 3.8–5.2)
SPECIMEN EXPIRATION DATE: NORMAL
T PALLIDUM AB SER QL: NONREACTIVE
WBC # BLD AUTO: 7 10E3/UL (ref 4–11)

## 2022-08-01 PROCEDURE — 86780 TREPONEMA PALLIDUM: CPT | Performed by: REGISTERED NURSE

## 2022-08-01 PROCEDURE — 76817 TRANSVAGINAL US OBSTETRIC: CPT | Mod: 26 | Performed by: OBSTETRICS & GYNECOLOGY

## 2022-08-01 PROCEDURE — 86850 RBC ANTIBODY SCREEN: CPT | Performed by: REGISTERED NURSE

## 2022-08-01 PROCEDURE — 86706 HEP B SURFACE ANTIBODY: CPT | Performed by: REGISTERED NURSE

## 2022-08-01 PROCEDURE — 87389 HIV-1 AG W/HIV-1&-2 AB AG IA: CPT | Performed by: REGISTERED NURSE

## 2022-08-01 PROCEDURE — 86762 RUBELLA ANTIBODY: CPT | Performed by: REGISTERED NURSE

## 2022-08-01 PROCEDURE — 86803 HEPATITIS C AB TEST: CPT | Performed by: REGISTERED NURSE

## 2022-08-01 PROCEDURE — 86901 BLOOD TYPING SEROLOGIC RH(D): CPT | Performed by: REGISTERED NURSE

## 2022-08-01 PROCEDURE — 85027 COMPLETE CBC AUTOMATED: CPT | Performed by: REGISTERED NURSE

## 2022-08-01 PROCEDURE — G0463 HOSPITAL OUTPT CLINIC VISIT: HCPCS

## 2022-08-01 PROCEDURE — 87340 HEPATITIS B SURFACE AG IA: CPT | Performed by: REGISTERED NURSE

## 2022-08-01 PROCEDURE — 83036 HEMOGLOBIN GLYCOSYLATED A1C: CPT | Performed by: REGISTERED NURSE

## 2022-08-01 PROCEDURE — 99207 PR PRENATAL VISIT: CPT | Performed by: REGISTERED NURSE

## 2022-08-01 PROCEDURE — 76817 TRANSVAGINAL US OBSTETRIC: CPT

## 2022-08-01 PROCEDURE — 36415 COLL VENOUS BLD VENIPUNCTURE: CPT | Performed by: REGISTERED NURSE

## 2022-08-01 PROCEDURE — 82306 VITAMIN D 25 HYDROXY: CPT | Performed by: REGISTERED NURSE

## 2022-08-01 PROCEDURE — 87086 URINE CULTURE/COLONY COUNT: CPT | Performed by: REGISTERED NURSE

## 2022-08-01 RX ORDER — LAMOTRIGINE 100 MG/1
TABLET ORAL
COMMUNITY
Start: 2022-05-25 | End: 2024-04-30

## 2022-08-01 NOTE — PROGRESS NOTES
[facility] OB Intake note  Subjective   31 year old femalepresents to clinic for initiation of OB care. Patient's last menstrual period was 2022.  at 7w0d by Estimated Date of Delivery: Mar 20, 2023 based on US. Reports she had normal menses but was approximate with her LMP.  Reviewed dating ultrasound. Pregnancy is planned.    Partner name - Samuel.       She and her  live [place] minutes away in [place] [facility] [place] [facility], were unaware how far of a drive appointment was and will likely plan to establish care closer to home during pregnancy.      Symptoms since LMP include nausea. Patient has tried these relief measures: increased rest. Doesn't feel like she needs medication at this time.     - Genetic/Infection questionnaire completed, risks include aunt with Trisomy 21. Pt  does not have a recent known exposure to Parvo or CMV so IgG/IgM testing WILL NOT be ordered.   Covid vaccinated + booster  Flu shot received last season     Dr. Ag manages her Prozac for past 2 years. Started Lamictal 3 months ago with different provider who was aware she was trying to conceive, she has an appointment next week to review this again. Offered pharmacy referral appt, declines and will plan to f/u with provider who prescribed medication.     - Current Medications    Current Outpatient Medications   Medication Sig Dispense Refill     FLUoxetine (PROZAC) 40 MG capsule        lamoTRIgine (LAMICTAL) 100 MG tablet        etonogestrel-ethinyl estradiol (NUVARING) 0.12-0.015 MG/24HR vaginal ring INSERT 1 RING VAGINALLY  EVERY 4 WEEKS ON OR BEFORE  5TH DAY OF CYCLE REMOVE  AFTER 3 WEEKS AFTER 1 WEEK  BREAK INSERT A NEW RING (Patient not taking: Reported on 2022)       levonorgestrel (MIRENA) 20 MCG/24HR IUD 1 each by Intrauterine route (Patient not taking: Reported on 2022)       QUEtiapine (SEROQUEL) 25 MG tablet  (Patient not taking: Reported on 2022)       spironolactone (ALDACTONE) 100 MG tablet Take 1 tablet (100 mg) by mouth  daily (Patient not taking: Reported on 8/1/2022) 90 tablet 3     spironolactone (ALDACTONE) 50 MG tablet Take 1 tablet (50 mg) by mouth daily (Patient not taking: Reported on 8/1/2022) 90 tablet 3     sulfamethoxazole-trimethoprim (BACTRIM DS) 800-160 MG tablet 1 tab PO BID (Patient not taking: No sig reported) 60 tablet 2     tretinoin (RETIN-A) 0.025 % external cream Apply a pea sized to entire face daily (Patient not taking: Reported on 8/1/2022) 45 g 11     tretinoin (RETIN-A) 0.05 % external cream Apply a pea sized amount to entire face daily (Patient not taking: Reported on 8/1/2022) 45 g 11     triamcinolone (KENALOG) 0.1 % external cream Apply twice daily to affected area on arms as needed for two weeks. (Patient not taking: No sig reported) 60 g 1         - Co-morbids History reviewed. No pertinent past medical history.  - Risk for GDM: First degree relative with GDM or DM  so WILL have an early GCT and Hgb A1C    - High risk factors for Pre E-  No known risk factors of High risk for Pre E     Pregnant individuals at high risk of preeclampsia with one or more of the following risk factors:  History of preeclampsia, especially when accompanied by an adverse outcome  Multifetal gestation  Chronic hypertension  Pregestational type 1 or 2 diabetes  Kidney disease  Autoimmune disease (ie, systemic lupus erythematous, antiphospholipid syndrome)  Combinations of multiple moderate-risk factors    - Moderate risk factor for Pre E Nulliparity   Meets one high risk factors or one of the moderate risk facrtors  Nulliparity  Obesity (ie, body mass index > 30)  Family history of preeclampsia (ie, mother or sister)  Black race (as a proxy for underlying racism)  Lower income  Age 35 years or older  Personal history factors (eg, low birth weight or small for gestational age, previous adverse pregnancy outcome, >10-year pregnancy interval)  In vitro fertilization  so WILL NOT consider starting low dose aspirin (81mg)  starting between 12 and 28 weeks to prevent early onset preeclampsia      - The patient  does not have a history of spontaneous  birth so  WILL NOT consider progesterone starting at 16-20 weeks and/or serial transvaginal cervical length ultrasounds from 16-24 weeks.     -The patient does not have a history of immunosuppresion or HIV so Toxoplasma IgG/IgM WILL NOT be ordered.    -Assess risk for asymptomatic latent TB (prior infection, recent immigrant from epidemic areas, immunosuppression, living in overcrowded environment):   WILL NOT have PPD skin test or Quantiferon-TB Gold Plus blood draw. *both options valid*       PERSONAL/SOCIAL HISTORY    lives with their spouse.  Employment: Full time as a .  Job involves sedentary activity .  Her partner works as a .   History of anxiety or depression  On prozac and in therapy in [facility].  Additional items: Denies past or present domestic violence, sexual and psychological abuse.    Objective  -VS: reviewed and within normal limits   -General appearance: no acute distress, patient is comfortable   NEUROLOGICAL/PSYCHIATRIC   - Orientated x3,   -Mood and affect: : normal     Assessment/Plan  Kaci was seen today for prenatal care.    Diagnoses and all orders for this visit:    Normal pregnancy in first trimester  -     ABO/Rh type and screen; Future  -     Rubella Antibody IgG; Future  -     Hepatitis B Surface Antibody; Future  -     Hepatitis B surface antigen; Future  -     Hepatitis C antibody; Future  -     Vitamin D Deficiency; Future  -     HIV Antigen Antibody Combo; Future  -     CBC with platelets; Future  -     Urine Culture; Future  -     Treponema Abs w Reflex to RPR and Titer; Future  -     Hgb A1c; Future  -     Mat Fetal Med Ctr Referral - Pregnancy; Future    Anxiety and depression        31 year old  7w0d weeks of pregnancy with SIENNA of Mar 20, 2023 by LMP of Patient's last menstrual period was  06/06/2022.. Ultrasound confirms.   Outpatient Encounter Medications as of 8/1/2022   Medication Sig Dispense Refill     FLUoxetine (PROZAC) 40 MG capsule        lamoTRIgine (LAMICTAL) 100 MG tablet        etonogestrel-ethinyl estradiol (NUVARING) 0.12-0.015 MG/24HR vaginal ring INSERT 1 RING VAGINALLY  EVERY 4 WEEKS ON OR BEFORE  5TH DAY OF CYCLE REMOVE  AFTER 3 WEEKS AFTER 1 WEEK  BREAK INSERT A NEW RING (Patient not taking: Reported on 8/1/2022)       levonorgestrel (MIRENA) 20 MCG/24HR IUD 1 each by Intrauterine route (Patient not taking: Reported on 8/1/2022)       QUEtiapine (SEROQUEL) 25 MG tablet  (Patient not taking: Reported on 8/1/2022)       spironolactone (ALDACTONE) 100 MG tablet Take 1 tablet (100 mg) by mouth daily (Patient not taking: Reported on 8/1/2022) 90 tablet 3     spironolactone (ALDACTONE) 50 MG tablet Take 1 tablet (50 mg) by mouth daily (Patient not taking: Reported on 8/1/2022) 90 tablet 3     sulfamethoxazole-trimethoprim (BACTRIM DS) 800-160 MG tablet 1 tab PO BID (Patient not taking: No sig reported) 60 tablet 2     tretinoin (RETIN-A) 0.025 % external cream Apply a pea sized to entire face daily (Patient not taking: Reported on 8/1/2022) 45 g 11     tretinoin (RETIN-A) 0.05 % external cream Apply a pea sized amount to entire face daily (Patient not taking: Reported on 8/1/2022) 45 g 11     triamcinolone (KENALOG) 0.1 % external cream Apply twice daily to affected area on arms as needed for two weeks. (Patient not taking: No sig reported) 60 g 1     [DISCONTINUED] sertraline (ZOLOFT) 25 MG tablet Take 2 tablets by mouth daily       No facility-administered encounter medications on file as of 8/1/2022.      Orders Placed This Encounter   Procedures     Rubella Antibody IgG     Hepatitis B Surface Antibody     Hepatitis B surface antigen     Hepatitis C antibody     Vitamin D Deficiency     HIV Antigen Antibody Combo     CBC with platelets     Treponema Abs w Reflex to RPR and Titer      Hgb A1c     Mat Fetal Med Ctr Referral - Pregnancy                 Orders Placed This Encounter   Procedures     Rubella Antibody IgG     Hepatitis B Surface Antibody     Hepatitis B surface antigen     Hepatitis C antibody     Vitamin D Deficiency     HIV Antigen Antibody Combo     CBC with platelets     Treponema Abs w Reflex to RPR and Titer     Hgb A1c     Mat Fetal Med Ctr Referral - Pregnancy     ABO/Rh type and screen       - Oriented to Practice, types of care, and how to reach a provider.  Pt prefers Undecided between CNM and MD, will continue to consider.   - Patient received 1st trimester new OB education packet complete with aide of [facility] booklet including information on genetic screening test options.  - Patient declines all genetic screening, may consider further at [facility]. Level II US ordered only.   - Educational handout on the prevention of infections diseases during pregnancy provided.  - Patient was encouraged to start prenatal vitamins as tolerated.    - Patient was sent to lab for routine OB labs including Hgba1c.   - Reviewed risk for diabetes in pregnancy, pt agrees to early 1 hour plan for NOB visit.  - Pregnancy concerns to be addressed by provider at new OB exam include: none.  - Pap due at [facility], pap NILM 5/13/2019    Pt to RTO for NOB visit in 4 weeks and prn if questions or concerns    SONIDO Trimble CNM

## 2022-08-01 NOTE — LETTER
8/1/2022       RE: Kaci Davis  [address] [facility] [address]     Dear Colleague,    Thank you for referring your patient, Kaci Davis, to the [facility]. Please see a copy of my visit note below.    No notes on file    Again, thank you for allowing me to participate in the care of your patient.      Sincerely,    SONIDO Trimble CNM

## 2022-08-01 NOTE — LETTER
Date:August 1, 2022      Provider requested that no letter be sent. Do not send.       [facility]

## 2022-08-02 LAB
RUBV IGG SERPL QL IA: 4.54 INDEX
RUBV IGG SERPL QL IA: POSITIVE

## 2022-08-03 LAB — BACTERIA UR CULT: NORMAL

## 2022-08-31 ENCOUNTER — E-VISIT (OUTPATIENT)
Dept: URGENT CARE | Facility: CLINIC | Age: 32
End: 2022-08-31
Payer: COMMERCIAL

## 2022-08-31 ENCOUNTER — MEDICAL CORRESPONDENCE (OUTPATIENT)
Dept: PHARMACY | Facility: CLINIC | Age: 32
End: 2022-08-31

## 2022-08-31 ENCOUNTER — VIRTUAL VISIT (OUTPATIENT)
Dept: URGENT CARE | Facility: CLINIC | Age: 32
End: 2022-08-31
Payer: COMMERCIAL

## 2022-08-31 DIAGNOSIS — U07.1 COVID-19: Primary | ICD-10-CM

## 2022-08-31 DIAGNOSIS — Z3A.11 11 WEEKS GESTATION OF PREGNANCY: ICD-10-CM

## 2022-08-31 DIAGNOSIS — U07.1 INFECTION DUE TO 2019 NOVEL CORONAVIRUS: Primary | ICD-10-CM

## 2022-08-31 PROCEDURE — 99213 OFFICE O/P EST LOW 20 MIN: CPT | Mod: CS

## 2022-08-31 PROCEDURE — 99421 OL DIG E/M SVC 5-10 MIN: CPT | Performed by: NURSE PRACTITIONER

## 2022-08-31 NOTE — PROGRESS NOTES
Kaci Davis is being evaluated via a billable video visit.      Assessment & Plan:     Pt eligible for COVID treatment due to: pregnancy, anxiety/depression  Symptom onset within last 7 days and non-severe.   Discussed COVID treatments have not been studied extensively in pregnancy but monoclonal antibody treatment is recommended due to higher risk of hospitalization/ICU admission for pregnant mother with COVID, and increased risk to baby. She is interested in MAB treatment, referral placed. Also encouraged her to discuss further with her OB provider if she has further questions.     See patient instructions below.  At the end of the encounter, I discussed results, diagnosis, medications. Discussed red flags for being seen in person at clinic/ER, as well as indications for follow up if no improvement. Patient understood and agreed to plan.       ICD-10-CM    1. Infection due to 2019 novel coronavirus  U07.1 Covid Monoclonal Antibody Referral   2. 11 weeks gestation of pregnancy  Z3A.11          No follow-ups on file.    Video-Visit Details    Type of service:  Video Visit    Video Start Time: 10:28am  Video End Time: 10:34am    Originating Location (pt. Location): Home    Distant Location (provider location):   [facility]     Platform used for Video Visit: MARGARET Cao, YANG  [email or web address] UNSCHEDULED CARE    Subjective:   Kaci Davis is a 31 year old female who is contacted via telephone thru scheduled urgent care virtual visit to discuss:   Chief Complaint   Patient presents with     Covid Concern       Cough, sore throat, rhinorrhea, congestion, HA, and myalgias onset 3 days ago. She tested positive for COVID on 8/29. She is 11 weeks pregnant. She had a subjective fever initially but that has resolved.  No treatments tried. Patient reports no chest pain, shortness of breath, abdominal pain, vaginal bleeding, nausea, vomiting, diarrhea, rash, or any other symptoms.      She has established care with OB/GYN.    No past medical history on file.    Objective:   Gen:  NAD  Pulm: non-labored work of breathing    No results found for any visits on 08/31/22.    There are no Patient Instructions on file for this visit.

## 2022-08-31 NOTE — PATIENT INSTRUCTIONS
Ronan Clemons,  Please call [phone] option [phone] and they will schedule you for virtual urgent care appointment with a provider today. They can then discuss treatment options.  SONIDO Antunez CNP

## 2022-09-03 ENCOUNTER — HEALTH MAINTENANCE LETTER (OUTPATIENT)
Age: 32
End: 2022-09-03

## 2022-09-13 NOTE — LETTER
[address] [address]  Phone: [phone]  Fax: [phone]    November 23, 2020      Kaciwong Davis                                                                                                                  [address] [place] [id number]            Dear Ms. Davis,    Many medications require routine follow-up with your Doctor.      At this time we ask that: You schedule a routine office visit with Geeta Gallagher PA-C in Dermatology.    Your prescription: Has been refilled for 1 month so you may have time for the above noted follow-up.      Thank you,    [facility]            Detail Level: Generalized Sunscreen Recommendations: Broad spectrum zinc oxide 30-50 SPF applied through the day Skin Checks Recommendations: Photo protective clothing, broad spectrum zinc oxide 50 SPF Detail Level: Detailed Sunscreen Recommendations: Discussed importance of continual photo protection with zinc/titanium based sunscreen and photo protective clothing. Detail Level: Zone Bleaching Agents Recommendations: Discussed that bleaching creams may be helpful but discoloration will recur with sun exposure Sunscreen Recommendations: Discussed improvement of continual sun protection with zinc based sunscreen and photo protective clothing.\\nRecommended daily 30-50 SPF Laser Recommendations: Discussed laser treatments can be helpful to lighten. Treatment is considered cosmetic and not covered by insurance Sunscreen Recommendations: Zinc & titanium based daily sunscreen minimum of 30SPF, photo protective clothing Detail Level: Simple Sunscreen Recommendations: Discussed continual use of photo protection with sun screen and photo protective clothing Moisturizer Recommendations: Recommended to use a thick moisturizer like CeraVe Cream multiple times a day

## 2022-09-14 ENCOUNTER — PRENATAL OFFICE VISIT (OUTPATIENT)
Dept: MIDWIFE SERVICES | Facility: CLINIC | Age: 32
End: 2022-09-14
Payer: COMMERCIAL

## 2022-09-14 VITALS — BODY MASS INDEX: 26.3 KG/M2 | DIASTOLIC BLOOD PRESSURE: 62 MMHG | WEIGHT: 173 LBS | SYSTOLIC BLOOD PRESSURE: 114 MMHG

## 2022-09-14 DIAGNOSIS — Z34.02 ENCOUNTER FOR SUPERVISION OF NORMAL FIRST PREGNANCY IN SECOND TRIMESTER: ICD-10-CM

## 2022-09-14 DIAGNOSIS — Z12.4 PAP SMEAR FOR CERVICAL CANCER SCREENING: Primary | ICD-10-CM

## 2022-09-14 PROCEDURE — 87624 HPV HI-RISK TYP POOLED RSLT: CPT

## 2022-09-14 PROCEDURE — 87591 N.GONORRHOEAE DNA AMP PROB: CPT

## 2022-09-14 PROCEDURE — 87491 CHLMYD TRACH DNA AMP PROBE: CPT

## 2022-09-14 PROCEDURE — G0145 SCR C/V CYTO,THINLAYER,RESCR: HCPCS

## 2022-09-14 PROCEDURE — 36415 COLL VENOUS BLD VENIPUNCTURE: CPT

## 2022-09-14 PROCEDURE — 99207 PR FIRST OB VISIT: CPT

## 2022-09-14 NOTE — NURSING NOTE
"Chief Complaint   Patient presents with     Prenatal Care     NPN 13w 2d, pap due       Initial /62 (BP Location: Left arm, Cuff Size: Adult Regular)   Wt 78.5 kg (173 lb)   LMP 2022   BMI 26.30 kg/m   Estimated body mass index is 26.3 kg/m  as calculated from the following:    Height as of 22: 1.727 m (5' 8\").    Weight as of this encounter: 78.5 kg (173 lb).  BP completed using cuff size: regular    Questioned patient about current smoking habits.  Pt. has never smoked.          The following HM Due: pap smear  Chalmydia ()      "

## 2022-09-14 NOTE — PROGRESS NOTES
Kaci Davis is a 31 year old   woman,  who is not a previous CNM patient. She presents for a new OB Visit. This was a planned pregnancy.     FOB is Samuel who is in good health.  FOB IS actively involved in relationship and this pregnancy.       She has not had bleeding since her LMP.    She denies abdominal pain since her LMP.  She has had nausea.  has had vomiting.  Any personal or family history of blood clots? No  History of sickle cell anemia or trait? No         Patient's last menstrual period was 2022..  Estimated Date of Delivery: Mar 20, 2023 Ultrasound consistent with LMP.    MENSTRUAL HISTORY    frequency: every 28-30 days  Last PAP:  NIL  History of abnormal Pap?  No    Health maintenance updated:  yes        Current medications are:    Current Outpatient Medications:      FLUoxetine (PROZAC) 40 MG capsule, , Disp: , Rfl:      lamoTRIgine (LAMICTAL) 100 MG tablet, , Disp: , Rfl:      Prenatal Vit-Fe Fumarate-FA (PRENATAL PO), , Disp: , Rfl:      What medications are you currently taking? Prozac     INFECTION HISTORY  HIV: No  Hepatitis B: No  Hepatitis C: No  Tuberculosis: No  Last PPD   Genital Herpes self: no  Herpes partner:  no  Chlamydia:  no  Gonorrhea:  no  HPV: No  BV:  No  Syphilis:  No  Chicken Pox:  Yes - childhood      OB HISTORY  OB History    Para Term  AB Living   1 0 0 0 0 0   SAB IAB Ectopic Multiple Live Births   0 0 0 0 0      # Outcome Date GA Lbr Luis/2nd Weight Sex Delivery Anes PTL Lv   1 Current                History of GDM: No,  PTL : No,  History of HTN in pregnancy: No,  Thrombocytopenia: No,  Shoulder dystocia: No,  Vacuum Extraction: No  PPH: No   3rd of 4th degree laceration: No.   Other complications: No     Low Dose Aspirin for Preeclampsia Prevention:  Consider for those with 1 high risk or 2  moderate risk factors     High risk: previous pregnancy with preeclampsia, multifetal gestation, chronic htn, diabetes, chronic kidney  disease, autoimmune disorder     Medium risk: nulliparity, BMI >30, family hx preeclampsia, age >/= 35,  race, low SES, personal risk factors (hx low birth weight, hx stillbirth, >10yrs between pregnancies).      Patient qualify for low dose aspirin therapy        PERSONAL HISTORY  Exercise Habits:  walking and weight training 1-2 days per week.  Employment: Full time.  Her job involves sedentary activity with no potential for toxic exposure.    Travel plans:  are [place] in couple of weeks by airplane.   Diet: eats regular meals  Prenatal vitamins? Yes:   Fish Oil? No  Abuse concerns? No  Any history if abuse, varbal, physical, sexual? No  Hgb A1c screen:  BMI > 30: No, 1st degree family DM: Yes, History of GDM: No, PCOS: No, High risk ethnicity: No    Social History     Socioeconomic History     Marital status:      Spouse name: Not on file     Number of children: Not on file     Years of education: Not on file     Highest education level: Not on file   Occupational History     Not on file   Tobacco Use     Smoking status: Never Smoker     Smokeless tobacco: Never Used   Substance and Sexual Activity     Alcohol use: Not on file     Drug use: Not on file     Sexual activity: Not on file   Other Topics Concern     Not on file   Social History Narrative     Not on file     Social Determinants of Health     Financial Resource Strain: Not on file   Food Insecurity: Not on file   Transportation Needs: Not on file   Physical Activity: Not on file   Stress: Not on file   Social Connections: Not on file   Intimate Partner Violence: Not At Risk     Fear of Current or Ex-Partner: No     Emotionally Abused: No     Physically Abused: No     Sexually Abused: No   Housing Stability: Not on file         She  reports that she has never smoked. She has never used smokeless tobacco.    STD testing offered?  Accepted  Last PHQ-9 score on record = No flowsheet data found.  Last GAD7 score on record = No  flowsheet data found.  Alcohol Score = 0  Referral/Meds needed? yes    PAST MEDICAL/SURGICAL HISTORY  No past medical history on file.  Past Surgical History:   Procedure Laterality Date     KNEE SURGERY Bilateral 2008    repair of both knees     LEG SURGERY Right 2016    r leg       FAMILY HISTORY  Family History   Problem Relation Age of Onset     Breast Cancer Mother      Hypertension Mother      Diabetes Father      Bipolar Disorder Sister      Melanoma Maternal Grandmother          ROS:  CONSTITUTIONAL: NEGATIVE for fever, chills, change in weight  INTEGUMENTARU/SKIN: NEGATIVE for worrisome rashes, moles or lesions  EYES: NEGATIVE for vision changes or irritation  ENT: NEGATIVE for ear, mouth and throat problems  RESP: NEGATIVE for significant cough or SOB  BREAST: NEGATIVE for masses, tenderness or discharge  CV: NEGATIVE for chest pain, palpitations or peripheral edema  GI: NEGATIVE for nausea, abdominal pain, heartburn, or change in bowel habits  : NEGATIVE for unusual urinary or vaginal symptoms. Periods are regular.  MUSCULOSKELETAL: NEGATIVE for significant arthralgias or myalgia  NEURO: NEGATIVE for weakness, dizziness or paresthesias  PSYCHIATRIC: NEGATIVE for changes in mood or affect    PHYSICAL EXAM  Vitals: /62 (BP Location: Left arm, Cuff Size: Adult Regular)   Wt 78.5 kg (173 lb)   LMP 06/06/2022   BMI 26.30 kg/m    BMI= Body mass index is 26.3 kg/m .     GENERAL:  31 year old pleasant pregnant female, alert, cooperative and well groomed.  NECK:  Thyroid without enlargement and nodules.  Lymph nodes not palpable.   LUNGS:  Clear to auscultation.  BREAST:  Symmetrical without lesions or nodes.  Nipples everted.  Areolas symmetric.  No palpable axillary nodes.  HEART:  RRR without murmur.  ABDOMEN: Soft without masses or tenderness.  No scars noted..  GENITALIA: BUS without tenderness or inflammation.  Perineum without lesions.    VAGINA:  Pink, normal rugae and discharge.  CERVIX:  Mid,  smooth, without discharge, and firm/ closed, long.   UTERUS: Midposition, nontender   ADNEXA: Without masses or tenderness  RECTAL:  Normal appearance.  Digital exam deferred.  LOWER EXTREMITIES: No edema. No significant varicosities.    ASSESSMENT/PLAN:    IUP at 13w2d    ICD-10-CM    1. Pap smear for cervical cancer screening  Z12.4 Pap screen with HPV - recommended age 30 - 65 years     Non Invasive Prenatal Test Cell Free DNA     Non Invasive Prenatal Test Cell Free DNA   2. Encounter for supervision of normal first pregnancy in second trimester  Z34.02 Pap screen with HPV - recommended age 30 - 65 years     NEISSERIA GONORRHOEA PCR     CHLAMYDIA TRACHOMATIS PCR     Non Invasive Prenatal Test Cell Free DNA     Non Invasive Prenatal Test Cell Free DNA     Process and hold DNA     Process and hold DNA        consult for US for AMA patients: No   Genetic Testing reviewed and discussed, patient desires NIPT. Handout provided (FOR RETURN LABS)    COUNSELING    Instructed on use of triage nurse line and contacting the on call CNM after hours in an emergency.     Symptoms of N&V and fatigue usually start to resolve around 12-16 weeks     Reviewed CNM philosophy, call schedule for labor and delivery, and [facility] for delivery    1st OB handout given outlining appointment spacing and CNM information    Reviewed exercise and nutrition    Recommend to gain 25 pounds with her pregnancy.    Discussed OTC medications. OB med list given    Encouraged patient to arrange  if needed    Encouraged patient to take PNV's/DHA    Travel precautions discussed, no air travel after 36 weeks and Zika Virus discussed    Will call patient with lab results when available    Does patient desire a RN home visit from the [facility]?  No    If yes, paperwork completed?  No     F/U to be addressed next visit:, Rx's given, referrals    Will return to the clinic in 4 weeks for her next routine prenatal check.  Will call to be seen sooner if  problems arise.    SONIDO VINSON CNM

## 2022-09-15 LAB
C TRACH DNA SPEC QL NAA+PROBE: NEGATIVE
N GONORRHOEA DNA SPEC QL NAA+PROBE: NEGATIVE

## 2022-09-17 LAB
BKR LAB AP GYN ADEQUACY: NORMAL
BKR LAB AP GYN INTERPRETATION: NORMAL
BKR LAB AP HPV REFLEX: NORMAL
BKR LAB AP PREVIOUS ABNORMAL: NORMAL
PATH REPORT.COMMENTS IMP SPEC: NORMAL
PATH REPORT.COMMENTS IMP SPEC: NORMAL
PATH REPORT.RELEVANT HX SPEC: NORMAL

## 2022-09-19 LAB
HUMAN PAPILLOMA VIRUS 16 DNA: NEGATIVE
HUMAN PAPILLOMA VIRUS 18 DNA: NEGATIVE
HUMAN PAPILLOMA VIRUS FINAL DIAGNOSIS: NORMAL
HUMAN PAPILLOMA VIRUS OTHER HR: NEGATIVE

## 2022-09-22 LAB — SCANNED LAB RESULT: NORMAL

## 2022-10-03 ENCOUNTER — MYC MEDICAL ADVICE (OUTPATIENT)
Dept: MIDWIFE SERVICES | Facility: CLINIC | Age: 32
End: 2022-10-03

## 2022-10-03 NOTE — TELEPHONE ENCOUNTER
Form completed and signed by Sariah Carrera. Waiting for response from pt for how she would like to receive a copy.

## 2022-10-14 ENCOUNTER — PRENATAL OFFICE VISIT (OUTPATIENT)
Dept: OBGYN | Facility: CLINIC | Age: 32
End: 2022-10-14
Payer: COMMERCIAL

## 2022-10-14 ENCOUNTER — TRANSCRIBE ORDERS (OUTPATIENT)
Dept: MATERNAL FETAL MEDICINE | Facility: CLINIC | Age: 32
End: 2022-10-14

## 2022-10-14 VITALS — BODY MASS INDEX: 26.61 KG/M2 | SYSTOLIC BLOOD PRESSURE: 104 MMHG | WEIGHT: 175 LBS | DIASTOLIC BLOOD PRESSURE: 62 MMHG

## 2022-10-14 DIAGNOSIS — Z34.92 NORMAL PREGNANCY IN SECOND TRIMESTER: Primary | ICD-10-CM

## 2022-10-14 DIAGNOSIS — O26.90 PREGNANCY RELATED CONDITION: Primary | ICD-10-CM

## 2022-10-14 PROCEDURE — 99207 PR PRENATAL VISIT: CPT | Performed by: OBSTETRICS & GYNECOLOGY

## 2022-10-14 NOTE — PROGRESS NOTES
Doing well.   Inquires about lifting weights and exercising. Also has a rash on her lower abdomen, that she thinks is related to contact allergy from expander on pants she wore; currently using cortisol cream to alleviate the rash which is helpful.   Denies VB, cramping.   Reports flutters.   /62   Wt 79.4 kg (175 lb)   LMP 2022   BMI 26.61 kg/m    General Appearance: NAD  Abdomen: Gravid, NT  Refer to flow sheet above.   A/P: 32 year old  at 17w4d  -- inquires and concerns addressed, reassurance provided  -- reviewed CNM vs MD care; patient is indifferent to care she gets, will schedule with which every group and will continue to stay with them  -- MFM fetal anatomy ultrasound ordered due to hx of selective serotonin reuptake inhibitor use  -- SAB precautions reviewed  RTC in 4 weeks     Enoch Gasca MD  [facility]

## 2022-10-14 NOTE — NURSING NOTE
"Chief Complaint   Patient presents with     Prenatal Care            Initial /62   Wt 79.4 kg (175 lb)   LMP 2022   BMI 26.61 kg/m   Estimated body mass index is 26.61 kg/m  as calculated from the following:    Height as of 22: 1.727 m (5' 8\").    Weight as of this encounter: 79.4 kg (175 lb).  BP completed using cuff size: regular    Questioned patient about current smoking habits.  Pt. has never smoked.          Christin Castillo, BANDAR              "

## 2022-10-17 ENCOUNTER — MYC MEDICAL ADVICE (OUTPATIENT)
Dept: OBGYN | Facility: CLINIC | Age: 32
End: 2022-10-17

## 2022-10-21 NOTE — TELEPHONE ENCOUNTER
Pt calling.   She needs the forms completed today.   She would like to pick them up at the [facility] office.    Please call the pt when they are completed.      Ysabel BARROSO RN

## 2022-10-21 NOTE — TELEPHONE ENCOUNTER
Form completed, left at the [facility]  for  and copies sent to scan and fax bin  Miri Rodriguez CMA

## 2022-11-01 ENCOUNTER — PRENATAL OFFICE VISIT (OUTPATIENT)
Dept: OBGYN | Facility: CLINIC | Age: 32
End: 2022-11-01
Payer: COMMERCIAL

## 2022-11-01 VITALS — SYSTOLIC BLOOD PRESSURE: 82 MMHG | BODY MASS INDEX: 26.87 KG/M2 | WEIGHT: 176.7 LBS | DIASTOLIC BLOOD PRESSURE: 58 MMHG

## 2022-11-01 DIAGNOSIS — Z34.92 NORMAL PREGNANCY IN SECOND TRIMESTER: Primary | ICD-10-CM

## 2022-11-01 PROCEDURE — 99207 PR PRENATAL VISIT: CPT | Performed by: OBSTETRICS & GYNECOLOGY

## 2022-11-01 NOTE — NURSING NOTE
"Chief Complaint   Patient presents with     Prenatal Care   20w1d  No concerns    initial BP (!) 82/58   Wt 80.2 kg (176 lb 11.2 oz)   LMP 06/06/2022   BMI 26.87 kg/m   Estimated body mass index is 26.87 kg/m  as calculated from the following:    Height as of 8/1/22: 1.727 m (5' 8\").    Weight as of this encounter: 80.2 kg (176 lb 11.2 oz).  BP completed using cuff size regular.  Miri Rodriguez CMA    "

## 2022-11-02 ENCOUNTER — PRE VISIT (OUTPATIENT)
Dept: MATERNAL FETAL MEDICINE | Facility: CLINIC | Age: 32
End: 2022-11-02

## 2022-11-09 ENCOUNTER — OFFICE VISIT (OUTPATIENT)
Dept: MATERNAL FETAL MEDICINE | Facility: CLINIC | Age: 32
End: 2022-11-09
Attending: OBSTETRICS & GYNECOLOGY
Payer: COMMERCIAL

## 2022-11-09 ENCOUNTER — HOSPITAL ENCOUNTER (OUTPATIENT)
Dept: ULTRASOUND IMAGING | Facility: CLINIC | Age: 32
Discharge: HOME | End: 2022-11-09
Attending: OBSTETRICS & GYNECOLOGY
Payer: COMMERCIAL

## 2022-11-09 DIAGNOSIS — O26.90 PREGNANCY RELATED CONDITION: ICD-10-CM

## 2022-11-09 DIAGNOSIS — O98.511 COVID-19 AFFECTING PREGNANCY IN FIRST TRIMESTER: Primary | ICD-10-CM

## 2022-11-09 DIAGNOSIS — U07.1 COVID-19 AFFECTING PREGNANCY IN FIRST TRIMESTER: Primary | ICD-10-CM

## 2022-11-09 PROCEDURE — 76811 OB US DETAILED SNGL FETUS: CPT | Mod: 26 | Performed by: OBSTETRICS & GYNECOLOGY

## 2022-11-09 PROCEDURE — 76811 OB US DETAILED SNGL FETUS: CPT

## 2022-11-09 NOTE — PROGRESS NOTES
Please see full imaging report from [employer] program under imaging tab.    Jignesh Medeiros MD  Maternal Fetal Medicine

## 2022-12-08 ENCOUNTER — PRENATAL OFFICE VISIT (OUTPATIENT)
Dept: OBGYN | Facility: CLINIC | Age: 32
End: 2022-12-08
Payer: COMMERCIAL

## 2022-12-08 VITALS — SYSTOLIC BLOOD PRESSURE: 98 MMHG | DIASTOLIC BLOOD PRESSURE: 58 MMHG | BODY MASS INDEX: 27.26 KG/M2 | WEIGHT: 179.3 LBS

## 2022-12-08 DIAGNOSIS — Z34.92 NORMAL PREGNANCY IN SECOND TRIMESTER: Primary | ICD-10-CM

## 2022-12-08 PROCEDURE — 99207 PR PRENATAL VISIT: CPT | Performed by: FAMILY MEDICINE

## 2022-12-08 NOTE — NURSING NOTE
"25w3d  Chief Complaint   Patient presents with     Prenatal Care       Initial BP 98/58   Wt 81.3 kg (179 lb 4.8 oz)   LMP 2022   BMI 27.26 kg/m   Estimated body mass index is 27.26 kg/m  as calculated from the following:    Height as of 22: 1.727 m (5' 8\").    Weight as of this encounter: 81.3 kg (179 lb 4.8 oz).  BP completed using cuff size: regular    Questioned patient about current smoking habits.  Pt. has never smoked.          The following HM Due: NONE    "

## 2022-12-08 NOTE — PROGRESS NOTES
CC: Here for routine prenatal visit   32 year old y/o  @ 25w3d with Estimated Date of Delivery: Mar 20, 2023     BP 98/58   Wt 81.3 kg (179 lb 4.8 oz)   LMP 2022   BMI 27.26 kg/m    See OB flowsheet  + fetal movement, no contractions, no bleeding, no loss of fluid   Discussed monitoring fetal movement     1) concerns: none today  Covid-19 concerns: covid infection and vaccination recommendations discussed.   2) Routine: Blood type O+ RI tdap [ ] flu [x] GS [x ] NIPT [nl, XX] AFP [not done]   Covid v [v, booster x 1] gtt [ ]  3) Risk factors:   A: Covid + @ 11 weeeks: level II, growth @ 32 weeks    S/p monoclonal antibodies, plans 2nd booster later   B: Anxiety/depression: lamictal/prozac    4) Return: return in 4 weeks, plan glucose test next visit     Donny

## 2022-12-27 ENCOUNTER — PRENATAL OFFICE VISIT (OUTPATIENT)
Dept: OBGYN | Facility: CLINIC | Age: 32
End: 2022-12-27
Payer: COMMERCIAL

## 2022-12-27 VITALS — BODY MASS INDEX: 27.67 KG/M2 | DIASTOLIC BLOOD PRESSURE: 64 MMHG | SYSTOLIC BLOOD PRESSURE: 102 MMHG | WEIGHT: 182 LBS

## 2022-12-27 DIAGNOSIS — O98.511 COVID-19 AFFECTING PREGNANCY IN FIRST TRIMESTER: ICD-10-CM

## 2022-12-27 DIAGNOSIS — O99.340 ANXIETY DURING PREGNANCY: Primary | ICD-10-CM

## 2022-12-27 DIAGNOSIS — U07.1 COVID-19 AFFECTING PREGNANCY IN FIRST TRIMESTER: ICD-10-CM

## 2022-12-27 DIAGNOSIS — Z34.92 NORMAL PREGNANCY IN SECOND TRIMESTER: ICD-10-CM

## 2022-12-27 DIAGNOSIS — O99.340 DEPRESSION AFFECTING PREGNANCY, ANTEPARTUM: ICD-10-CM

## 2022-12-27 DIAGNOSIS — F41.9 ANXIETY DURING PREGNANCY: Primary | ICD-10-CM

## 2022-12-27 DIAGNOSIS — F32.A DEPRESSION AFFECTING PREGNANCY, ANTEPARTUM: ICD-10-CM

## 2022-12-27 LAB
ERYTHROCYTE [DISTWIDTH] IN BLOOD BY AUTOMATED COUNT: 12.2 % (ref 10–15)
GLUCOSE 1H P 50 G GLC PO SERPL-MCNC: 101 MG/DL (ref 70–129)
HCT VFR BLD AUTO: 34.4 % (ref 35–47)
HGB BLD-MCNC: 12.2 G/DL (ref 11.7–15.7)
MCH RBC QN AUTO: 34.4 PG (ref 26.5–33)
MCHC RBC AUTO-ENTMCNC: 35.5 G/DL (ref 31.5–36.5)
MCV RBC AUTO: 97 FL (ref 78–100)
PLATELET # BLD AUTO: 182 10E3/UL (ref 150–450)
RBC # BLD AUTO: 3.55 10E6/UL (ref 3.8–5.2)
WBC # BLD AUTO: 8.7 10E3/UL (ref 4–11)

## 2022-12-27 PROCEDURE — 90471 IMMUNIZATION ADMIN: CPT | Performed by: OBSTETRICS & GYNECOLOGY

## 2022-12-27 PROCEDURE — 85027 COMPLETE CBC AUTOMATED: CPT | Performed by: OBSTETRICS & GYNECOLOGY

## 2022-12-27 PROCEDURE — 90715 TDAP VACCINE 7 YRS/> IM: CPT | Performed by: OBSTETRICS & GYNECOLOGY

## 2022-12-27 PROCEDURE — 86780 TREPONEMA PALLIDUM: CPT | Performed by: OBSTETRICS & GYNECOLOGY

## 2022-12-27 PROCEDURE — 82950 GLUCOSE TEST: CPT | Performed by: OBSTETRICS & GYNECOLOGY

## 2022-12-27 PROCEDURE — 36415 COLL VENOUS BLD VENIPUNCTURE: CPT | Performed by: OBSTETRICS & GYNECOLOGY

## 2022-12-27 PROCEDURE — 99207 PR PRENATAL VISIT: CPT | Performed by: OBSTETRICS & GYNECOLOGY

## 2022-12-27 NOTE — PROGRESS NOTES
No c/o's.  Anxiety and depression well controlled on Rx.  28 week labs, TDaP done.  Has MFM U/S 32w for EFW due to 1st trim COVID.   labor/Premature rupture of membranes precautions reviewed.  RTC 2 weeks.    Encounter Diagnoses   Name Primary?     Anxiety during pregnancy Yes     Depression affecting pregnancy, antepartum      COVID-19 affecting pregnancy in first trimester      Normal pregnancy in second trimester        Risk factors listed above are stable and being addressed as noted.    Darren Quintanilla MD  [facility] [place]

## 2022-12-27 NOTE — NURSING NOTE
"Chief Complaint   Patient presents with     Prenatal Care     28 weeks 1 day- no concerns        Initial /64 (BP Location: Right arm, Patient Position: Sitting, Cuff Size: Adult Regular)   Wt 82.6 kg (182 lb)   LMP 2022   BMI 27.67 kg/m   Estimated body mass index is 27.67 kg/m  as calculated from the following:    Height as of 22: 1.727 m (5' 8\").    Weight as of this encounter: 82.6 kg (182 lb).  BP completed using cuff size: regular    Questioned patient about current smoking habits.  Pt. has never smoked.          The following HM Due: NONE    28w1d  Cecilio Hernandez CMA              "

## 2022-12-28 LAB — T PALLIDUM AB SER QL: NONREACTIVE

## 2023-01-10 ENCOUNTER — PRENATAL OFFICE VISIT (OUTPATIENT)
Dept: OBGYN | Facility: CLINIC | Age: 33
End: 2023-01-10
Payer: COMMERCIAL

## 2023-01-10 VITALS — WEIGHT: 185 LBS | DIASTOLIC BLOOD PRESSURE: 62 MMHG | BODY MASS INDEX: 28.13 KG/M2 | SYSTOLIC BLOOD PRESSURE: 100 MMHG

## 2023-01-10 DIAGNOSIS — O09.899 SUPERVISION OF OTHER HIGH RISK PREGNANCY, ANTEPARTUM: Primary | ICD-10-CM

## 2023-01-10 PROCEDURE — 99207 PR PRENATAL VISIT: CPT | Performed by: OBSTETRICS & GYNECOLOGY

## 2023-01-10 NOTE — NURSING NOTE
"Chief Complaint   Patient presents with     Prenatal Care            Initial /62   Wt 83.9 kg (185 lb)   LMP 2022   BMI 28.13 kg/m   Estimated body mass index is 28.13 kg/m  as calculated from the following:    Height as of 22: 1.727 m (5' 8\").    Weight as of this encounter: 83.9 kg (185 lb).  BP completed using cuff size: regular    Questioned patient about current smoking habits.  Pt. has never smoked.          The following HM Due: NONE      The following patient reported/Care Every where data was sent to:  P ABSTRACT QUALITY INITIATIVES [[id number]]        Christin Castillo [facility]                "

## 2023-01-10 NOTE — PATIENT INSTRUCTIONS
You can reach your [place] Care Team any time of the day by calling [phone]. This number will put you in touch with the 24 hour nurse line if the clinic is closed.    To contact your OB/GYN Station Coordinator/Surgery Scheduler please call [phone]. This is a direct number for your care team between 8 a.m. and 4 p.m. Monday through Friday.    [facility] is open for your convenience:  Monday through Friday 8 a.m. to 6 p.m.  Closed weekends and all major holidays.

## 2023-01-12 PROBLEM — O09.899 SUPERVISION OF OTHER HIGH RISK PREGNANCY, ANTEPARTUM: Status: ACTIVE | Noted: 2023-01-12

## 2023-01-12 NOTE — PROGRESS NOTES
Kaci Davis is a 32 year old female, 30w3d, Estimated Date of Delivery: Mar 20, 2023 who presents for prenatal care.    Good fetal movement.  Patient is doing well without concerns.  Her anxiety and depressive symptoms are in good control    A/P: Intrauterine pregnancy 30 weeks gestation doing well.  Signs and symptoms of concern discussed the patient will call.  Signs of labor and  labor discussed

## 2023-01-25 ENCOUNTER — PRENATAL OFFICE VISIT (OUTPATIENT)
Dept: OBGYN | Facility: CLINIC | Age: 33
End: 2023-01-25
Payer: COMMERCIAL

## 2023-01-25 ENCOUNTER — HOSPITAL ENCOUNTER (OUTPATIENT)
Dept: ULTRASOUND IMAGING | Facility: CLINIC | Age: 33
Discharge: HOME | End: 2023-01-25
Attending: OBSTETRICS & GYNECOLOGY
Payer: COMMERCIAL

## 2023-01-25 ENCOUNTER — OFFICE VISIT (OUTPATIENT)
Dept: MATERNAL FETAL MEDICINE | Facility: CLINIC | Age: 33
End: 2023-01-25
Attending: OBSTETRICS & GYNECOLOGY
Payer: COMMERCIAL

## 2023-01-25 VITALS — DIASTOLIC BLOOD PRESSURE: 64 MMHG | SYSTOLIC BLOOD PRESSURE: 100 MMHG | WEIGHT: 185 LBS | BODY MASS INDEX: 28.13 KG/M2

## 2023-01-25 DIAGNOSIS — U07.1 COVID-19 AFFECTING PREGNANCY IN FIRST TRIMESTER: ICD-10-CM

## 2023-01-25 DIAGNOSIS — O99.340 ANXIETY DURING PREGNANCY: ICD-10-CM

## 2023-01-25 DIAGNOSIS — F32.A DEPRESSION AFFECTING PREGNANCY, ANTEPARTUM: Primary | ICD-10-CM

## 2023-01-25 DIAGNOSIS — O99.340 DEPRESSION AFFECTING PREGNANCY, ANTEPARTUM: Primary | ICD-10-CM

## 2023-01-25 DIAGNOSIS — O98.511 COVID-19 AFFECTING PREGNANCY IN FIRST TRIMESTER: ICD-10-CM

## 2023-01-25 DIAGNOSIS — F41.9 ANXIETY DURING PREGNANCY: ICD-10-CM

## 2023-01-25 DIAGNOSIS — O40.3XX0 POLYHYDRAMNIOS IN THIRD TRIMESTER COMPLICATION, SINGLE OR UNSPECIFIED FETUS: Primary | ICD-10-CM

## 2023-01-25 PROCEDURE — 76816 OB US FOLLOW-UP PER FETUS: CPT

## 2023-01-25 PROCEDURE — 76816 OB US FOLLOW-UP PER FETUS: CPT | Mod: 26 | Performed by: OBSTETRICS & GYNECOLOGY

## 2023-01-25 PROCEDURE — 99207 PR COMPLICATED OB VISIT: CPT | Performed by: OBSTETRICS & GYNECOLOGY

## 2023-01-25 NOTE — PROGRESS NOTES
No c/o's.  Depression stable on Prozac and Lamictal. MFM U/S today for EFW.   Fetal movement counts BID,  labor/premature rupture of membranes precautions reviewed.  RTC 2 week(s).    Encounter Diagnoses   Name Primary?     Depression affecting pregnancy, antepartum Yes     COVID-19 affecting pregnancy in first trimester      Anxiety during pregnancy        Risk factors listed above are stable and being addressed as noted.    Darren Quintanilla MD  [facility] [place]

## 2023-01-25 NOTE — PROGRESS NOTES
"Please see \"Imaging\" tab under \"Chart Review\" for details of today's US.    Shivani Benavidez, DO    "

## 2023-02-09 ENCOUNTER — OFFICE VISIT (OUTPATIENT)
Dept: MATERNAL FETAL MEDICINE | Facility: CLINIC | Age: 33
End: 2023-02-09
Attending: OBSTETRICS & GYNECOLOGY
Payer: COMMERCIAL

## 2023-02-09 ENCOUNTER — HOSPITAL ENCOUNTER (OUTPATIENT)
Dept: ULTRASOUND IMAGING | Facility: CLINIC | Age: 33
Discharge: HOME | End: 2023-02-09
Attending: OBSTETRICS & GYNECOLOGY
Payer: COMMERCIAL

## 2023-02-09 ENCOUNTER — PRENATAL OFFICE VISIT (OUTPATIENT)
Dept: OBGYN | Facility: CLINIC | Age: 33
End: 2023-02-09
Payer: COMMERCIAL

## 2023-02-09 VITALS
SYSTOLIC BLOOD PRESSURE: 98 MMHG | HEIGHT: 68 IN | WEIGHT: 184 LBS | DIASTOLIC BLOOD PRESSURE: 60 MMHG | BODY MASS INDEX: 27.89 KG/M2

## 2023-02-09 DIAGNOSIS — O40.3XX0 POLYHYDRAMNIOS, THIRD TRIMESTER, NOT APPLICABLE OR UNSPECIFIED: Primary | ICD-10-CM

## 2023-02-09 DIAGNOSIS — O09.899 SUPERVISION OF OTHER HIGH RISK PREGNANCY, ANTEPARTUM: Primary | ICD-10-CM

## 2023-02-09 DIAGNOSIS — Z23 ENCOUNTER FOR IMMUNIZATION: ICD-10-CM

## 2023-02-09 PROCEDURE — 0124A COVID-19 VACCINE BIVALENT BOOSTER 12+ (PFIZER): CPT | Performed by: OBSTETRICS & GYNECOLOGY

## 2023-02-09 PROCEDURE — 91312 COVID-19 VACCINE BIVALENT BOOSTER 12+ (PFIZER): CPT | Performed by: OBSTETRICS & GYNECOLOGY

## 2023-02-09 PROCEDURE — 99207 PR PRENATAL VISIT: CPT | Performed by: OBSTETRICS & GYNECOLOGY

## 2023-02-09 PROCEDURE — 76815 OB US LIMITED FETUS(S): CPT

## 2023-02-09 PROCEDURE — 76815 OB US LIMITED FETUS(S): CPT | Mod: 26 | Performed by: OBSTETRICS & GYNECOLOGY

## 2023-02-09 NOTE — NURSING NOTE
"Chief Complaint   Patient presents with     Prenatal Care     34 3/7 weeks       Initial BP 98/60 (BP Location: Right arm, Patient Position: Chair, Cuff Size: Adult Large)   Ht 1.727 m (5' 8\")   Wt 83.5 kg (184 lb)   LMP 2022   Breastfeeding No   BMI 27.98 kg/m   Estimated body mass index is 27.98 kg/m  as calculated from the following:    Height as of this encounter: 1.727 m (5' 8\").    Weight as of this encounter: 83.5 kg (184 lb).  BP completed using cuff size: large    Questioned patient about current smoking habits.  Pt. has never smoked.          The following HM Due: NONE  +fetal movement  -swelling    Savannah Peoples, CMA    "

## 2023-02-09 NOTE — NURSING NOTE
Patient presents to M for Limited US. Positive fetal movement. Denies LOF, vaginal bleeding or cramping/contractions. SBAR given to DANIEL RASMUSSEN, see their note in Epic.

## 2023-02-09 NOTE — PROGRESS NOTES
Please see the imaging tab for details of the ultrasound performed today.    Doris Ramos MD  Specialist in Maternal-Fetal Medicine

## 2023-02-09 NOTE — PROGRESS NOTES
Routine obstetric visit at 34w3d  Good fetal movement. No concerns.  Follow up in 2 weeks, Group B Strep at that time.    Ashley Mai MD  [facility] Obstetrics and Gynecology

## 2023-02-24 ENCOUNTER — HOSPITAL ENCOUNTER (OUTPATIENT)
Dept: ULTRASOUND IMAGING | Facility: CLINIC | Age: 33
Discharge: HOME | End: 2023-02-24
Attending: OBSTETRICS & GYNECOLOGY
Payer: COMMERCIAL

## 2023-02-24 ENCOUNTER — PRENATAL OFFICE VISIT (OUTPATIENT)
Dept: OBGYN | Facility: CLINIC | Age: 33
End: 2023-02-24
Payer: COMMERCIAL

## 2023-02-24 ENCOUNTER — OFFICE VISIT (OUTPATIENT)
Dept: MATERNAL FETAL MEDICINE | Facility: CLINIC | Age: 33
End: 2023-02-24
Attending: OBSTETRICS & GYNECOLOGY
Payer: COMMERCIAL

## 2023-02-24 VITALS — DIASTOLIC BLOOD PRESSURE: 60 MMHG | WEIGHT: 186 LBS | SYSTOLIC BLOOD PRESSURE: 96 MMHG | BODY MASS INDEX: 28.28 KG/M2

## 2023-02-24 DIAGNOSIS — U07.1 COVID-19 AFFECTING PREGNANCY IN FIRST TRIMESTER: ICD-10-CM

## 2023-02-24 DIAGNOSIS — F41.9 ANXIETY DURING PREGNANCY: ICD-10-CM

## 2023-02-24 DIAGNOSIS — O40.3XX0 POLYHYDRAMNIOS IN THIRD TRIMESTER COMPLICATION, SINGLE OR UNSPECIFIED FETUS: ICD-10-CM

## 2023-02-24 DIAGNOSIS — O98.511 COVID-19 AFFECTING PREGNANCY IN FIRST TRIMESTER: ICD-10-CM

## 2023-02-24 DIAGNOSIS — O36.63X0 EXCESSIVE FETAL GROWTH AFFECTING MANAGEMENT OF PREGNANCY IN THIRD TRIMESTER, SINGLE OR UNSPECIFIED FETUS: Primary | ICD-10-CM

## 2023-02-24 DIAGNOSIS — O40.3XX0 POLYHYDRAMNIOS, THIRD TRIMESTER, NOT APPLICABLE OR UNSPECIFIED: Primary | ICD-10-CM

## 2023-02-24 DIAGNOSIS — O99.340 ANXIETY DURING PREGNANCY: ICD-10-CM

## 2023-02-24 DIAGNOSIS — O99.340 DEPRESSION AFFECTING PREGNANCY, ANTEPARTUM: ICD-10-CM

## 2023-02-24 DIAGNOSIS — F32.A DEPRESSION AFFECTING PREGNANCY, ANTEPARTUM: ICD-10-CM

## 2023-02-24 PROCEDURE — 87653 STREP B DNA AMP PROBE: CPT | Performed by: OBSTETRICS & GYNECOLOGY

## 2023-02-24 PROCEDURE — 99207 PR PRENATAL VISIT: CPT | Performed by: OBSTETRICS & GYNECOLOGY

## 2023-02-24 PROCEDURE — 76816 OB US FOLLOW-UP PER FETUS: CPT

## 2023-02-24 PROCEDURE — 76816 OB US FOLLOW-UP PER FETUS: CPT | Mod: 26 | Performed by: OBSTETRICS & GYNECOLOGY

## 2023-02-24 NOTE — NURSING NOTE
"Chief Complaint   Patient presents with     Prenatal Care     36w 4d GBS today, no concerns       Initial BP 96/60   Wt 84.4 kg (186 lb)   LMP 2022   BMI 28.28 kg/m   Estimated body mass index is 28.28 kg/m  as calculated from the following:    Height as of 23: 1.727 m (5' 8\").    Weight as of this encounter: 84.4 kg (186 lb).  BP completed using cuff size: regular    Questioned patient about current smoking habits.  Pt. has never smoked.          "

## 2023-02-24 NOTE — PROGRESS NOTES
No c/o's.  GBS done.  Cx-50/-2/Vtx.  MFM U/S today showed 90% (AC>99%) c/w LGA, also MELYSSA 28 c/w mild Poly.  [facility] recommended delivery at 39 weeks if Poly persists, and has a f/u U/S w/ them in 2 weeks.  Fetal movement counts BID,  labor/premature rupture of membranes precautions reviewed.  RTC 1 week(s).    Encounter Diagnoses   Name Primary?     Excessive fetal growth affecting management of pregnancy in third trimester, single or unspecified fetus Yes     Polyhydramnios in third trimester complication, single or unspecified fetus      Depression affecting pregnancy, antepartum      Anxiety during pregnancy      COVID-19 affecting pregnancy in first trimester        Risk factors listed above are stable and being addressed as noted.    Darren Quintanilla MD  [facility] [place]

## 2023-02-25 LAB — GP B STREP DNA SPEC QL NAA+PROBE: NEGATIVE

## 2023-03-01 ENCOUNTER — PRENATAL OFFICE VISIT (OUTPATIENT)
Dept: OBGYN | Facility: CLINIC | Age: 33
End: 2023-03-01
Payer: COMMERCIAL

## 2023-03-01 VITALS — WEIGHT: 186 LBS | DIASTOLIC BLOOD PRESSURE: 72 MMHG | BODY MASS INDEX: 28.28 KG/M2 | SYSTOLIC BLOOD PRESSURE: 104 MMHG

## 2023-03-01 DIAGNOSIS — O36.63X0 EXCESSIVE FETAL GROWTH AFFECTING MANAGEMENT OF PREGNANCY IN THIRD TRIMESTER, SINGLE OR UNSPECIFIED FETUS: Primary | ICD-10-CM

## 2023-03-01 DIAGNOSIS — O40.3XX0 POLYHYDRAMNIOS IN THIRD TRIMESTER COMPLICATION, SINGLE OR UNSPECIFIED FETUS: ICD-10-CM

## 2023-03-01 PROCEDURE — 99207 PR PRENATAL VISIT: CPT | Performed by: OBSTETRICS & GYNECOLOGY

## 2023-03-01 NOTE — NURSING NOTE
"Chief Complaint   Patient presents with     Prenatal Care   37w2d    initial /72   Wt 84.4 kg (186 lb)   LMP 06/06/2022   BMI 28.28 kg/m   Estimated body mass index is 28.28 kg/m  as calculated from the following:    Height as of 2/9/23: 1.727 m (5' 8\").    Weight as of this encounter: 84.4 kg (186 lb).  BP completed using cuff size regular.  Miri Rodriguez CMA    "

## 2023-03-01 NOTE — PROGRESS NOTES
33yo  at 37w2d.  GBS neg.  U/S in MFM  with EFW 90%tile and mild poly.  Has F/U on 3/13.  They had discussed possible induction at 39 wks.  Discussed pros/cons based on cervical favorability.  RTC 1 week

## 2023-03-07 ENCOUNTER — PRENATAL OFFICE VISIT (OUTPATIENT)
Dept: OBGYN | Facility: CLINIC | Age: 33
End: 2023-03-07
Payer: COMMERCIAL

## 2023-03-07 VITALS — BODY MASS INDEX: 28.43 KG/M2 | DIASTOLIC BLOOD PRESSURE: 64 MMHG | WEIGHT: 187 LBS | SYSTOLIC BLOOD PRESSURE: 108 MMHG

## 2023-03-07 DIAGNOSIS — O09.899 SUPERVISION OF OTHER HIGH RISK PREGNANCY, ANTEPARTUM: Primary | ICD-10-CM

## 2023-03-07 PROCEDURE — 99207 PR PRENATAL VISIT: CPT | Performed by: OBSTETRICS & GYNECOLOGY

## 2023-03-07 PROCEDURE — 59426 ANTEPARTUM CARE ONLY: CPT | Performed by: OBSTETRICS & GYNECOLOGY

## 2023-03-07 NOTE — PROGRESS NOTES
Routine obstetric visit at 38w1d  A few contractions.   Signs and symptoms of labor reviewed, including when to call or come in for evaluation.  Is not interested in scheduling induction of labor at this point, will follow up after US next week.    Ashley Mai MD  [facility] Obstetrics and Gynecology      
18

## 2023-03-07 NOTE — NURSING NOTE
"Chief Complaint   Patient presents with     Prenatal Care       Initial /64   Wt 84.8 kg (187 lb)   LMP 2022   BMI 28.43 kg/m   Estimated body mass index is 28.43 kg/m  as calculated from the following:    Height as of 23: 1.727 m (5' 8\").    Weight as of this encounter: 84.8 kg (187 lb).  BP completed using cuff size: regular    Questioned patient about current smoking habits.  Pt. has never smoked.          38w1d  + FM daily  - headache  - edema  + hannah ortega contractions  - bleeding or LOF  Venita Rowe LPN               "

## 2023-03-11 ENCOUNTER — ANESTHESIA (OUTPATIENT)
Dept: OBGYN | Facility: CLINIC | Age: 33
End: 2023-03-11
Payer: COMMERCIAL

## 2023-03-11 ENCOUNTER — NURSE TRIAGE (OUTPATIENT)
Dept: NURSING | Facility: CLINIC | Age: 33
End: 2023-03-11
Payer: COMMERCIAL

## 2023-03-11 ENCOUNTER — ANESTHESIA EVENT (OUTPATIENT)
Dept: OBGYN | Facility: CLINIC | Age: 33
End: 2023-03-11
Payer: COMMERCIAL

## 2023-03-11 ENCOUNTER — HOSPITAL ENCOUNTER (INPATIENT)
Facility: CLINIC | Age: 33
LOS: 2 days | Discharge: HOME | End: 2023-03-13
Attending: FAMILY MEDICINE | Admitting: FAMILY MEDICINE
Payer: COMMERCIAL

## 2023-03-11 DIAGNOSIS — O09.899 SUPERVISION OF OTHER HIGH RISK PREGNANCY, ANTEPARTUM: ICD-10-CM

## 2023-03-11 LAB
ABO/RH(D): NORMAL
ANTIBODY SCREEN: NEGATIVE
HGB BLD-MCNC: 13.6 G/DL (ref 11.7–15.7)
SPECIMEN EXPIRATION DATE: NORMAL

## 2023-03-11 PROCEDURE — 86901 BLOOD TYPING SEROLOGIC RH(D): CPT | Performed by: FAMILY MEDICINE

## 2023-03-11 PROCEDURE — 85018 HEMOGLOBIN: CPT | Performed by: FAMILY MEDICINE

## 2023-03-11 PROCEDURE — 120N000001 HC R&B MED SURG/OB

## 2023-03-11 PROCEDURE — 250N000009 HC RX 250: Performed by: FAMILY MEDICINE

## 2023-03-11 PROCEDURE — 250N000011 HC RX IP 250 OP 636: Performed by: FAMILY MEDICINE

## 2023-03-11 PROCEDURE — 370N000003 HC ANESTHESIA WARD SERVICE

## 2023-03-11 PROCEDURE — 250N000013 HC RX MED GY IP 250 OP 250 PS 637: Performed by: FAMILY MEDICINE

## 2023-03-11 PROCEDURE — 250N000011 HC RX IP 250 OP 636: Performed by: ANESTHESIOLOGY

## 2023-03-11 PROCEDURE — 258N000003 HC RX IP 258 OP 636: Performed by: FAMILY MEDICINE

## 2023-03-11 PROCEDURE — 59410 OBSTETRICAL CARE: CPT | Mod: 22 | Performed by: FAMILY MEDICINE

## 2023-03-11 PROCEDURE — 3E0R3BZ INTRODUCTION OF ANESTHETIC AGENT INTO SPINAL CANAL, PERCUTANEOUS APPROACH: ICD-10-PCS | Performed by: ANESTHESIOLOGY

## 2023-03-11 PROCEDURE — 0DQR0ZZ REPAIR ANAL SPHINCTER, OPEN APPROACH: ICD-10-PCS | Performed by: PSYCHIATRY & NEUROLOGY

## 2023-03-11 PROCEDURE — 00HU33Z INSERTION OF INFUSION DEVICE INTO SPINAL CANAL, PERCUTANEOUS APPROACH: ICD-10-PCS | Performed by: ANESTHESIOLOGY

## 2023-03-11 PROCEDURE — 86780 TREPONEMA PALLIDUM: CPT | Performed by: FAMILY MEDICINE

## 2023-03-11 PROCEDURE — 722N000001 HC LABOR CARE VAGINAL DELIVERY SINGLE

## 2023-03-11 PROCEDURE — G0463 HOSPITAL OUTPT CLINIC VISIT: HCPCS

## 2023-03-11 RX ORDER — OXYTOCIN/0.9 % SODIUM CHLORIDE 30/500 ML
100-340 PLASTIC BAG, INJECTION (ML) INTRAVENOUS CONTINUOUS PRN
Status: DISCONTINUED | OUTPATIENT
Start: 2023-03-11 | End: 2023-03-11

## 2023-03-11 RX ORDER — METHYLERGONOVINE MALEATE 0.2 MG/ML
200 INJECTION INTRAVENOUS
Status: DISCONTINUED | OUTPATIENT
Start: 2023-03-11 | End: 2023-03-11 | Stop reason: HOSPADM

## 2023-03-11 RX ORDER — OXYTOCIN/0.9 % SODIUM CHLORIDE 30/500 ML
340 PLASTIC BAG, INJECTION (ML) INTRAVENOUS CONTINUOUS PRN
Status: DISCONTINUED | OUTPATIENT
Start: 2023-03-11 | End: 2023-03-11 | Stop reason: HOSPADM

## 2023-03-11 RX ORDER — PROCHLORPERAZINE 25 MG
25 SUPPOSITORY, RECTAL RECTAL EVERY 12 HOURS PRN
Status: DISCONTINUED | OUTPATIENT
Start: 2023-03-11 | End: 2023-03-11 | Stop reason: HOSPADM

## 2023-03-11 RX ORDER — IBUPROFEN 800 MG/1
800 TABLET, FILM COATED ORAL
Status: DISCONTINUED | OUTPATIENT
Start: 2023-03-11 | End: 2023-03-11

## 2023-03-11 RX ORDER — MISOPROSTOL 200 UG/1
800 TABLET ORAL
Status: DISCONTINUED | OUTPATIENT
Start: 2023-03-11 | End: 2023-03-13 | Stop reason: HOSPADM

## 2023-03-11 RX ORDER — CARBOPROST TROMETHAMINE 250 UG/ML
250 INJECTION, SOLUTION INTRAMUSCULAR
Status: DISCONTINUED | OUTPATIENT
Start: 2023-03-11 | End: 2023-03-13 | Stop reason: HOSPADM

## 2023-03-11 RX ORDER — OXYTOCIN 10 [USP'U]/ML
10 INJECTION, SOLUTION INTRAMUSCULAR; INTRAVENOUS
Status: DISCONTINUED | OUTPATIENT
Start: 2023-03-11 | End: 2023-03-11 | Stop reason: HOSPADM

## 2023-03-11 RX ORDER — CARBOPROST TROMETHAMINE 250 UG/ML
250 INJECTION, SOLUTION INTRAMUSCULAR
Status: DISCONTINUED | OUTPATIENT
Start: 2023-03-11 | End: 2023-03-11 | Stop reason: HOSPADM

## 2023-03-11 RX ORDER — KETOROLAC TROMETHAMINE 30 MG/ML
30 INJECTION, SOLUTION INTRAMUSCULAR; INTRAVENOUS
Status: DISCONTINUED | OUTPATIENT
Start: 2023-03-11 | End: 2023-03-11

## 2023-03-11 RX ORDER — CITRIC ACID/SODIUM CITRATE 334-500MG
30 SOLUTION, ORAL ORAL
Status: DISCONTINUED | OUTPATIENT
Start: 2023-03-11 | End: 2023-03-11 | Stop reason: HOSPADM

## 2023-03-11 RX ORDER — NALOXONE HYDROCHLORIDE 0.4 MG/ML
0.4 INJECTION, SOLUTION INTRAMUSCULAR; INTRAVENOUS; SUBCUTANEOUS
Status: DISCONTINUED | OUTPATIENT
Start: 2023-03-11 | End: 2023-03-11 | Stop reason: HOSPADM

## 2023-03-11 RX ORDER — MISOPROSTOL 200 UG/1
800 TABLET ORAL
Status: DISCONTINUED | OUTPATIENT
Start: 2023-03-11 | End: 2023-03-11 | Stop reason: HOSPADM

## 2023-03-11 RX ORDER — LAMOTRIGINE 100 MG/1
100 TABLET ORAL DAILY
Status: DISCONTINUED | OUTPATIENT
Start: 2023-03-11 | End: 2023-03-13 | Stop reason: HOSPADM

## 2023-03-11 RX ORDER — MODIFIED LANOLIN
OINTMENT (GRAM) TOPICAL
Status: DISCONTINUED | OUTPATIENT
Start: 2023-03-11 | End: 2023-03-13 | Stop reason: HOSPADM

## 2023-03-11 RX ORDER — MISOPROSTOL 200 UG/1
400 TABLET ORAL
Status: DISCONTINUED | OUTPATIENT
Start: 2023-03-11 | End: 2023-03-11 | Stop reason: HOSPADM

## 2023-03-11 RX ORDER — FENTANYL CITRATE 50 UG/ML
100 INJECTION, SOLUTION INTRAMUSCULAR; INTRAVENOUS
Status: DISCONTINUED | OUTPATIENT
Start: 2023-03-11 | End: 2023-03-11 | Stop reason: HOSPADM

## 2023-03-11 RX ORDER — TRANEXAMIC ACID 10 MG/ML
1 INJECTION, SOLUTION INTRAVENOUS EVERY 30 MIN PRN
Status: DISCONTINUED | OUTPATIENT
Start: 2023-03-11 | End: 2023-03-13 | Stop reason: HOSPADM

## 2023-03-11 RX ORDER — IBUPROFEN 800 MG/1
800 TABLET, FILM COATED ORAL EVERY 6 HOURS PRN
Status: DISCONTINUED | OUTPATIENT
Start: 2023-03-11 | End: 2023-03-13 | Stop reason: HOSPADM

## 2023-03-11 RX ORDER — OXYTOCIN/0.9 % SODIUM CHLORIDE 30/500 ML
1-24 PLASTIC BAG, INJECTION (ML) INTRAVENOUS CONTINUOUS
Status: DISCONTINUED | OUTPATIENT
Start: 2023-03-11 | End: 2023-03-11 | Stop reason: HOSPADM

## 2023-03-11 RX ORDER — METHYLERGONOVINE MALEATE 0.2 MG/ML
200 INJECTION INTRAVENOUS
Status: DISCONTINUED | OUTPATIENT
Start: 2023-03-11 | End: 2023-03-13 | Stop reason: HOSPADM

## 2023-03-11 RX ORDER — METOCLOPRAMIDE 10 MG/1
10 TABLET ORAL EVERY 6 HOURS PRN
Status: DISCONTINUED | OUTPATIENT
Start: 2023-03-11 | End: 2023-03-11 | Stop reason: HOSPADM

## 2023-03-11 RX ORDER — OXYTOCIN 10 [USP'U]/ML
10 INJECTION, SOLUTION INTRAMUSCULAR; INTRAVENOUS
Status: DISCONTINUED | OUTPATIENT
Start: 2023-03-11 | End: 2023-03-11

## 2023-03-11 RX ORDER — METOCLOPRAMIDE HYDROCHLORIDE 5 MG/ML
10 INJECTION INTRAMUSCULAR; INTRAVENOUS EVERY 6 HOURS PRN
Status: DISCONTINUED | OUTPATIENT
Start: 2023-03-11 | End: 2023-03-11 | Stop reason: HOSPADM

## 2023-03-11 RX ORDER — NALBUPHINE HYDROCHLORIDE 10 MG/ML
2.5-5 INJECTION, SOLUTION INTRAMUSCULAR; INTRAVENOUS; SUBCUTANEOUS EVERY 6 HOURS PRN
Status: DISCONTINUED | OUTPATIENT
Start: 2023-03-11 | End: 2023-03-11

## 2023-03-11 RX ORDER — FENTANYL CITRATE-0.9 % NACL/PF 10 MCG/ML
100 PLASTIC BAG, INJECTION (ML) INTRAVENOUS EVERY 5 MIN PRN
Status: DISCONTINUED | OUTPATIENT
Start: 2023-03-11 | End: 2023-03-11 | Stop reason: HOSPADM

## 2023-03-11 RX ORDER — OXYTOCIN/0.9 % SODIUM CHLORIDE 30/500 ML
340 PLASTIC BAG, INJECTION (ML) INTRAVENOUS CONTINUOUS PRN
Status: DISCONTINUED | OUTPATIENT
Start: 2023-03-11 | End: 2023-03-13 | Stop reason: HOSPADM

## 2023-03-11 RX ORDER — HYDROCORTISONE 25 MG/G
CREAM TOPICAL 3 TIMES DAILY PRN
Status: DISCONTINUED | OUTPATIENT
Start: 2023-03-11 | End: 2023-03-13 | Stop reason: HOSPADM

## 2023-03-11 RX ORDER — NALOXONE HYDROCHLORIDE 0.4 MG/ML
0.2 INJECTION, SOLUTION INTRAMUSCULAR; INTRAVENOUS; SUBCUTANEOUS
Status: DISCONTINUED | OUTPATIENT
Start: 2023-03-11 | End: 2023-03-11 | Stop reason: HOSPADM

## 2023-03-11 RX ORDER — ACETAMINOPHEN 325 MG/1
650 TABLET ORAL EVERY 4 HOURS PRN
Status: DISCONTINUED | OUTPATIENT
Start: 2023-03-11 | End: 2023-03-11 | Stop reason: HOSPADM

## 2023-03-11 RX ORDER — DOCUSATE SODIUM 100 MG/1
100 CAPSULE, LIQUID FILLED ORAL 2 TIMES DAILY
Status: DISCONTINUED | OUTPATIENT
Start: 2023-03-11 | End: 2023-03-13 | Stop reason: HOSPADM

## 2023-03-11 RX ORDER — PROCHLORPERAZINE MALEATE 10 MG
10 TABLET ORAL EVERY 6 HOURS PRN
Status: DISCONTINUED | OUTPATIENT
Start: 2023-03-11 | End: 2023-03-11 | Stop reason: HOSPADM

## 2023-03-11 RX ORDER — TRANEXAMIC ACID 10 MG/ML
1 INJECTION, SOLUTION INTRAVENOUS EVERY 30 MIN PRN
Status: DISCONTINUED | OUTPATIENT
Start: 2023-03-11 | End: 2023-03-11 | Stop reason: HOSPADM

## 2023-03-11 RX ORDER — OXYTOCIN 10 [USP'U]/ML
10 INJECTION, SOLUTION INTRAMUSCULAR; INTRAVENOUS
Status: DISCONTINUED | OUTPATIENT
Start: 2023-03-11 | End: 2023-03-13 | Stop reason: HOSPADM

## 2023-03-11 RX ORDER — ACETAMINOPHEN 325 MG/1
650 TABLET ORAL EVERY 4 HOURS PRN
Status: DISCONTINUED | OUTPATIENT
Start: 2023-03-11 | End: 2023-03-13 | Stop reason: HOSPADM

## 2023-03-11 RX ORDER — ONDANSETRON 2 MG/ML
4 INJECTION INTRAMUSCULAR; INTRAVENOUS EVERY 6 HOURS PRN
Status: DISCONTINUED | OUTPATIENT
Start: 2023-03-11 | End: 2023-03-11 | Stop reason: HOSPADM

## 2023-03-11 RX ORDER — SODIUM CHLORIDE, SODIUM LACTATE, POTASSIUM CHLORIDE, CALCIUM CHLORIDE 600; 310; 30; 20 MG/100ML; MG/100ML; MG/100ML; MG/100ML
INJECTION, SOLUTION INTRAVENOUS CONTINUOUS
Status: DISCONTINUED | OUTPATIENT
Start: 2023-03-11 | End: 2023-03-11 | Stop reason: HOSPADM

## 2023-03-11 RX ORDER — BUPIVACAINE HYDROCHLORIDE 2.5 MG/ML
INJECTION, SOLUTION EPIDURAL; INFILTRATION; INTRACAUDAL PRN
Status: DISCONTINUED | OUTPATIENT
Start: 2023-03-11 | End: 2023-03-11

## 2023-03-11 RX ORDER — LIDOCAINE 40 MG/G
CREAM TOPICAL
Status: DISCONTINUED | OUTPATIENT
Start: 2023-03-11 | End: 2023-03-11 | Stop reason: HOSPADM

## 2023-03-11 RX ORDER — MISOPROSTOL 200 UG/1
400 TABLET ORAL
Status: DISCONTINUED | OUTPATIENT
Start: 2023-03-11 | End: 2023-03-13 | Stop reason: HOSPADM

## 2023-03-11 RX ORDER — ONDANSETRON 4 MG/1
4 TABLET, ORALLY DISINTEGRATING ORAL EVERY 6 HOURS PRN
Status: DISCONTINUED | OUTPATIENT
Start: 2023-03-11 | End: 2023-03-11 | Stop reason: HOSPADM

## 2023-03-11 RX ORDER — SODIUM CHLORIDE, SODIUM LACTATE, POTASSIUM CHLORIDE, CALCIUM CHLORIDE 600; 310; 30; 20 MG/100ML; MG/100ML; MG/100ML; MG/100ML
INJECTION, SOLUTION INTRAVENOUS CONTINUOUS PRN
Status: DISCONTINUED | OUTPATIENT
Start: 2023-03-11 | End: 2023-03-11 | Stop reason: HOSPADM

## 2023-03-11 RX ADMIN — FENTANYL CITRATE 100 MCG: 50 INJECTION, SOLUTION INTRAMUSCULAR; INTRAVENOUS at 14:07

## 2023-03-11 RX ADMIN — DOCUSATE SODIUM 100 MG: 100 CAPSULE, LIQUID FILLED ORAL at 20:47

## 2023-03-11 RX ADMIN — Medication: at 14:42

## 2023-03-11 RX ADMIN — FLUOXETINE 40 MG: 20 CAPSULE ORAL at 20:47

## 2023-03-11 RX ADMIN — LAMOTRIGINE 100 MG: 100 TABLET ORAL at 20:47

## 2023-03-11 RX ADMIN — BUPIVACAINE HYDROCHLORIDE 10 ML: 2.5 INJECTION, SOLUTION EPIDURAL; INFILTRATION; INTRACAUDAL at 14:29

## 2023-03-11 RX ADMIN — IBUPROFEN 800 MG: 800 TABLET, FILM COATED ORAL at 17:29

## 2023-03-11 RX ADMIN — SODIUM CHLORIDE, POTASSIUM CHLORIDE, SODIUM LACTATE AND CALCIUM CHLORIDE: 600; 310; 30; 20 INJECTION, SOLUTION INTRAVENOUS at 11:23

## 2023-03-11 RX ADMIN — Medication 2 MILLI-UNITS/MIN: at 11:29

## 2023-03-11 ASSESSMENT — ACTIVITIES OF DAILY LIVING (ADL)
TOILETING_ISSUES: NO
DRESSING/BATHING_DIFFICULTY: NO
ADLS_ACUITY_SCORE: 20
ADLS_ACUITY_SCORE: 35
ADLS_ACUITY_SCORE: 20
WEAR_GLASSES_OR_BLIND: YES
CHANGE_IN_FUNCTIONAL_STATUS_SINCE_ONSET_OF_CURRENT_ILLNESS/INJURY: NO
ADLS_ACUITY_SCORE: 20
WALKING_OR_CLIMBING_STAIRS_DIFFICULTY: NO
ADLS_ACUITY_SCORE: 20
DIFFICULTY_COMMUNICATING: NO
ADLS_ACUITY_SCORE: 20
DOING_ERRANDS_INDEPENDENTLY_DIFFICULTY: NO
ADLS_ACUITY_SCORE: 20
DIFFICULTY_EATING/SWALLOWING: NO
FALL_HISTORY_WITHIN_LAST_SIX_MONTHS: NO
VISION_MANAGEMENT: WEARS GLASSES
CONCENTRATING,_REMEMBERING_OR_MAKING_DECISIONS_DIFFICULTY: NO
HEARING_DIFFICULTY_OR_DEAF: NO

## 2023-03-11 NOTE — H&P
[facility] Labor and Delivery History and Physical    Kaci Davis MRN# [id number]   Age: 32 year old YOB: 1990     Date of Admission:  3/11/2023    Primary care provider: No Ref-Primary, Physician           Chief Complaint:   Kaci Davis is a 32 year old female who is  @ 38w5d pregnant and being admitted for SROM, GBS negative.          Pregnancy history:     OBSTETRIC HISTORY:    OB History    Para Term  AB Living   1 0 0 0 0 0   SAB IAB Ectopic Multiple Live Births   0 0 0 0 0      # Outcome Date GA Lbr Luis/2nd Weight Sex Delivery Anes PTL Lv   1 Current                EDC: Estimated Date of Delivery: Mar 20, 2023    Prenatal Labs:   Lab Results   Component Value Date    AS Negative 2022    HEPBANG Nonreactive 2022    CHPCRT Negative 2022    GCPCRT Negative 2022    HGB 12.2 2022       GBS Status:   No results found for: GBS    Active Problem List  Patient Active Problem List   Diagnosis     Anxiety and depression     Normal pregnancy in first trimester     Anxiety during pregnancy     Depression affecting pregnancy, antepartum     COVID-19 affecting pregnancy in first trimester     Supervision of other high risk pregnancy, antepartum     Excessive fetal growth affecting management of pregnancy in third trimester     Polyhydramnios in third trimester     Indication for care in labor or delivery       Medication Prior to Admission  Medications Prior to Admission   Medication Sig Dispense Refill Last Dose     FLUoxetine (PROZAC) 40 MG capsule         lamoTRIgine (LAMICTAL) 100 MG tablet         Prenatal Vit-Fe Fumarate-FA (PRENATAL PO)       .        Maternal Past Medical History:   No past medical history on file.                    Family History:   This patient has no significant family history            Social History:   This patient has no significant social history         Review of Systems:   CONSTITUTIONAL: NEGATIVE for fever,  "chills, change in weight  INTEGUMENTARY/SKIN: NEGATIVE for worrisome rashes, moles or lesions  EYES: NEGATIVE for vision changes or irritation  ENT/MOUTH: NEGATIVE for ear, mouth and throat problems  RESP: NEGATIVE for significant cough or SOB  BREAST: NEGATIVE for masses, tenderness or discharge  CV: NEGATIVE for chest pain, palpitations or peripheral edema  GI: NEGATIVE for nausea, abdominal pain, heartburn, or change in bowel habits  : NEGATIVE for frequency, dysuria, or hematuria  MUSCULOSKELETAL: NEGATIVE for significant arthralgias or myalgia  NEURO: NEGATIVE for weakness, dizziness or paresthesias  ENDOCRINE: NEGATIVE for temperature intolerance, skin/hair changes  HEME: NEGATIVE for bleeding problems  PSYCHIATRIC: NEGATIVE for changes in mood or affect          Physical Exam:     Vitals were reviewed  Patient Vitals for the past 8 hrs:   BP Temp Temp src Height Weight   03/11/23 0957 113/79 98.2  F (36.8  C) Oral 1.727 m (5' 8\") 89.4 kg (197 lb)     Constitutional: Awake, alert, cooperative, no apparent distress, and appears stated age.  Eyes: Lids and lashes normal, pupils equal, round and reactive to light, extra ocular muscles intact, sclera clear, conjunctiva normal.  ENT: Normocephalic, without obvious abnormality, atramatic, sinuses nontender on palpation, external ears without lesions, oral pharynx with moist mucus membranes, tonsils without erythema or exudates, gums normal and good dentition.  Neck: Supple, symmetrical, trachea midline, no adenopathy, thyroid symmetric, not enlarged and no tenderness, skin normal.  Hematologic / Lymphatic: No cervical lymphadenopathy and no supraclavicular lymphadenopathy.  Back: Symmetric, no curvature, spinous processes are non-tender on palpation, paraspinous muscles are non-tender on palpation, no costal vertebral tenderness.  Lungs: No increased work of breathing, good air exchange, clear to auscultation bilaterally, no crackles or wheezing.  Cardiovascular: " Regular rate and rhythm, normal S1 and S2, no S3 or S4, and no murmur noted.  Chest / Breast: Breasts symmetrical, skin without lesion(s), no nipple retraction or dimpling, no nipple discharge, no masses palpated, no axillary or supraclavicular adenopathy.  Abdomen: No scars, normal bowel sounds, soft, non-distended, non-tender, no masses palpated, no hepatosplenomegally.  Genitourinary: No urethral discharge, normal external genitalia, no hernia.  Musculoskeletal: No redness, warmth, or swelling of the joints.  Full range of motion noted.  Motor strength is 5 out of 5 all extremities bilaterally.  Tone is normal.  Neurologic: Awake, alert, oriented to name, place and time.  Cranial nerves II-XII are grossly intact.  Motor is 5 out of 5 bilaterally.  Cerebellar finger to nose, heel to shin intact.  Sensory is intact.  Babinski down going, Romberg negative, and gait is normal.  Neuropsychiatric: Normal affect, mood, orientation, memory and insight.  Skin: No rashes, erythema, pallor, petechia or purpura.   Cervix:   Membranes: SROM   Dilation: 2   Effacement: 70%   Station:-1   Consistency: soft   Position: Mid  Presentation:Cephalic  Fetal Heart Rate Tracing: Tier 1 (normal)  Tocometer: rare                       Assessment:   Kaci Davis is a 32 year old female who is  @ 38w5d pregnant and being admitted for SROM, GBS negative.    Discussed options of admission with pitocin vs misoprostol induction versus discharge home   And returning with labor symptoms.  She prefers admission with IOL and I also agree with this.     Her rodriguez score is 7 and pitocin can be started  And expected to induce labor due to   Srom.         Plan:   Admit - see IP orders  Labor induction with Pitocin  Continuous monitoring.      Jocelyn Hines DO

## 2023-03-11 NOTE — ANESTHESIA PREPROCEDURE EVALUATION
Anesthesia Pre-Procedure Evaluation    Patient: Kaci Davis   MRN: [id number] : 1990        Procedure :           No past medical history on file.   Past Surgical History:   Procedure Laterality Date     KNEE SURGERY Bilateral 2008    repair of both knees     LEG SURGERY Right 2016    r leg      Allergies   Allergen Reactions     Augmentin Hives     Amoxicillin-Pot Clavulanate Hives and Rash      Social History     Tobacco Use     Smoking status: Never     Smokeless tobacco: Never   Substance Use Topics     Alcohol use: Not on file      Wt Readings from Last 1 Encounters:   23 89.4 kg (197 lb)        Anesthesia Evaluation   Pt has had prior anesthetic.     No history of anesthetic complications       ROS/MED HX  ENT/Pulmonary:  - neg pulmonary ROS     Neurologic:  - neg neurologic ROS     Cardiovascular:  - neg cardiovascular ROS     METS/Exercise Tolerance:     Hematologic:  - neg hematologic  ROS     Musculoskeletal:  - neg musculoskeletal ROS     GI/Hepatic:  - neg GI/hepatic ROS     Renal/Genitourinary:  - neg Renal ROS     Endo:     (+) Obesity,     Psychiatric/Substance Use:  - neg psychiatric ROS     Infectious Disease:       Malignancy:       Other:            Physical Exam    Airway        Mallampati: II   TM distance: > 3 FB   Neck ROM: full   Mouth opening: > 3 cm    Respiratory Devices and Support         Dental       (+) Minor Abnormalities - some fillings, tiny chips      Cardiovascular          Rhythm and rate: regular and normal     Pulmonary           breath sounds clear to auscultation           OUTSIDE LABS:  CBC:   Lab Results   Component Value Date    WBC 8.7 2022    WBC 7.0 2022    HGB 13.6 2023    HGB 12.2 2022    HCT 34.4 (L) 2022    HCT 42.8 2022     2022     2022     BMP:   Lab Results   Component Value Date     2018     2018    POTASSIUM 4.0 2018    POTASSIUM 4.2 2018     CHLORIDE 108 09/11/2018    CHLORIDE 104 08/14/2018    CO2 25 09/11/2018    CO2 30 08/14/2018    BUN 7 09/11/2018    BUN 9 08/14/2018    CR 0.80 09/11/2018    CR 0.83 08/14/2018    GLC 80 09/11/2018    GLC 75 08/14/2018     COAGS: No results found for: PTT, INR, FIBR  POC: No results found for: BGM, HCG, HCGS  HEPATIC:   Lab Results   Component Value Date    ALBUMIN 3.9 09/11/2018    PROTTOTAL 7.8 09/11/2018    ALT 22 09/11/2018    AST 26 09/11/2018    ALKPHOS 64 09/11/2018    BILITOTAL 1.0 09/11/2018     OTHER:   Lab Results   Component Value Date    A1C 4.9 08/01/2022    KEE 8.9 09/11/2018       Anesthesia Plan    ASA Status:  2   - Procedure: Procedure only, no anesthetic delivered      Anesthesia Type: Epidural.              Consents    Anesthesia Plan(s) and associated risks, benefits, and realistic alternatives discussed. Questions answered and patient/representative(s) expressed understanding.    - Discussed:     - Discussed with:  Patient         Postoperative Care            Comments:    Other Comments: Continuous Labor Epidural: Indication is for labor pain. Following an discussion of the procedure, epidural expectations, and risks and benefits (risks including, but not limited to, nerve damage, infection, bleeding/hematoma, inadvertent dural puncture, spinal headache, partial or failed block), the patient appears to understand and consents to proceed. Questions were encouraged and answered.             Samuel Hernandez MD

## 2023-03-11 NOTE — L&D DELIVERY NOTE
OB Vaginal Delivery Note    Kaci Davis MRN# [id number]   Age: 32 year old YOB: 1990       GA: 38w5d  GP:   Labor Complications: None   EBL:   mL  Delivery QBL: 523 mL  Delivery Type: Vaginal, Spontaneous   ROM to Delivery Time: (Delivered) Hours: 8 Minutes: 11   Weight: 3.5 kg (7 lb 11.5 oz)    1 Minute 5 Minute 10 Minute   Apgar Totals: 7   8        NAS ROSENBAUM;SAEED CLINE;MARY GUEVARA     Delivery Details:  Kaci Davis, a 32 year old  female delivered a viable infant with apgars of 7  and 8 . Patient was fully dilated and pushing after 1  hours 10  minutes in active labor. Delivery was via vaginal, spontaneous  to a sterile field under epidural  anesthesia. Infant delivered in vertex  left  occiput  anterior  position. Anterior and posterior shoulders delivered without difficulty. The cord was clamped, cut twice and 3 vessels  were noted. Cord blood was obtained in routine fashion with the following disposition: lab .      Cord complications: nuchal   Placenta delivered at 3/11/2023  4:32 PM . Placental disposition was Hospital disposal . Fundal massage performed and fundus found to be firm.     Episiotomy: none    Perineum, vagina, cervix were inspected, and the following lacerations were noted:   Perineal lacerations: 3rd  3b, not completely through the rectal sphincter. When the sphincter was repaired,   Then the anus was pulled together and it was noted that repair of the anal mucosa was needed, this was repaired   With 3-0 monocryl, and this repair was about 8 mm in length.                  Any lacerations were repaired in the usual fashion using 0-vicryl and 3-0 monocryl.    Excellent hemostasis was noted. Needle count correct. Infant and patient in delivery room in good and stable condition.        Ryan, Female-Kaci [[id number]]    Labor Event Times    Labor onset date: 3/11/23 Onset time:  1:30 PM   Dilation complete date: 3/11/23 Complete time:   2:40 PM   Start pushing date/time: 3/11/2023 1458      Labor Length    1st Stage (hrs): 1 (min): 10   2nd Stage (hrs): 1 (min): 46   3rd Stage (hrs): 0 (min): 6      Labor Events     labor?: No   steroids: None  Labor Type: Augmentation  Predominate monitoring during 1st stage: continuous electronic fetal monitoring     Rupture date/time: 3/11/23 0815   Rupture type: Spontaneous rupture of membranes occuring during spontaneous labor or augmentation  Fluid color: Clear  Fluid odor: Normal     Induction: Oxytocin  Induction date/time:     Cervical ripening date/time:        Augmentation: Oxytocin  Augmentation date/time: 3/11/23 1130      Delivery/Placenta Date and Time    Delivery Date: 3/11/23 Delivery Time:  4:26 PM   Placenta Date/Time: 3/11/2023  4:32 PM  Oxytocin given at the time of delivery: after delivery of placenta  Delivering clinician: Jocelyn Hines DO   Other personnel present at delivery:  Provider Role   Mikayla Priest RN Registered Nurse   Saeed Thornton RN Registered Nurse   Valeri Mendez RN Registered Nurse         Vaginal Counts     Initial count performed by 2 team members:  Two Team Members   Kalani Priest Dr       Needles Suture Needles Sponges (RETIRED) Instruments   Initial counts 2  5    Added to count  5 5    Relief counts       Final counts 2 5 10          Placed during labor Accounted for at the end of labor   FSE NA NA   IUPC NA NA   Cervidil NA NA              Final count performed by 2 team members:  Two Team Members   Dr. Donny Thornton RN      Final count correct?: Yes     Apgars    Living status: Living   1 Minute 5 Minute 10 Minute 15 Minute 20 Minute   Skin color: 0  0       Heart rate: 2  2       Reflex irritability: 2  2       Muscle tone: 2  2       Respiratory effort: 1  2       Total: 7  8       Apgars assigned by: SAEED THORNTON RN     Cord    Vessels: 3 Vessels    Cord Complications: Nuchal   Nuchal Intervention: reduced  "        Nuchal cord description: loose nuchal cord         Cord Blood Disposition: Lab    Gases Sent?: No    Delayed cord clamping?: Yes    Cord Clamping Delay (seconds):  seconds       [place] Resuscitation    Methods: None     [place] Measurements    Weight: 7 lb 11.5 oz Length: 1' 7.5\"   Head circumference: 34.3 cm       Labor Events and Shoulder Dystocia    Fetal Tracing Prior to Delivery: Category 1, Category 2  Fetal Tracing Comments: category II with pushing     Delivery (Maternal) (Provider to Complete) ([id number])    Episiotomy: None  Perineal lacerations: 3rd Repaired?: Yes   Repair suture: 0 Vicryl, 3-0 Monocryl  Number of repair packets: 5  Genital tract inspection done: Pos     Blood Loss  Mother: Kaci Davis #[id number]   Start of Mother's Information    Delivery Blood Loss  23 1330 - 23 1721    Delivery QBL (mL) Hospital Encounter 523 mL    Total  523 mL         End of Mother's Information  Mother: Kaci Davis #[id number]          Delivery - Provider to Complete ([id number])    Delivering clinician: Jocelyn Hines DO  Delivery Type (Choose the 1 that will go to the Birth History): Vaginal, Spontaneous                   Other personnel:  Provider Role   Mikayla Priest, RN Registered Nurse   Ritu Thornton, RN Registered Nurse   Valeri Mendez RN Registered Nurse                Placenta    Date/Time: 3/11/2023  4:32 PM  Removal: Spontaneous  Disposition: Hospital disposal           Anesthesia    Method: Epidural  Cervical dilation at placement: 4-7                Presentation and Position    Presentation: Vertex    Position: Left Occiput Anterior                 Jocelyn Hines DO  "

## 2023-03-11 NOTE — PROVIDER NOTIFICATION
03/11/23 1053   Provider Notification   Provider Name/Title Dr Hines   Method of Notification Phone   Request Evaluate - Remote   Notification Reason Status Update     Dr Hines notified of SVE 2/70/0 Purdy of 7  MD orders for Pitocin augmentation.

## 2023-03-11 NOTE — LETTER
[facility]  [address]  [facility] [address]  Phone: [phone]  Fax: [phone]    23    Kaciwong Davis  [address] [facility] [address]      To whom it may concern:     Samuel Davis accompanied wife for delivery of baby on 3/11/23 and remained with wife and  until discharge on 3/13/23.          Sincerely,    Ysabel Barnes RN  Birthplace Charge RN  [phone]

## 2023-03-11 NOTE — PROVIDER NOTIFICATION
03/11/23 1419   Provider Notification   Provider Name/Title Dr. Hines   Method of Notification Phone     SVE 6/100/0 getting an epidural

## 2023-03-11 NOTE — PLAN OF CARE
Data: Patient admitted to room MAC2 at 0948. Patient is a . Prenatal record reviewed.   OB History    Para Term  AB Living   1 0 0 0 0 0   SAB IAB Ectopic Multiple Live Births   0 0 0 0 0      # Outcome Date GA Lbr Luis/2nd Weight Sex Delivery Anes PTL Lv   1 Current            .  Medical History:No significant medical hx.   Gestational age 38w5d. Vital signs per doc flowsheet. Fetal movement present. Patient reports spontaneous rupture of membranes at 0815 as reason for admission. Support persons are present.  Action:  Verbal consent for EFM, external fetal monitors applied. Admission assessment completed. Patient and support persons educated on labor process. Patient instructed to report change in fetal movement, contractions, vaginal leaking of fluid or bleeding, abdominal pain, or any concerns related to the pregnancy to her nurse/physician. Patient oriented to room, call light in reach.   Response: Dr. Hines informed of pt arrival, SROM/clear, GBS neg. Plan per provider is SVE, then po cytotec or Pitocin based on Purdy score. Patient verbalized understanding of education and verbalized agreement with plan. Patient coping with labor. Denies ctx at this time, is comfortable.

## 2023-03-11 NOTE — TELEPHONE ENCOUNTER
"OB Triage Call      Is patient's OB/Midwife with the [facility] or [facility]? [facility]- Proceed with triage     Reason for call: water just broke    Assessment: denies bleeding, pain. Normal fetal movement.     Plan: L and D    Patient plans to deliver at [facility]     Patient's primary OB Provider is Dr. Quintanilla.      Per protocol recommendations Patient to be evaluated in L&D. Patient's primary OB is [facility].  Labor and delivery at [facility] ([phone]) notified of patient's pending arrival.  Report given to Dian.      Is patient's delivering hospital on divert? No      38w5d    Estimated Date of Delivery: Mar 20, 2023        OB History    Para Term  AB Living   1 0 0 0 0 0   SAB IAB Ectopic Multiple Live Births   0 0 0 0 0      # Outcome Date GA Lbr Luis/2nd Weight Sex Delivery Anes PTL Lv   1 Current                No results found for: GBS       Ladi Ambrosio RN 23 8:25 AM  [facility] Nurse Advisor    Reason for Disposition    Leakage of fluid from vagina    Additional Information    Negative: [1] SEVERE abdominal pain (e.g., excruciating) AND [2] constant    Negative: Severe bleeding (e.g., continuous red blood from vagina, or large blood clots)    Negative: Umbilical cord hanging out of the vagina (shiny, white, curled appearance, \"like telephone cord\")    Negative: Uncontrollable urge to push (i.e., feels like baby is coming out now)    Negative: Can see baby    Negative: Sounds like a life-threatening emergency to the triager    Negative: < 20 weeks pregnant    Negative: Vaginal bleeding    Protocols used: PREGNANCY - RUPTURE OF MEMBRANES [id number]      "

## 2023-03-11 NOTE — ANESTHESIA PROCEDURE NOTES
Epidural catheter Procedure Note    Pre-Procedure   Staff -        Anesthesiologist:  Samuel Hernandez MD       Performed By: anesthesiologist       Location: OB       Pre-Anesthestic Checklist: patient identified, IV checked, risks and benefits discussed, informed consent, monitors and equipment checked, pre-op evaluation and at physician/surgeon's request  Timeout:       Correct Patient: Yes        Correct Procedure: Yes        Correct Site: Yes        Correct Position: Yes   Procedure Documentation  Procedure: epidural catheter       Diagnosis: labor       Patient Position: sitting       Patient Prep/Sterile Barriers: sterile gloves, mask, patient draped       Skin prep: Betadine       Local skin infiltrated with 3 mL of 1% lidocaine.        Insertion Site: L3-4. (midline approach).       Technique: LORT saline        YUDI at 5 cm.       Needle Type: ToFitsistanty needle       Needle Gauge: 17.        Needle Length (Inches): 3.5        Catheter: 19 G.          Catheter threaded easily.           Threaded 10 cm at skin.         # of attempts: 1 and  # of redirects:  0    Assessment/Narrative         Paresthesias: No.       Test dose of mL at.         Test dose negative, 3 minutes after injection, for signs of intravascular, subdural, or intrathecal injection.       Insertion/Infusion Method: LORT saline       Aspiration negative for Heme or CSF via Epidural Catheter.     Comments:  Procedure tolerated well without apparent complications. Initial MDA bolus of 0.25% bupivacaine was incrementally given without difficulty or complications. Epidural infusion (0.125% bupi with fentanyl 2mcg/ml) started at 10 ml/hr with PCEA of 5 ml q15min with a max cumulative dose of 25 ml/hr. PCEA instructions given and use encouraged PRN. Epidural expectations again reviewed and questions answered. Patient hemodynamically stable.  Patient and epidural functionality to be reassessed later via vital sign flowsheet, nursing communication,  "and/or patient report.  Anesthesiologist immediately available for ongoing assessment and management.           FOR [facility] ONLY:   Pain Team Contact information: please page the Pain Team Via [employer]. Search \"Pain\". During daytime hours, please page the attending first. At night please page the resident first.    "

## 2023-03-11 NOTE — PROVIDER NOTIFICATION
03/11/23 1440   Provider Notification   Provider Name/Title Dr Hines   Method of Notification Electronic Page   Request Evaluate in Person   Notification Reason SVE;Labor Status     MD paged to come to hospital. Patient complete after epidural placement and feeling pushy.  Dr Hines is 10min out and on her way

## 2023-03-12 LAB
HGB BLD-MCNC: 11.6 G/DL (ref 11.7–15.7)
T PALLIDUM AB SER QL: NONREACTIVE

## 2023-03-12 PROCEDURE — 250N000013 HC RX MED GY IP 250 OP 250 PS 637: Performed by: FAMILY MEDICINE

## 2023-03-12 PROCEDURE — 120N000001 HC R&B MED SURG/OB

## 2023-03-12 PROCEDURE — 36415 COLL VENOUS BLD VENIPUNCTURE: CPT | Performed by: FAMILY MEDICINE

## 2023-03-12 PROCEDURE — 85018 HEMOGLOBIN: CPT | Performed by: FAMILY MEDICINE

## 2023-03-12 RX ADMIN — IBUPROFEN 800 MG: 800 TABLET, FILM COATED ORAL at 18:33

## 2023-03-12 RX ADMIN — DOCUSATE SODIUM 100 MG: 100 CAPSULE, LIQUID FILLED ORAL at 21:17

## 2023-03-12 RX ADMIN — DOCUSATE SODIUM 100 MG: 100 CAPSULE, LIQUID FILLED ORAL at 10:52

## 2023-03-12 RX ADMIN — ACETAMINOPHEN 650 MG: 325 TABLET ORAL at 06:00

## 2023-03-12 RX ADMIN — LAMOTRIGINE 100 MG: 100 TABLET ORAL at 21:17

## 2023-03-12 RX ADMIN — ACETAMINOPHEN 650 MG: 325 TABLET ORAL at 00:57

## 2023-03-12 RX ADMIN — IBUPROFEN 800 MG: 800 TABLET, FILM COATED ORAL at 10:52

## 2023-03-12 RX ADMIN — FLUOXETINE 40 MG: 20 CAPSULE ORAL at 21:17

## 2023-03-12 RX ADMIN — IBUPROFEN 800 MG: 800 TABLET, FILM COATED ORAL at 00:57

## 2023-03-12 RX ADMIN — ACETAMINOPHEN 650 MG: 325 TABLET ORAL at 18:33

## 2023-03-12 RX ADMIN — ACETAMINOPHEN 650 MG: 325 TABLET ORAL at 13:26

## 2023-03-12 ASSESSMENT — ACTIVITIES OF DAILY LIVING (ADL)
ADLS_ACUITY_SCORE: 20

## 2023-03-12 NOTE — PLAN OF CARE
Pt is bonding well with baby girl- Yocasta. Pt is breastfeeding. Tolerates eating and drinking.  Fundus is firm, midline and 1 cm below umbilicus. Lochia is scant, rubra and pt denies having any clots. VSS. Voiding and passing gas. Ambulating independently. Tolerating perineal pain well with, tylenol and ibuprofen.- rating a 2/10.

## 2023-03-12 NOTE — PROVIDER NOTIFICATION
03/11/23 1938   Provider Notification   Provider Name/Title Dr. Hines   Method of Notification Electronic Page   Request Evaluate-Remote   Notification Reason Medication Request     Md notified of needing home meds ordered, telephone order to place home medications.

## 2023-03-12 NOTE — PLAN OF CARE
Patient meeting expected goals. Is up independent in room, meeting all personal and infant needs. VSS. Breastfeeding is going well and bonding well with infant. Pain is being managed with Ibuprofen and Tylenol. Also using tucks, ice packs and Benzocaine spray for perineum discomfort. Bonding behaviors noted. Safe sleep reviewed as was use of bulb syringe.

## 2023-03-12 NOTE — PLAN OF CARE
Data: Kaci Davis transferred to 450 via wheelchair at 1900. Baby transferred via parent's arms.  Action: Receiving unit notified of transfer: Yes. Patient and family notified of room change. Report given to ZAKIA Lovelace at 1915. Belongings sent to receiving unit. Accompanied by Registered Nurse. Oriented patient to surroundings. Call light within reach. ID bands double-checked with receiving RN.  Response: Patient tolerated transfer and is stable.    Patients mobililty level scored using the bedside mobility assistance tool (BMAT). Patient is at a mobility level test number: 4. Mobility equipment used: none required. Required assist of 1 staff members. Further use of BMAT scoring not required.

## 2023-03-12 NOTE — ANESTHESIA POSTPROCEDURE EVALUATION
Patient: Kaci Davis    Procedure: * No procedures listed *       Anesthesia Type:  Epidural    Note:  Disposition: Inpatient   Postop Pain Control:            Sign Out: Well controlled pain   PONV: No   Neuro/Psych: Uneventful            Sign Out: Acceptable/Baseline neuro status   Airway/Respiratory: Uneventful            Sign Out: Acceptable/Baseline resp. status   CV/Hemodynamics: Uneventful            Sign Out: Acceptable CV status; No obvious hypovolemia; No obvious fluid overload   Other NRE: NONE   DID A NON-ROUTINE EVENT OCCUR? No    Event details/Postop Comments:      S/P epidural for labor.   I or my partner was immediately available for management of this patient during epidural analgesia infusion.  VSS.  Doing well. Block resolved.  Neuro at baseline. Denies positional headache. Minimal side effects easily managed w/ PRN meds. No apparent anesthetic complications. No follow-up required.    AURY Valdez MD             Last vitals:  Vitals:    03/11/23 2104 03/12/23 0129 03/12/23 1054   BP: 105/50 96/46 97/55   Pulse: 76 68 80   Resp: 16 16 16   Temp: 97.6  F (36.4  C) 98  F (36.7  C) 97.7  F (36.5  C)   SpO2:          Electronically Signed By: Lg Valdez MD  March 12, 2023  2:47 PM

## 2023-03-12 NOTE — LACTATION NOTE
Lactation in to see patient. First time parents. Baby at breast at time of visit. Baby vigorous at breast at time of visit. Bottom lip pulled down to widen latch. Swallows heard and pointed out to parents. Reviewed basic breastfeeding education, sore nipples, and engorgement.  Encouraged feeding every 2-3 hours. Has a pump ordered for home. Knows she can call for assistance or questions.

## 2023-03-13 VITALS
RESPIRATION RATE: 16 BRPM | OXYGEN SATURATION: 97 % | TEMPERATURE: 97.8 F | DIASTOLIC BLOOD PRESSURE: 70 MMHG | BODY MASS INDEX: 29.86 KG/M2 | HEIGHT: 68 IN | WEIGHT: 197 LBS | SYSTOLIC BLOOD PRESSURE: 113 MMHG | HEART RATE: 82 BPM

## 2023-03-13 PROCEDURE — 250N000013 HC RX MED GY IP 250 OP 250 PS 637: Performed by: FAMILY MEDICINE

## 2023-03-13 RX ORDER — DOCUSATE SODIUM 100 MG/1
100 CAPSULE, LIQUID FILLED ORAL 2 TIMES DAILY
Qty: 60 CAPSULE | Refills: 0 | Status: SHIPPED | OUTPATIENT
Start: 2023-03-13 | End: 2023-04-12

## 2023-03-13 RX ORDER — CALCIUM POLYCARBOPHIL 625 MG 625 MG/1
2 TABLET ORAL DAILY
Qty: 60 TABLET | Refills: 0 | Status: SHIPPED | OUTPATIENT
Start: 2023-03-13 | End: 2023-04-12

## 2023-03-13 RX ORDER — IBUPROFEN 200 MG
400 TABLET ORAL EVERY 4 HOURS PRN
COMMUNITY
Start: 2023-03-13 | End: 2023-09-28

## 2023-03-13 RX ORDER — ACETAMINOPHEN 325 MG/1
650 TABLET ORAL EVERY 4 HOURS PRN
COMMUNITY
Start: 2023-03-13 | End: 2023-09-28

## 2023-03-13 RX ADMIN — ACETAMINOPHEN 650 MG: 325 TABLET ORAL at 09:06

## 2023-03-13 RX ADMIN — IBUPROFEN 800 MG: 800 TABLET, FILM COATED ORAL at 09:06

## 2023-03-13 RX ADMIN — DOCUSATE SODIUM 100 MG: 100 CAPSULE, LIQUID FILLED ORAL at 09:12

## 2023-03-13 ASSESSMENT — ACTIVITIES OF DAILY LIVING (ADL)
ADLS_ACUITY_SCORE: 20

## 2023-03-13 NOTE — PLAN OF CARE
VSS. Voiding without difficulty. Taking Ibuprofen and tylenol for uterine cramping .  Mom is independent with self and baby cares. FOB at bedside , supportive.

## 2023-03-13 NOTE — PLAN OF CARE
Pt doing well today. VSS. FFU. Perineum intact. Encouraged pt to use ice packs and tucks for perineum discomfort. Breastfeeding going well per pt. Pt and  bonding well with infant. Pt states she is ready to discharge to home today. Will facilitate discharge once pt and infant physicians round.   1300: Discharge instructions discussed with pt and . Pt verbalized understanding. Pt discharged to home with  and infant at 1410.   Tamiko James RN on 3/13/2023 at 10:08 AM

## 2023-03-13 NOTE — PLAN OF CARE
Pt is bonding well with baby girl- Yocasta. Pt is breastfeeding very well. Tolerates eating and drinking.  Fundus is firm, midline and 2 cm below umbilicus. Lochia is scant, rubra and pt denies having any clots. VSS. Voiding and passing gas. Ambulating independently. Tolerating perineal pain well with, tylenol, ibuprofen and tucks pads.- rating a 2/10.

## 2023-03-13 NOTE — DISCHARGE INSTRUCTIONS

## 2023-03-13 NOTE — PROGRESS NOTES
[facility] Post-Partum Progress Note          Assessment and Plan:    Assessment:   Post-partum day #2  Normal spontaneous vaginal delivery  L&D complications: Third degree injury        Doing well.  Clean wound without signs of infection.  Normal healing wound.  No immediate surgical complications identified.  No excessive bleeding  Pain well-controlled.  Cares of a 3rd degree and the importance of a bowel regimen discussed at length  Pt and her SO understand and accept      Plan:   Ambulation encouraged  Breast feeding strategies discussed  Monitor wound for signs of infection  Pain control measures as needed  Reportable signs and symptoms dicussed with the patient  .No lifting > 10 lbs for 6 wks  Take your tempature twice daily for 3 days and call if > 100.4  Nothing in vagina for 6 wks  Continue taking prenatal MVI daily for 1 month  Bowel regimen with bulk laxative and wetting agent discussed   Discharge later today           Interval History:   Doing well.  Pain is well-controlled.  No fevers.  No history of foul-smelling vaginal discharge.  Good appetite.  Denies chest pain, shortness of breath, nausea or vomiting.  Vaginal bleeding is similar to a heavy menstrual flow.  Ambulatory.  Breastfeeding well.          Significant Problems:    No past medical history on file.          Review of Systems:    The patient denies any chest pain, shortness of breath, excessive pain, fever, chills, purulent drainage from the wound, nausea or vomiting.          Medications:     All medications related to the patient's surgery have been reviewed  Current Facility-Administered Medications   Medication     acetaminophen (TYLENOL) tablet 650 mg     benzocaine (AMERICAINE) 20 % topical spray     carboprost (HEMABATE) injection 250 mcg     docusate sodium (COLACE) capsule 100 mg     FLUoxetine (PROzac) capsule 40 mg     hydrocortisone (Perianal) (ANUSOL-HC) 2.5 % cream     ibuprofen (ADVIL/MOTRIN)  tablet 800 mg     lamoTRIgine (LaMICtal) tablet 100 mg     lanolin cream     methylergonovine (METHERGINE) injection 200 mcg     misoprostol (CYTOTEC) tablet 400 mcg    Or     misoprostol (CYTOTEC) tablet 800 mcg     No MMR Needed - Assessment: Patient does not need MMR vaccine     No Tdap Needed - Assessment: Patient does not need Tdap vaccine     oxytocin (PITOCIN) 30 units in 500 mL 0.9% NaCl infusion     oxytocin (PITOCIN) injection 10 Units     tranexamic acid 1 g in 100 mL NS IV bag (premix)             Physical Exam:   Vitals were reviewed  All vitals stable  Temp: 98.3  F (36.8  C) Temp src: Oral BP: 99/53 Pulse: 67   Resp: 16   O2 Device: None (Room air)    Uterine fundus is firm, non-tender and at the level of the umbilicus          Data:     All laboratory data related to this surgery reviewed  Hemoglobin   Date Value Ref Range Status   03/12/2023 11.6 (L) 11.7 - 15.7 g/dL Final   03/11/2023 13.6 11.7 - 15.7 g/dL Final   12/27/2022 12.2 11.7 - 15.7 g/dL Final   08/01/2022 14.7 11.7 - 15.7 g/dL Final   09/11/2018 14.0 11.7 - 15.7 g/dL Final   08/14/2018 14.7 11.7 - 15.7 g/dL Final   07/10/2018 15.0 11.7 - 15.7 g/dL Final   06/12/2018 14.4 11.7 - 15.7 g/dL Final   04/13/2018 14.4 11.7 - 15.7 g/dL Final     No imaging studies have been ordered    Skyler Cates MD

## 2023-03-20 ENCOUNTER — TELEPHONE (OUTPATIENT)
Dept: OBGYN | Facility: CLINIC | Age: 33
End: 2023-03-20

## 2023-03-20 NOTE — TELEPHONE ENCOUNTER
Pt called asking that we call her employer [employer] at [phone] to give them confirmation of her delivery. Pt gave us verbal authorization to share her medical information in regards to her pregnancy and delivery with them.

## 2023-03-20 NOTE — TELEPHONE ENCOUNTER
I spoke with Miguel at the # below.  He confirmed date of delivery, providers names and clinic name/ phone/fax  and dates of admission/ discharge.   He states he will connect this information to her claim and will call with any further questions.   Miri Rodriguez CMA

## 2023-04-14 ENCOUNTER — MEDICAL CORRESPONDENCE (OUTPATIENT)
Dept: HEALTH INFORMATION MANAGEMENT | Facility: CLINIC | Age: 33
End: 2023-04-14
Payer: COMMERCIAL

## 2023-04-26 ENCOUNTER — PRENATAL OFFICE VISIT (OUTPATIENT)
Dept: OBGYN | Facility: CLINIC | Age: 33
End: 2023-04-26
Payer: COMMERCIAL

## 2023-04-26 VITALS — DIASTOLIC BLOOD PRESSURE: 64 MMHG | WEIGHT: 167 LBS | BODY MASS INDEX: 25.39 KG/M2 | SYSTOLIC BLOOD PRESSURE: 98 MMHG

## 2023-04-26 PROCEDURE — 99207 PR POST PARTUM EXAM: CPT | Performed by: OBSTETRICS & GYNECOLOGY

## 2023-04-26 NOTE — PROGRESS NOTES
SUBJECTIVE:  Kaci Davis,  is here for a postpartum visit.  She had a  on 3/11/23 delivering a healthy baby girl, named Augie weighing 7 lbs 11 oz at term.      HPI:  Presented with SROM and induced           View : No data to display.                Delivery complications:  No  Breast feeding:  Yes, going well  Bladder problems:  No  Bowel problems/hemorrhoids:  No  Episiotomy/laceration/incision healed? Yes: 3rd degree  Vaginal flow:  None  Flint:  No  Contraception: unsure  Emotional adjustment:  doing well and happy  Back to work:      12 point review of systems negative other than symptoms noted below.    OBJECTIVE:  Vitals: BP 98/64   Wt 75.8 kg (167 lb)   LMP 2022 (Exact Date)   BMI 25.39 kg/m    BMI= Body mass index is 25.39 kg/m .  General - pleasant female in no acute distress.  Breast -  deferred  Abdomen - No incision  Pelvic - EG: normal adult female, BUS: within normal limits, Vagina: well rugated, no discharge, Cervix: no lesions or CMT, Uterus: firm, normal sized and nontender, anteverted in position. Adnexae: no masses or tenderness.  Rectovaginal - deferred.    ASSESSMENT:    ICD-10-CM    1. Routine postpartum follow-up  Z39.2           PLAN:  May resume normal activities without restrictions.  Pap smear was not done today.    Full counseling was provided, and all questions were answered.   Return to clinic in one year for an annual visit.   Unsure on contraception.  Options discussed    Sergey Whitney MD

## 2023-05-18 ENCOUNTER — MEDICAL CORRESPONDENCE (OUTPATIENT)
Dept: HEALTH INFORMATION MANAGEMENT | Facility: CLINIC | Age: 33
End: 2023-05-18
Payer: COMMERCIAL

## 2023-06-03 ENCOUNTER — HEALTH MAINTENANCE LETTER (OUTPATIENT)
Age: 33
End: 2023-06-03

## 2023-09-28 ENCOUNTER — OFFICE VISIT (OUTPATIENT)
Dept: FAMILY MEDICINE | Facility: CLINIC | Age: 33
End: 2023-09-28
Payer: COMMERCIAL

## 2023-09-28 VITALS
SYSTOLIC BLOOD PRESSURE: 104 MMHG | HEART RATE: 66 BPM | DIASTOLIC BLOOD PRESSURE: 64 MMHG | TEMPERATURE: 97 F | WEIGHT: 172.9 LBS | BODY MASS INDEX: 26.21 KG/M2 | HEIGHT: 68 IN | OXYGEN SATURATION: 98 % | RESPIRATION RATE: 18 BRPM

## 2023-09-28 DIAGNOSIS — Z00.00 ROUTINE GENERAL MEDICAL EXAMINATION AT A HEALTH CARE FACILITY: Primary | ICD-10-CM

## 2023-09-28 DIAGNOSIS — Z13.220 LIPID SCREENING: ICD-10-CM

## 2023-09-28 DIAGNOSIS — Z13.1 SCREENING FOR DIABETES MELLITUS: ICD-10-CM

## 2023-09-28 DIAGNOSIS — Z13.228 SCREENING FOR METABOLIC DISORDER: ICD-10-CM

## 2023-09-28 LAB
ANION GAP SERPL CALCULATED.3IONS-SCNC: 10 MMOL/L (ref 7–15)
BUN SERPL-MCNC: 9.7 MG/DL (ref 6–20)
CALCIUM SERPL-MCNC: 9.3 MG/DL (ref 8.6–10)
CHLORIDE SERPL-SCNC: 103 MMOL/L (ref 98–107)
CHOLEST SERPL-MCNC: 205 MG/DL
CREAT SERPL-MCNC: 0.96 MG/DL (ref 0.51–0.95)
EGFRCR SERPLBLD CKD-EPI 2021: 81 ML/MIN/1.73M2
GLUCOSE SERPL-MCNC: 79 MG/DL (ref 70–99)
HCO3 SERPL-SCNC: 26 MMOL/L (ref 22–29)
HDLC SERPL-MCNC: 58 MG/DL
LDLC SERPL CALC-MCNC: 129 MG/DL
NONHDLC SERPL-MCNC: 147 MG/DL
POTASSIUM SERPL-SCNC: 4 MMOL/L (ref 3.4–5.3)
SODIUM SERPL-SCNC: 139 MMOL/L (ref 135–145)
TRIGL SERPL-MCNC: 89 MG/DL

## 2023-09-28 PROCEDURE — 80061 LIPID PANEL: CPT | Performed by: FAMILY MEDICINE

## 2023-09-28 PROCEDURE — 99395 PREV VISIT EST AGE 18-39: CPT | Performed by: FAMILY MEDICINE

## 2023-09-28 PROCEDURE — 80048 BASIC METABOLIC PNL TOTAL CA: CPT | Performed by: FAMILY MEDICINE

## 2023-09-28 PROCEDURE — 36415 COLL VENOUS BLD VENIPUNCTURE: CPT | Performed by: FAMILY MEDICINE

## 2023-09-28 SDOH — HEALTH STABILITY: PHYSICAL HEALTH: ON AVERAGE, HOW MANY DAYS PER WEEK DO YOU ENGAGE IN MODERATE TO STRENUOUS EXERCISE (LIKE A BRISK WALK)?: 4 DAYS

## 2023-09-28 ASSESSMENT — ENCOUNTER SYMPTOMS
WEAKNESS: 0
CHILLS: 0
HEMATURIA: 0
NAUSEA: 0
JOINT SWELLING: 0
NERVOUS/ANXIOUS: 0
FREQUENCY: 0
DYSURIA: 0
CONSTIPATION: 0
HEMATOCHEZIA: 0
HEARTBURN: 0
ABDOMINAL PAIN: 0
SHORTNESS OF BREATH: 0
PARESTHESIAS: 0
MYALGIAS: 0
HEADACHES: 0
ARTHRALGIAS: 0
COUGH: 0
DIZZINESS: 0
EYE PAIN: 0
FEVER: 0
SORE THROAT: 0
PALPITATIONS: 0
DIARRHEA: 0

## 2023-09-28 ASSESSMENT — SOCIAL DETERMINANTS OF HEALTH (SDOH)
HOW OFTEN DO YOU GET TOGETHER WITH FRIENDS OR RELATIVES?: TWICE A WEEK
HOW OFTEN DO YOU ATTENT MEETINGS OF THE CLUB OR ORGANIZATION YOU BELONG TO?: MORE THAN 4 TIMES PER YEAR
DO YOU BELONG TO ANY CLUBS OR ORGANIZATIONS SUCH AS CHURCH GROUPS UNIONS, FRATERNAL OR ATHLETIC GROUPS, OR SCHOOL GROUPS?: YES
IN A TYPICAL WEEK, HOW MANY TIMES DO YOU TALK ON THE PHONE WITH FAMILY, FRIENDS, OR NEIGHBORS?: THREE TIMES A WEEK

## 2023-09-28 ASSESSMENT — LIFESTYLE VARIABLES
HOW MANY STANDARD DRINKS CONTAINING ALCOHOL DO YOU HAVE ON A TYPICAL DAY: 1 OR 2
HOW OFTEN DO YOU HAVE A DRINK CONTAINING ALCOHOL: 2-3 TIMES A WEEK
AUDIT-C TOTAL SCORE: 4
HOW OFTEN DO YOU HAVE SIX OR MORE DRINKS ON ONE OCCASION: LESS THAN MONTHLY
SKIP TO QUESTIONS 9-10: 0

## 2023-09-28 NOTE — COMMUNITY RESOURCES LIST (ENGLISH)
09/28/2023   [facility]illiant.org  N/A  For questions about this resource list or additional care needs, please contact your primary care clinic or care manager.  Phone: [phone]   Email: N/A   Address: [address] [place] [address]   Hours: N/A        Mental Health       Individual counseling  1  Mary Whipple MS, LMFT Distance: 8.23 miles      Phone/Virtual   [address] [place] [address]  Language: English  Hours: Mon - Fri Appt. Only  Fees: Self Pay   Phone: (131) 862-9478 Email: [email or web address] Website: https://www.relationshiptherapy.org/     2  [facility]. - [place] - Individual Psychotherapy Distance: 8.37 miles      In-Person   [address] [facility] [address] [place] [address]  Language: English  Hours: Mon - Thu 8:00 AM - 5:00 PM , Fri 8:00 AM - 12:00 PM  Fees: Insurance, Self Pay   Phone: (960) 544-9460 Email: chevy@[email or web address] Website: https://www.Appfrica/Bookmycabar-[place]-in-[place]/     Mental health support group  3  [facility] Distance: 18.83 miles      In-Person, Phone/Virtual   [address] [address] [place] [address]  Language: English  Hours: Mon - Fri 9:00 AM - 5:00 PM Appt. Only  Fees: Free   Phone: (648) 854-7396 Email: paola@[facility].org Website: http://www.Zooplus.org/     4  [facility], [facility] - Hoarding Classes Distance: 21.46 miles      Phone/Virtual   [address] [place] [address]  Language: English  Hours: Tue - Fri 8:00 AM - 7:00 PM , Sat 9:00 AM - 4:00 PM  Fees: Insurance, Self Pay   Phone: (563) 766-6536 Email: cathleencare@[employer] Website: http://www.Shop pirate.Fronto/          Important Numbers & Websites       Emergency Services   911  [facility] Services   311  Poison Control   (827) 920-4121  Suicide Prevention Lifeline   (992) 263-5275 (TALK)  Child Abuse Hotline   (178) 229-6993 (4-A-Child)  Sexual Assault Hotline   (434) 764-3605  (HOPE)  National Runaway Safeline   (514) 157-9906 (RUNAWAY)  All-Options Talkline   (204) 114-1880  Substance Abuse Referral   (534) 212-4589 (HELP)

## 2023-09-28 NOTE — COMMUNITY RESOURCES LIST (ENGLISH)
09/28/2023   [facility] Probity  N/A  For questions about this resource list or additional care needs, please contact your primary care clinic or care manager.  Phone: [phone]   Email: N/A   Address: [address] [place] [address]   Hours: N/A        Mental Health       Individual counseling  1  Mary Whipple MS, LMFT Distance: 8.23 miles      Phone/Virtual   [address] [place] [address]  Language: English  Hours: Mon - Fri Appt. Only  Fees: Self Pay   Phone: (169) 370-9654 Email: [email or web address]@[email or web address] Website: https://www.relationshiptherapy.org/     2  [employer] [facility]. - [place] - Individual Psychotherapy Distance: 8.37 miles      In-Person   [address] [facility] [address] [place] [address]  Language: English  Hours: Mon - Thu 8:00 AM - 5:00 PM , Fri 8:00 AM - 12:00 PM  Fees: Insurance, Self Pay   Phone: (723) 717-1099 Email: chevy@[employer] Website: https://www.Threadbox/Course Heroar-[place]-in-[place]/     Mental health support group  3  [facility] Distance: 18.83 miles      In-Person, Phone/Virtual   [address] [address] [place] [address]  Language: English  Hours: Mon - Fri 9:00 AM - 5:00 PM Appt. Only  Fees: Free   Phone: (494) 475-1188 Email: paola@[facility].org Website: http://www.Piczo.org/     4  [facility], [facility] - Hoarding Classes Distance: 21.46 miles      Phone/Virtual   [address] [place] [address]  Language: English  Hours: Tue - Fri 8:00 AM - 7:00 PM , Sat 9:00 AM - 4:00 PM  Fees: Insurance, Self Pay   Phone: (689) 248-4224 Email: cathleencare@[employer] Website: http://www.[employer]x [employer]ical.AquaHydrate/          Important Numbers & Websites       Emergency Services   911  [facility] Services   311  Poison Control   (808) 953-5163  Suicide Prevention Lifeline   (336) 746-6781 (TALK)  Child Abuse Hotline   (220) 333-6414 (4-A-Child)  Sexual Assault Hotline   (229) 894-9083  (HOPE)  National Runaway Safeline   (992) 400-6278 (RUNAWAY)  All-Options Talkline   (402) 880-5551  Substance Abuse Referral   (745) 820-4692 (HELP)

## 2023-09-28 NOTE — COMMUNITY RESOURCES LIST (ENGLISH)
09/28/2023   [facility] - Outpatient Clinics  N/A  For additional resource needs, please contact your health insurance member services or your primary care team.  Phone: [phone]   Email: N/A   Address: [address] [place] [address]   Hours: N/A        Mental Health       Individual counseling  1  Mary Whipple MS, LMFT Distance: 8.23 miles      Phone/Virtual   [address] [place] [address]  Language: English  Hours: Mon - Fri Appt. Only  Fees: Self Pay   Phone: (669) 369-5927 Email: [email or web address]@relationshiptherapy.org Website: https://www.relationshiptherapy.org/     2  [employer] [facility]. - [place] - Individual Psychotherapy Distance: 8.37 miles      In-Person   [address] [place] [address]  Language: English  Hours: Mon - Thu 8:00 AM - 5:00 PM , Fri 8:00 AM - 12:00 PM  Fees: Insurance, Self Pay   Phone: (681) 205-6322 Email: chevy@[email or web address] Website: https://www.EUDOWEB/ColorChipar-[place]-in-[place]-mn/     Mental health support group  3  [facility] Distance: 18.83 miles      In-Person, Phone/Virtual   [address] [place] [address]  Language: English  Hours: Mon - Fri 9:00 AM - 5:00 PM Appt. Only  Fees: Free   Phone: (280) 722-7731 Email: paola@[facility].[employer] Website: http://www.bCommunities.org/     4  [facility], [facility] - Hoarding Classes Distance: 21.46 miles      Phone/Virtual   [address] [place] [address]  Language: English  Hours: Tue - Fri 8:00 AM - 7:00 PM , Sat 9:00 AM - 4:00 PM  Fees: Insurance, Self Pay   Phone: (112) 560-1250 Email: [email or web address]@[employer] Website: http://www.Zinc Ahead.com/          Important Numbers & Websites       [facility]   [address] [place] [address].org  Poison Control   (208) 199-6982 [facility].org  Suicide and Crisis Lifeline   988 988[facility].org  Childhelp Ama Child Abuse Hotline   323.800.8480 Childhelphotline.org  Ama Sexual Assault Hotline    (644) 610-4252 (HOPE) Remedy Partnersn.org  National Runaway Safeline   (268) 542-5002 (RUNAWAY) LanyonruSocietyOne.org  Pregnancy & Postpartum Support [place]   Call/text 603-942-4588 Ppsupportmn.org  Substance Abuse National Helpline ([facility]   264-131-HELP (4045) Findtreatment.gov  Emergency Services   910

## 2023-09-28 NOTE — PROGRESS NOTES
SUBJECTIVE:   CC: Kaci is an 33 year old who presents for preventive health visit.       9/28/2023     9:24 AM   Additional Questions   Roomed by Remed[facility]     Lives with 6 months old daughter   Doing well .   No concern .  Following with psychiatrist .      Healthy Habits:     Getting at least 3 servings of Calcium per day:  Yes    Bi-annual eye exam:  Yes    Dental care twice a year:  Yes    Sleep apnea or symptoms of sleep apnea:  None    Diet:  Regular (no restrictions)    Frequency of exercise:  4-5 days/week    Duration of exercise:  30-45 minutes    Taking medications regularly:  Yes    Medication side effects:  None    Additional concerns today:  No  : Yes, advance care planning is on file.    Social History     Tobacco Use    Smoking status: Never    Smokeless tobacco: Never   Substance Use Topics    Alcohol use: Not on file             9/28/2023     9:20 AM   Alcohol Use   Prescreen: >3 drinks/day or >7 drinks/week? No          No data to display              Reviewed orders with patient.  Reviewed health maintenance and updated orders accordingly - Yes    Breast Cancer Screening:    FHS-7:       9/28/2023     9:25 AM   Breast CA Risk Assessment (FHS-7)   Did any of your first-degree relatives have breast or ovarian cancer? Yes   Did any of your relatives have bilateral breast cancer? No   Did any man in your family have breast cancer? No   Did any woman in your family have breast and ovarian cancer? No   Did any woman in your family have breast cancer before age 50 y? Yes   Do you have 2 or more relatives with breast and/or ovarian cancer? Yes   Do you have 2 or more relatives with breast and/or bowel cancer? Unknown     click delete button to remove this line now  Pertinent mammograms are reviewed under the imaging tab.    History of abnormal Pap smear: NO - age 30-65 PAP every 5 years with negative HPV co-testing recommended      Latest Ref Rng & Units 9/14/2022    12:25 PM   PAP / HPV   PAP  Negative for  "Intraepithelial Lesion or Malignancy (NILM)    HPV 16 DNA Negative Negative    HPV 18 DNA Negative Negative    Other HR HPV Negative Negative      Reviewed and updated as needed this visit by clinical staff   Tobacco  Allergies  Meds              Reviewed and updated as needed this visit by Provider                 No past medical history on file.   Past Surgical History:   Procedure Laterality Date    KNEE SURGERY Bilateral 2008    repair of both knees    LEG SURGERY Right 2016    r leg       Review of Systems   Constitutional:  Negative for chills and fever.   HENT:  Negative for congestion, ear pain, hearing loss and sore throat.    Eyes:  Negative for pain and visual disturbance.   Respiratory:  Negative for cough and shortness of breath.    Cardiovascular:  Negative for chest pain, palpitations and peripheral edema.   Gastrointestinal:  Negative for abdominal pain, constipation, diarrhea, heartburn, hematochezia and nausea.   Genitourinary:  Negative for dysuria, frequency, genital sores, hematuria and urgency.   Musculoskeletal:  Negative for arthralgias, joint swelling and myalgias.   Skin:  Negative for rash.   Neurological:  Negative for dizziness, weakness, headaches and paresthesias.   Psychiatric/Behavioral:  Negative for mood changes. The patient is not nervous/anxious.         OBJECTIVE:   /64   Pulse 66   Temp 97  F (36.1  C) (Tympanic)   Resp 18   Ht 1.715 m (5' 7.5\")   Wt 78.4 kg (172 lb 14.4 oz)   LMP  (LMP Unknown)   SpO2 98%   Breastfeeding No   BMI 26.68 kg/m    Physical Exam  GENERAL: healthy, alert and no distress  EYES: Eyes grossly normal to inspection, PERRL and conjunctivae and sclerae normal  HENT: ear canals and TM's normal, nose and mouth without ulcers or lesions  NECK: no adenopathy, no asymmetry, masses, or scars and thyroid normal to palpation  RESP: lungs clear to auscultation - no rales, rhonchi or wheezes  BREAST: normal without masses, tenderness or nipple " "discharge and no palpable axillary masses or adenopathy  CV: regular rate and rhythm, normal S1 S2, no S3 or S4, no murmur, click or rub, no peripheral edema and peripheral pulses strong  ABDOMEN: soft, nontender, no hepatosplenomegaly, no masses and bowel sounds normal  MS: no gross musculoskeletal defects noted, no edema  SKIN: no suspicious lesions or rashes  NEURO: Normal strength and tone, mentation intact and speech normal  PSYCH: mentation appears normal, affect normal/bright    Diagnostic Test Results:  Labs reviewed in Epic    ASSESSMENT/PLAN:   (Z00.00) Routine general medical examination at a health care facility  (primary encounter diagnosis)  Comment:   Plan: Discussed healthy eating   Discussed maintaining ideal weight   Discussed regular exercise .    (Z13.1) Screening for diabetes mellitus  Comment: Lab ordered .    (Z13.220) Lipid screening  Comment:   Plan: Lipid panel reflex to direct LDL Fasting          (Z13.228) Screening for metabolic disorder  Comment:   Plan: Basic metabolic panel  (Ca, Cl, CO2, Creat,         Gluc, K, Na, BUN)                COUNSELING:  Reviewed preventive health counseling, as reflected in patient instructions      BMI:   Estimated body mass index is 26.68 kg/m  as calculated from the following:    Height as of this encounter: 1.715 m (5' 7.5\").    Weight as of this encounter: 78.4 kg (172 lb 14.4 oz).   Weight management plan: Discussed healthy diet and exercise guidelines      She reports that she has never smoked. She has never used smokeless tobacco.          Maday Melvin MD  [facility]  "

## 2024-03-28 ENCOUNTER — VIRTUAL VISIT (OUTPATIENT)
Dept: OBGYN | Facility: CLINIC | Age: 34
End: 2024-03-28
Payer: COMMERCIAL

## 2024-03-28 DIAGNOSIS — Z34.90 SUPERVISION OF NORMAL PREGNANCY: Primary | ICD-10-CM

## 2024-03-28 PROCEDURE — 99207 PR NO CHARGE NURSE ONLY: CPT | Mod: 93

## 2024-04-10 ENCOUNTER — OFFICE VISIT (OUTPATIENT)
Dept: URGENT CARE | Facility: URGENT CARE | Age: 34
End: 2024-04-10
Payer: COMMERCIAL

## 2024-04-10 ENCOUNTER — NURSE TRIAGE (OUTPATIENT)
Dept: FAMILY MEDICINE | Facility: CLINIC | Age: 34
End: 2024-04-10

## 2024-04-10 VITALS
HEART RATE: 94 BPM | SYSTOLIC BLOOD PRESSURE: 103 MMHG | HEIGHT: 68 IN | WEIGHT: 170 LBS | BODY MASS INDEX: 25.76 KG/M2 | RESPIRATION RATE: 16 BRPM | TEMPERATURE: 97.9 F | OXYGEN SATURATION: 98 % | DIASTOLIC BLOOD PRESSURE: 70 MMHG

## 2024-04-10 DIAGNOSIS — J06.9 VIRAL URI WITH COUGH: Primary | ICD-10-CM

## 2024-04-10 DIAGNOSIS — Z33.1 PREGNANCY, INCIDENTAL: ICD-10-CM

## 2024-04-10 DIAGNOSIS — J02.9 SORE THROAT: ICD-10-CM

## 2024-04-10 LAB
DEPRECATED S PYO AG THROAT QL EIA: NEGATIVE
FLUAV AG SPEC QL IA: NEGATIVE
FLUBV AG SPEC QL IA: NEGATIVE
GROUP A STREP BY PCR: NOT DETECTED

## 2024-04-10 PROCEDURE — 99213 OFFICE O/P EST LOW 20 MIN: CPT | Performed by: FAMILY MEDICINE

## 2024-04-10 PROCEDURE — 87804 INFLUENZA ASSAY W/OPTIC: CPT

## 2024-04-10 PROCEDURE — 87651 STREP A DNA AMP PROBE: CPT | Performed by: FAMILY MEDICINE

## 2024-04-10 NOTE — PROGRESS NOTES
"  ICD-10-CM    1. Viral URI with cough  J06.9       2. Sore throat  J02.9 Streptococcus A Rapid Screen w/Reflex to PCR - Clinic Collect     Influenza A & B Antigen - Clinic Collect     Group A Streptococcus PCR Throat Swab      3. Pregnancy, incidental  Z33.1         RST and influenza testing negative today.  No concerning findings on exam at this time.    PLAN:  Patient Instructions   Afrin nasal spray:  2 sprays both nostrils every morning and about 30 minutes before bedtime at night for up to 3 days in a row.  Do not use for more than 3 days straight.  Can restart after taking at least two days off.    Honey for cough, either in standard form or in a cough drop.    Drink plenty of fluids and rest as much as you're able.    SUBJECTIVE:  Kaci Davis is a 33 year old female who is almost 8 weeks pregnant and presents to  today with nasal congestion and cough for about a week, worse for 3 days.   and child also have similar symptoms and have tested negative for flu and strep and COVID.  She did test for COVID at home, and it was negative.  No rashes.  Starting to have some early pregnancy nausea.  No using any medications to help with symptoms.    OBJECTIVE:  /70   Pulse 94   Temp 97.9  F (36.6  C) (Oral)   Resp 16   Ht 1.715 m (5' 7.5\")   Wt 77.1 kg (170 lb)   LMP 02/15/2024 (Exact Date)   SpO2 98%   BMI 26.23 kg/m    GEN: mildly ill-appearing, in NAD  ENT: TMs normal, oral MMM, mildly erythematous pharynx, no exudates, uvula midline  Neck: no LAD noted  Lungs:  CTAB  CV:  RRR, no murmur    Results for orders placed or performed in visit on 04/10/24   Streptococcus A Rapid Screen w/Reflex to PCR - Clinic Collect     Status: Normal    Specimen: Throat; Swab   Result Value Ref Range    Group A Strep antigen Negative Negative   Influenza A & B Antigen - Clinic Collect     Status: Normal    Specimen: Nose; Swab   Result Value Ref Range    Influenza A antigen Negative Negative    Influenza B " antigen Negative Negative    Narrative    Test results must be correlated with clinical data. If necessary, results should be confirmed by a molecular assay or viral culture.

## 2024-04-10 NOTE — TELEPHONE ENCOUNTER
Pt calls stating she has cough for sore throat, cough, runny nose for 3-4 days.   She is 8 weeks pregnant.   She denies any fever.   COVID test was negative.     Daughter is in . Notification of COVID and Pneumonia in  in the last week.   Dtr has runny nose, mild cough.     Sore throat is mostly from coughing she thinks. She states her glands are a little swollen.   Coughing up a little green phlegm.   Coughing a lot at night, waking her up.     Advised to be seen today, no openings at clinics. Advised to go to . She agrees with this.         Reason for Disposition   SEVERE coughing spells (e.g., whooping sound after coughing, vomiting after coughing)    Additional Information   Negative: Bluish (or gray) lips or face   Negative: SEVERE difficulty breathing (e.g., struggling for each breath, speaks in single words)   Negative: Rapid onset of cough and has hives   Negative: Coughing started suddenly after medicine, an allergic food or bee sting   Negative: Difficulty breathing after exposure to flames, smoke, or fumes   Negative: Sounds like a life-threatening emergency to the triager   Negative: Previous asthma attacks and this feels like asthma attack   Negative: Dry cough (non-productive; no sputum or minimal clear sputum) and within 14 days of COVID-19 Exposure   Negative: MODERATE difficulty breathing (e.g., speaks in phrases, SOB even at rest, pulse 100-120) and still present when not coughing   Negative: Chest pain present when not coughing   Negative: Passed out (i.e., fainted, collapsed and was not responding)   Negative: Patient sounds very sick or weak to the triager   Negative: MILD difficulty breathing (e.g., minimal/no SOB at rest, SOB with walking, pulse <100) and still present when not coughing   Negative: Coughed up > 1 tablespoon (15 ml) blood (Exception: Blood-tinged sputum.)   Negative: Fever > 103 F (39.4 C)   Negative: Fever > 101 F (38.3 C) and over 60 years of age   Negative:  Fever > 100.0 F (37.8 C) and has diabetes mellitus or a weak immune system (e.g., HIV positive, cancer chemotherapy, organ transplant, splenectomy, chronic steroids)   Negative: Fever > 100.0 F (37.8 C) and bedridden (e.g., CVA, chronic illness, recovering from surgery)   Negative: Increasing ankle swelling   Negative: Wheezing is present    Protocols used: Cough-A-OH

## 2024-04-10 NOTE — PATIENT INSTRUCTIONS
Afrin nasal spray:  2 sprays both nostrils every morning and about 30 minutes before bedtime at night for up to 3 days in a row.  Do not use for more than 3 days straight.  Can restart after taking at least two days off.    Honey for cough, either in standard form or in a cough drop.    Drink plenty of fluids and rest as much as you're able.

## 2024-04-11 LAB
ABO/RH(D): NORMAL
ANTIBODY SCREEN: NEGATIVE
SPECIMEN EXPIRATION DATE: NORMAL

## 2024-04-12 ENCOUNTER — LAB (OUTPATIENT)
Dept: LAB | Facility: CLINIC | Age: 34
End: 2024-04-12
Payer: COMMERCIAL

## 2024-04-12 ENCOUNTER — ANCILLARY PROCEDURE (OUTPATIENT)
Dept: ULTRASOUND IMAGING | Facility: CLINIC | Age: 34
End: 2024-04-12
Payer: COMMERCIAL

## 2024-04-12 DIAGNOSIS — Z34.90 SUPERVISION OF NORMAL PREGNANCY: ICD-10-CM

## 2024-04-12 LAB
ERYTHROCYTE [DISTWIDTH] IN BLOOD BY AUTOMATED COUNT: 11.2 % (ref 10–15)
HBV SURFACE AG SERPL QL IA: NONREACTIVE
HCT VFR BLD AUTO: 42 % (ref 35–47)
HCV AB SERPL QL IA: NONREACTIVE
HGB BLD-MCNC: 14.5 G/DL (ref 11.7–15.7)
HIV 1+2 AB+HIV1 P24 AG SERPL QL IA: NONREACTIVE
MCH RBC QN AUTO: 32 PG (ref 26.5–33)
MCHC RBC AUTO-ENTMCNC: 34.5 G/DL (ref 31.5–36.5)
MCV RBC AUTO: 93 FL (ref 78–100)
PLATELET # BLD AUTO: 241 10E3/UL (ref 150–450)
RBC # BLD AUTO: 4.53 10E6/UL (ref 3.8–5.2)
RUBV IGG SERPL QL IA: 4.16 INDEX
RUBV IGG SERPL QL IA: POSITIVE
T PALLIDUM AB SER QL: NONREACTIVE
WBC # BLD AUTO: 9.6 10E3/UL (ref 4–11)

## 2024-04-12 PROCEDURE — 87389 HIV-1 AG W/HIV-1&-2 AB AG IA: CPT

## 2024-04-12 PROCEDURE — 87086 URINE CULTURE/COLONY COUNT: CPT

## 2024-04-12 PROCEDURE — 86762 RUBELLA ANTIBODY: CPT

## 2024-04-12 PROCEDURE — 86850 RBC ANTIBODY SCREEN: CPT

## 2024-04-12 PROCEDURE — 76801 OB US < 14 WKS SINGLE FETUS: CPT | Performed by: OBSTETRICS & GYNECOLOGY

## 2024-04-12 PROCEDURE — 36415 COLL VENOUS BLD VENIPUNCTURE: CPT

## 2024-04-12 PROCEDURE — 85027 COMPLETE CBC AUTOMATED: CPT

## 2024-04-12 PROCEDURE — 86803 HEPATITIS C AB TEST: CPT

## 2024-04-12 PROCEDURE — 86900 BLOOD TYPING SEROLOGIC ABO: CPT

## 2024-04-12 PROCEDURE — 86780 TREPONEMA PALLIDUM: CPT

## 2024-04-12 PROCEDURE — 86901 BLOOD TYPING SEROLOGIC RH(D): CPT

## 2024-04-12 PROCEDURE — 76817 TRANSVAGINAL US OBSTETRIC: CPT | Performed by: OBSTETRICS & GYNECOLOGY

## 2024-04-12 PROCEDURE — 87340 HEPATITIS B SURFACE AG IA: CPT

## 2024-04-13 LAB — BACTERIA UR CULT: NORMAL

## 2024-04-30 ENCOUNTER — TRANSCRIBE ORDERS (OUTPATIENT)
Dept: MATERNAL FETAL MEDICINE | Facility: CLINIC | Age: 34
End: 2024-04-30

## 2024-04-30 ENCOUNTER — PRENATAL OFFICE VISIT (OUTPATIENT)
Dept: OBGYN | Facility: CLINIC | Age: 34
End: 2024-04-30
Payer: COMMERCIAL

## 2024-04-30 VITALS — DIASTOLIC BLOOD PRESSURE: 60 MMHG | WEIGHT: 179 LBS | BODY MASS INDEX: 27.62 KG/M2 | SYSTOLIC BLOOD PRESSURE: 92 MMHG

## 2024-04-30 DIAGNOSIS — Z34.81 ENCOUNTER FOR SUPERVISION OF OTHER NORMAL PREGNANCY IN FIRST TRIMESTER: ICD-10-CM

## 2024-04-30 DIAGNOSIS — O26.90 PREGNANCY RELATED CONDITION, ANTEPARTUM: Primary | ICD-10-CM

## 2024-04-30 DIAGNOSIS — F32.A DEPRESSION AFFECTING PREGNANCY, ANTEPARTUM: Primary | ICD-10-CM

## 2024-04-30 DIAGNOSIS — O99.340 DEPRESSION AFFECTING PREGNANCY, ANTEPARTUM: Primary | ICD-10-CM

## 2024-04-30 PROCEDURE — 87491 CHLMYD TRACH DNA AMP PROBE: CPT | Performed by: OBSTETRICS & GYNECOLOGY

## 2024-04-30 PROCEDURE — 99213 OFFICE O/P EST LOW 20 MIN: CPT | Performed by: OBSTETRICS & GYNECOLOGY

## 2024-04-30 PROCEDURE — 87591 N.GONORRHOEAE DNA AMP PROB: CPT | Performed by: OBSTETRICS & GYNECOLOGY

## 2024-04-30 RX ORDER — LAMOTRIGINE 100 MG/1
100 TABLET ORAL DAILY
Qty: 90 TABLET | Refills: 1 | Status: SHIPPED | OUTPATIENT
Start: 2024-04-30

## 2024-04-30 ASSESSMENT — ANXIETY QUESTIONNAIRES
3. WORRYING TOO MUCH ABOUT DIFFERENT THINGS: NOT AT ALL
2. NOT BEING ABLE TO STOP OR CONTROL WORRYING: NOT AT ALL
IF YOU CHECKED OFF ANY PROBLEMS ON THIS QUESTIONNAIRE, HOW DIFFICULT HAVE THESE PROBLEMS MADE IT FOR YOU TO DO YOUR WORK, TAKE CARE OF THINGS AT HOME, OR GET ALONG WITH OTHER PEOPLE: NOT DIFFICULT AT ALL
5. BEING SO RESTLESS THAT IT IS HARD TO SIT STILL: NOT AT ALL
1. FEELING NERVOUS, ANXIOUS, OR ON EDGE: SEVERAL DAYS
7. FEELING AFRAID AS IF SOMETHING AWFUL MIGHT HAPPEN: NOT AT ALL
GAD7 TOTAL SCORE: 1
GAD7 TOTAL SCORE: 1
6. BECOMING EASILY ANNOYED OR IRRITABLE: NOT AT ALL

## 2024-04-30 ASSESSMENT — PATIENT HEALTH QUESTIONNAIRE - PHQ9
SUM OF ALL RESPONSES TO PHQ QUESTIONS 1-9: 1
5. POOR APPETITE OR OVEREATING: NOT AT ALL

## 2024-04-30 NOTE — PROGRESS NOTES
New OB Visit  Kaci Davis   2024   9w4d     Subjective: Kaci Davis 33 year old  at 9w4d dated by early ultrasound at 7wks on  here today for initial OB visit.Estimated Date of Delivery: 2024. Patient reports No Problems. Denies cramping and vaginal spotting.     Gyn History:   Menses: LMP: Patient's last menstrual period was 02/15/2024 (exact date). frequency: q month  Sexually transmitted disease history: none.    Occupation:   Exercise: active  Diet: as tolerated  H/o Chicken Pox or Varicella Vaccination: Yes    no prior history of hypertension, DM, asthma.    History of GDM: No   Hx PTL : No   History of HTN in pregnancy: No   Shoulder dystocia: No   Vacuum Extraction: No   PPH: No   3rd of 4th degree laceration: No   Other complications: Depression in pregnancy, on Lamictal and fluoxetine    Since her last LMP she denies use of alcohol, tobacco and street drugs.    OBhx:  x 1  OB History    Para Term  AB Living   2 1 1 0 0 1   SAB IAB Ectopic Multiple Live Births   0 0 0 0 1      # Outcome Date GA Lbr Luis/2nd Weight Sex Type Anes PTL Lv   2 Current            1 Term 23 38w5d 01:10 :46 3.5 kg (7 lb 11.5 oz) F Vag-Spont EPI N JOANNE      Name: Yocasta      Apgar1: 7  Apgar5: 8       ROS: Ten point review of systems was reviewed and negative except the above.    HISTORY:  Past Medical History:   Diagnosis Date    Urinary tract infection     Varicella      Past Surgical History:   Procedure Laterality Date    KNEE SURGERY Bilateral     repair of both knees    LEG SURGERY Right 2016    r leg     Family History   Problem Relation Age of Onset    Breast Cancer Mother     Hypertension Mother     Diabetes Father     Bipolar Disorder Sister     No Known Problems Paternal Half-Brother     No Known Problems Maternal Grandmother     Diabetes Maternal Grandfather     No Known Problems Paternal Half-Sister     No Known Problems Paternal  Half-Sister      Social History     Socioeconomic History    Marital status:      Spouse name: Samuel    Number of children: 1    Years of education: None    Highest education level: None   Occupational History    Occupation:    Tobacco Use    Smoking status: Never    Smokeless tobacco: Never   Vaping Use    Vaping status: Never Used   Substance and Sexual Activity    Alcohol use: Not Currently    Drug use: Never    Sexual activity: Yes     Partners: Male     Social Determinants of Health     Financial Resource Strain: Low Risk  (9/28/2023)    Financial Resource Strain     Within the past 12 months, have you or your family members you live with been unable to get utilities (heat, electricity) when it was really needed?: No   Food Insecurity: Low Risk  (9/28/2023)    Food Insecurity     Within the past 12 months, did you worry that your food would run out before you got money to buy more?: No     Within the past 12 months, did the food you bought just not last and you didn t have money to get more?: No   Transportation Needs: Low Risk  (9/28/2023)    Transportation Needs     Within the past 12 months, has lack of transportation kept you from medical appointments, getting your medicines, non-medical meetings or appointments, work, or from getting things that you need?: No   Physical Activity: Unknown (9/28/2023)    Exercise Vital Sign     Days of Exercise per Week: 4 days   Stress: Stress Concern Present (9/28/2023)    [employer] [place] [employer] - Occupational Stress Questionnaire     Feeling of Stress : Rather much   Social Connections: Unknown (9/28/2023)    Social Connection and Isolation Panel [NHANES]     Frequency of Communication with Friends and Family: Three times a week     Frequency of Social Gatherings with Friends and Family: Twice a week     Active Member of Clubs or Organizations: Yes     Attends Club or Organization Meetings: More than 4 times per year      Marital Status:    Interpersonal Safety: Low Risk  (9/28/2023)    Interpersonal Safety     Do you feel physically and emotionally safe where you currently live?: Yes     Within the past 12 months, have you been hit, slapped, kicked or otherwise physically hurt by someone?: No     Within the past 12 months, have you been humiliated or emotionally abused in other ways by your partner or ex-partner?: No   Housing Stability: Low Risk  (9/28/2023)    Housing Stability     Do you have housing? : Yes     Are you worried about losing your housing?: No     Current Outpatient Medications   Medication Sig Dispense Refill    FLUoxetine (PROZAC) 40 MG capsule       lamoTRIgine (LAMICTAL) 100 MG tablet Take 1 tablet (100 mg) by mouth daily 90 tablet 1    Prenatal Vit-Fe Fumarate-FA (PRENATAL PO)        No current facility-administered medications for this visit.     Allergies   Allergen Reactions    Amoxicillin-Pot Clavulanate Hives    Amoxicillin-Pot Clavulanate Hives and Rash       Past medical, surgical, social and family history were reviewed and updated in EPIC.      EXAM:  BP 92/60   Wt 81.2 kg (179 lb)   LMP 02/15/2024 (Exact Date)   BMI 27.62 kg/m       Gen:  no acute distress, comfortable  HENT: No scleral injection or icterus  GI: Abdomen soft, non-tender. No masses, organomegaly  Skin: No suspicious lesions or rashes  Psychiatric: mentation appears normal and affect bright   Pelvis: External genitalia within normal limits. Urethra is without lesion. Bladder is nontender.    On speculum exam, cervix is without lesion and vagina is normal without lesion or discharge. Pap smear not obtained as is up to date.  GC/Chlamydia PCR obtained  +FHT present on bedside U/S      Recent Labs   Lab Test 04/12/24  1025   AS Negative     Rhogam not indicated     Recent Labs   Lab Test 04/12/24  1025   HEPBANG Nonreactive   HIAGAB Nonreactive   RUQIGG Positive       Treponemal antibody neg    CBC RESULTS:   Recent Labs   Lab  Test 24  1025   WBC 9.6   RBC 4.53   HGB 14.5   HCT 42.0   MCV 93   MCH 32.0   MCHC 34.5   RDW 11.2          ASSESSMENT:  33 year old  at 9w4d dated by 7wk angi here for NOB visit.    Depression on lamictal and fluoxetine.  Needs to find mental health provider for ongoing management    PLAN:    1)Prenatal labs reviewed. She has no questions.  2) EDUCATION : RECOMMENDED WEIGHT GAIN: 25-35 lbs given Body mass index is 27.62 kg/m ..   - Instructed on best evidence for: healthy diet and foods to avoid; exercise and activity during pregnancy; and maintenance of a generally healthy lifestyle. Reviewed early pregnancy education, provider coverage, labor and delivery, and prenatal visits.  Discussed the harms, benefits, side effects and alternative therapies for current prescribed and OTC medications.  - recommend PNV  3) Discussed options for screening for chromosomal anomalies, including first screen, noninvasive prenatal testing, CVS/amniocentesis, quad screen, and ultrasound at 18-20 weeks. She is electing noninvasive prenatal testing.  4) M referral for L2 U/S given use of selective serotonin reuptake inhibitor in pregnancy (as was done last pregnancy)    Follow up in 4 weeks. She is encouraged to call sooner with questions or concerns.    Vinicio Whitney MD   Obstetrics and Gynecology

## 2024-05-01 ENCOUNTER — TELEPHONE (OUTPATIENT)
Dept: BEHAVIORAL HEALTH | Facility: CLINIC | Age: 34
End: 2024-05-01
Payer: COMMERCIAL

## 2024-05-01 LAB
C TRACH DNA SPEC QL NAA+PROBE: NEGATIVE
N GONORRHOEA DNA SPEC QL NAA+PROBE: NEGATIVE

## 2024-05-01 NOTE — TELEPHONE ENCOUNTER
Phone Encounter     Patient had appointment with her OBGYN care team. [facility] services were requested. No immediate safety/risk issues were reported or identified. Explained the role of the [facility] and provided informational handout and contact information for the [facility].     [facility] for a couple years now just for medication refills. She has been on the same medications for years now. She doesn't want any changes and would like to transfer to a new prescriber. Discussed transferring to another psychiatrist at [facility] as a possibility or connecting with her PCP in [place] to request they take over prescribing. Pt stated understanding of both options.    Discussed overall mental health. Pt shared she has felt good on her current medications for a longtime now. She felt good through her last pregnancy and postpartum. [facility] invited to make an appointment in the future is any need arises.    Daniela Zamudio [facility], Behavioral Health Clinician

## 2024-05-07 ENCOUNTER — ALLIED HEALTH/NURSE VISIT (OUTPATIENT)
Dept: OBGYN | Facility: CLINIC | Age: 34
End: 2024-05-07
Payer: COMMERCIAL

## 2024-05-07 VITALS — BODY MASS INDEX: 26.8 KG/M2 | HEIGHT: 68 IN | WEIGHT: 176.8 LBS

## 2024-05-07 DIAGNOSIS — Z34.81 ENCOUNTER FOR SUPERVISION OF OTHER NORMAL PREGNANCY IN FIRST TRIMESTER: Primary | ICD-10-CM

## 2024-05-07 PROCEDURE — 99207 PR NO CHARGE NURSE ONLY: CPT

## 2024-05-07 PROCEDURE — 36415 COLL VENOUS BLD VENIPUNCTURE: CPT

## 2024-05-07 NOTE — PROGRESS NOTES
Nurse only for [facility] genetic testing. Consent was signed at prenatal visit and is scanned into chart.    Paperwork completed and test ordered. Patient sent to lab.    Maryjane Torres CMA

## 2024-05-17 LAB — SCANNED LAB RESULT: NORMAL

## 2024-05-29 ENCOUNTER — PRENATAL OFFICE VISIT (OUTPATIENT)
Dept: OBGYN | Facility: CLINIC | Age: 34
End: 2024-05-29
Payer: COMMERCIAL

## 2024-05-29 VITALS — SYSTOLIC BLOOD PRESSURE: 96 MMHG | DIASTOLIC BLOOD PRESSURE: 58 MMHG | WEIGHT: 177 LBS | BODY MASS INDEX: 27.31 KG/M2

## 2024-05-29 DIAGNOSIS — Z34.82 ENCOUNTER FOR SUPERVISION OF OTHER NORMAL PREGNANCY IN SECOND TRIMESTER: Primary | ICD-10-CM

## 2024-05-29 DIAGNOSIS — F32.A DEPRESSION AFFECTING PREGNANCY, ANTEPARTUM: ICD-10-CM

## 2024-05-29 DIAGNOSIS — O99.340 DEPRESSION AFFECTING PREGNANCY, ANTEPARTUM: ICD-10-CM

## 2024-05-29 PROCEDURE — 99207 PR PRENATAL VISIT: CPT | Performed by: OBSTETRICS & GYNECOLOGY

## 2024-05-29 NOTE — NURSING NOTE
"Chief Complaint   Patient presents with    Prenatal Care     13w 5d no concerns       Initial BP 96/58   Wt 80.3 kg (177 lb)   LMP 02/15/2024 (Exact Date)   BMI 27.31 kg/m   Estimated body mass index is 27.31 kg/m  as calculated from the following:    Height as of 24: 1.715 m (5' 7.5\").    Weight as of this encounter: 80.3 kg (177 lb).  BP completed using cuff size: regular    Questioned patient about current smoking habits.  Pt. has never smoked.        "

## 2024-06-24 ENCOUNTER — PRE VISIT (OUTPATIENT)
Dept: MATERNAL FETAL MEDICINE | Facility: CLINIC | Age: 34
End: 2024-06-24
Payer: COMMERCIAL

## 2024-06-28 ENCOUNTER — PRENATAL OFFICE VISIT (OUTPATIENT)
Dept: OBGYN | Facility: CLINIC | Age: 34
End: 2024-06-28
Payer: COMMERCIAL

## 2024-06-28 VITALS — WEIGHT: 179 LBS | BODY MASS INDEX: 27.62 KG/M2 | SYSTOLIC BLOOD PRESSURE: 100 MMHG | DIASTOLIC BLOOD PRESSURE: 56 MMHG

## 2024-06-28 DIAGNOSIS — Z34.82 ENCOUNTER FOR SUPERVISION OF OTHER NORMAL PREGNANCY IN SECOND TRIMESTER: Primary | ICD-10-CM

## 2024-06-28 PROCEDURE — 99207 PR PRENATAL VISIT: CPT | Performed by: OBSTETRICS & GYNECOLOGY

## 2024-06-28 NOTE — PROGRESS NOTES
33-year-old G2, P1 at 18 weeks gestation presents for routine visit.  Just beginning to perceive fetal movement.  As a level 2 ultrasound and MFM on 7/ due to her history of SSRI use return to clinic 4 weeks.

## 2024-07-01 ENCOUNTER — HOSPITAL ENCOUNTER (OUTPATIENT)
Dept: ULTRASOUND IMAGING | Facility: CLINIC | Age: 34
Discharge: HOME | End: 2024-07-01
Attending: OBSTETRICS & GYNECOLOGY
Payer: COMMERCIAL

## 2024-07-01 ENCOUNTER — OFFICE VISIT (OUTPATIENT)
Dept: MATERNAL FETAL MEDICINE | Facility: CLINIC | Age: 34
End: 2024-07-01
Attending: OBSTETRICS & GYNECOLOGY
Payer: COMMERCIAL

## 2024-07-01 DIAGNOSIS — O26.90 PREGNANCY RELATED CONDITION, ANTEPARTUM: ICD-10-CM

## 2024-07-01 DIAGNOSIS — O26.90 PREGNANCY RELATED CONDITION, ANTEPARTUM: Primary | ICD-10-CM

## 2024-07-01 PROCEDURE — 76811 OB US DETAILED SNGL FETUS: CPT

## 2024-07-01 PROCEDURE — 99213 OFFICE O/P EST LOW 20 MIN: CPT | Mod: 25 | Performed by: STUDENT IN AN ORGANIZED HEALTH CARE EDUCATION/TRAINING PROGRAM

## 2024-07-01 PROCEDURE — 76811 OB US DETAILED SNGL FETUS: CPT | Mod: 26 | Performed by: STUDENT IN AN ORGANIZED HEALTH CARE EDUCATION/TRAINING PROGRAM

## 2024-07-01 NOTE — NURSING NOTE
Patient reports occasionally feeling fetal movement, denies pain, denies contractions/pre-term labor, leaking of fluid, or bleeding. Education provided to patient on L2. SBAR given to DANIEL RASMUSSEN, see their note in Epic.    Sharonda Franks RN

## 2024-07-01 NOTE — PROGRESS NOTES
"Please see \"Imaging\" tab under \"Chart Review\" for details of today's visit.    Randi Evangelista    "

## 2024-07-22 ENCOUNTER — HOSPITAL ENCOUNTER (OUTPATIENT)
Dept: ULTRASOUND IMAGING | Facility: CLINIC | Age: 34
Discharge: HOME | End: 2024-07-22
Attending: STUDENT IN AN ORGANIZED HEALTH CARE EDUCATION/TRAINING PROGRAM
Payer: COMMERCIAL

## 2024-07-22 ENCOUNTER — OFFICE VISIT (OUTPATIENT)
Dept: MATERNAL FETAL MEDICINE | Facility: CLINIC | Age: 34
End: 2024-07-22
Attending: STUDENT IN AN ORGANIZED HEALTH CARE EDUCATION/TRAINING PROGRAM
Payer: COMMERCIAL

## 2024-07-22 DIAGNOSIS — O26.90 PREGNANCY RELATED CONDITION, ANTEPARTUM: ICD-10-CM

## 2024-07-22 DIAGNOSIS — O26.92 PREGNANCY RELATED CONDITION IN SECOND TRIMESTER: Primary | ICD-10-CM

## 2024-07-22 PROCEDURE — 99212 OFFICE O/P EST SF 10 MIN: CPT | Mod: 25 | Performed by: STUDENT IN AN ORGANIZED HEALTH CARE EDUCATION/TRAINING PROGRAM

## 2024-07-22 PROCEDURE — 76816 OB US FOLLOW-UP PER FETUS: CPT

## 2024-07-22 PROCEDURE — 76816 OB US FOLLOW-UP PER FETUS: CPT | Mod: 26 | Performed by: STUDENT IN AN ORGANIZED HEALTH CARE EDUCATION/TRAINING PROGRAM

## 2024-07-22 NOTE — NURSING NOTE
Patient presents to [facility] for RL2 at 21w3d due to subopt. Positive fetal movement. Denies LOF, vaginal bleeding or cramping/contractions. SBAR given to DANIEL RASMUSSEN, see their note in Epic.

## 2024-07-24 ENCOUNTER — PRENATAL OFFICE VISIT (OUTPATIENT)
Dept: OBGYN | Facility: CLINIC | Age: 34
End: 2024-07-24
Payer: COMMERCIAL

## 2024-07-24 VITALS — WEIGHT: 183 LBS | SYSTOLIC BLOOD PRESSURE: 98 MMHG | DIASTOLIC BLOOD PRESSURE: 56 MMHG | BODY MASS INDEX: 28.24 KG/M2

## 2024-07-24 DIAGNOSIS — Z34.82 ENCOUNTER FOR SUPERVISION OF OTHER NORMAL PREGNANCY IN SECOND TRIMESTER: Primary | ICD-10-CM

## 2024-07-24 PROCEDURE — 99207 PR PRENATAL VISIT: CPT | Performed by: OBSTETRICS & GYNECOLOGY

## 2024-07-24 NOTE — PROGRESS NOTES
33-year-old G2, P1 at 21 weeks 5 days.  Had ultrasound and MFM on 7/22 with completion of normal anatomic survey.  Feeling baby move .  No obstetrical concerns.  Return to clinic 4 weeks

## 2024-08-23 ENCOUNTER — PRENATAL OFFICE VISIT (OUTPATIENT)
Dept: OBGYN | Facility: CLINIC | Age: 34
End: 2024-08-23
Payer: COMMERCIAL

## 2024-08-23 VITALS — WEIGHT: 187 LBS | BODY MASS INDEX: 28.86 KG/M2 | DIASTOLIC BLOOD PRESSURE: 60 MMHG | SYSTOLIC BLOOD PRESSURE: 102 MMHG

## 2024-08-23 DIAGNOSIS — Z34.83 ENCOUNTER FOR SUPERVISION OF OTHER NORMAL PREGNANCY IN THIRD TRIMESTER: Primary | ICD-10-CM

## 2024-08-23 PROCEDURE — 99207 PR PRENATAL VISIT: CPT | Performed by: OBSTETRICS & GYNECOLOGY

## 2024-08-23 NOTE — PROGRESS NOTES
33-year-old G2, P1 at 26 weeks 0 days presents for routine visit.  Feels well.  Baby active.  Will do GCT next visit.  No concerns.  Return to clinic 2 weeks

## 2024-08-23 NOTE — NURSING NOTE
"Chief Complaint   Patient presents with    Prenatal Care   26w0d    initial /60   Wt 84.8 kg (187 lb)   LMP 02/15/2024 (Exact Date)   BMI 28.86 kg/m   Estimated body mass index is 28.86 kg/m  as calculated from the following:    Height as of 5/7/24: 1.715 m (5' 7.5\").    Weight as of this encounter: 84.8 kg (187 lb).  BP completed using cuff size regular.  Miri Rodriguez CMA    "

## 2024-09-06 ENCOUNTER — PRENATAL OFFICE VISIT (OUTPATIENT)
Dept: OBGYN | Facility: CLINIC | Age: 34
End: 2024-09-06
Payer: COMMERCIAL

## 2024-09-06 VITALS — BODY MASS INDEX: 29.16 KG/M2 | WEIGHT: 189 LBS | SYSTOLIC BLOOD PRESSURE: 100 MMHG | DIASTOLIC BLOOD PRESSURE: 56 MMHG

## 2024-09-06 DIAGNOSIS — Z34.83 ENCOUNTER FOR SUPERVISION OF OTHER NORMAL PREGNANCY IN THIRD TRIMESTER: Primary | ICD-10-CM

## 2024-09-06 LAB
ERYTHROCYTE [DISTWIDTH] IN BLOOD BY AUTOMATED COUNT: 13 % (ref 10–15)
GLUCOSE 1H P 50 G GLC PO SERPL-MCNC: 126 MG/DL (ref 70–129)
HCT VFR BLD AUTO: 35 % (ref 35–47)
HGB BLD-MCNC: 12.4 G/DL (ref 11.7–15.7)
MCH RBC QN AUTO: 33.9 PG (ref 26.5–33)
MCHC RBC AUTO-ENTMCNC: 35.4 G/DL (ref 31.5–36.5)
MCV RBC AUTO: 96 FL (ref 78–100)
PLATELET # BLD AUTO: 174 10E3/UL (ref 150–450)
RBC # BLD AUTO: 3.66 10E6/UL (ref 3.8–5.2)
WBC # BLD AUTO: 8.9 10E3/UL (ref 4–11)

## 2024-09-06 PROCEDURE — 90715 TDAP VACCINE 7 YRS/> IM: CPT | Performed by: OBSTETRICS & GYNECOLOGY

## 2024-09-06 PROCEDURE — 99207 PR PRENATAL VISIT: CPT | Performed by: OBSTETRICS & GYNECOLOGY

## 2024-09-06 PROCEDURE — 90471 IMMUNIZATION ADMIN: CPT | Performed by: OBSTETRICS & GYNECOLOGY

## 2024-09-06 PROCEDURE — 82950 GLUCOSE TEST: CPT | Performed by: OBSTETRICS & GYNECOLOGY

## 2024-09-06 PROCEDURE — 36415 COLL VENOUS BLD VENIPUNCTURE: CPT | Performed by: OBSTETRICS & GYNECOLOGY

## 2024-09-06 PROCEDURE — 86780 TREPONEMA PALLIDUM: CPT | Performed by: OBSTETRICS & GYNECOLOGY

## 2024-09-06 PROCEDURE — 85027 COMPLETE CBC AUTOMATED: CPT | Performed by: OBSTETRICS & GYNECOLOGY

## 2024-09-06 NOTE — PROGRESS NOTES
33-year-old G2, P1 at 28 weeks 0 days.  GCT, CBC, Trep and Tdap today.  Baby active.  No contractions.  Some round ligament pain discussed.  Return to clinic 2 weeks

## 2024-09-07 LAB — T PALLIDUM AB SER QL: NONREACTIVE

## 2024-09-24 ENCOUNTER — PRENATAL OFFICE VISIT (OUTPATIENT)
Dept: OBGYN | Facility: CLINIC | Age: 34
End: 2024-09-24
Payer: COMMERCIAL

## 2024-09-24 VITALS — SYSTOLIC BLOOD PRESSURE: 108 MMHG | DIASTOLIC BLOOD PRESSURE: 60 MMHG | BODY MASS INDEX: 29.47 KG/M2 | WEIGHT: 191 LBS

## 2024-09-24 DIAGNOSIS — O99.340 DEPRESSION AFFECTING PREGNANCY, ANTEPARTUM: ICD-10-CM

## 2024-09-24 DIAGNOSIS — Z23 NEED FOR PROPHYLACTIC VACCINATION AND INOCULATION AGAINST INFLUENZA: ICD-10-CM

## 2024-09-24 DIAGNOSIS — F32.A DEPRESSION AFFECTING PREGNANCY, ANTEPARTUM: ICD-10-CM

## 2024-09-24 DIAGNOSIS — Z34.83 ENCOUNTER FOR SUPERVISION OF OTHER NORMAL PREGNANCY IN THIRD TRIMESTER: Primary | ICD-10-CM

## 2024-09-24 PROCEDURE — 99207 PR PRENATAL VISIT: CPT | Performed by: OBSTETRICS & GYNECOLOGY

## 2024-09-24 PROCEDURE — 90471 IMMUNIZATION ADMIN: CPT | Performed by: OBSTETRICS & GYNECOLOGY

## 2024-09-24 PROCEDURE — 91320 SARSCV2 VAC 30MCG TRS-SUC IM: CPT | Performed by: OBSTETRICS & GYNECOLOGY

## 2024-09-24 PROCEDURE — 90480 ADMN SARSCOV2 VAC 1/ONLY CMP: CPT | Performed by: OBSTETRICS & GYNECOLOGY

## 2024-09-24 PROCEDURE — 90656 IIV3 VACC NO PRSV 0.5 ML IM: CPT | Performed by: OBSTETRICS & GYNECOLOGY

## 2024-09-24 NOTE — PROGRESS NOTES
34-year-old G2, P1 at 30 weeks 4 days.  No problems or concerns.  Past GCT.  Will be given COVID and flu vaccines today.  Return to clinic 2 weeks

## 2024-10-10 ENCOUNTER — PRENATAL OFFICE VISIT (OUTPATIENT)
Dept: OBGYN | Facility: CLINIC | Age: 34
End: 2024-10-10
Payer: COMMERCIAL

## 2024-10-10 VITALS — BODY MASS INDEX: 28.55 KG/M2 | WEIGHT: 185 LBS | SYSTOLIC BLOOD PRESSURE: 86 MMHG | DIASTOLIC BLOOD PRESSURE: 56 MMHG

## 2024-10-10 DIAGNOSIS — Z34.83 ENCOUNTER FOR SUPERVISION OF OTHER NORMAL PREGNANCY IN THIRD TRIMESTER: Primary | ICD-10-CM

## 2024-10-10 DIAGNOSIS — O99.340 DEPRESSION AFFECTING PREGNANCY, ANTEPARTUM: ICD-10-CM

## 2024-10-10 DIAGNOSIS — F32.A DEPRESSION AFFECTING PREGNANCY, ANTEPARTUM: ICD-10-CM

## 2024-10-10 PROCEDURE — 99207 PR PRENATAL VISIT: CPT | Performed by: OBSTETRICS & GYNECOLOGY

## 2024-10-10 PROCEDURE — 90678 RSV VACC PREF BIVALENT IM: CPT | Performed by: OBSTETRICS & GYNECOLOGY

## 2024-10-10 PROCEDURE — 90471 IMMUNIZATION ADMIN: CPT | Performed by: OBSTETRICS & GYNECOLOGY

## 2024-10-10 NOTE — PROGRESS NOTES
34-year-old G2, P1 at 32 weeks 6 days.  On Lamictal for anxiety.  Baby is very active.  Will receive the RSV vaccination today.  Has previously had her flu COVID and Tdap vaccines.  Return to clinic 2 weeks

## 2024-10-10 NOTE — LETTER
October 10, 2024      Kaci Davis  [address] [facility] [id number]        To Whom It May Concern,           Kaci Davis is currently pregnant with a due date of 11/29/2024.                  Sincerely,        Vinicio Whitney MD

## 2024-10-25 ENCOUNTER — PRENATAL OFFICE VISIT (OUTPATIENT)
Dept: OBGYN | Facility: CLINIC | Age: 34
End: 2024-10-25
Payer: COMMERCIAL

## 2024-10-25 VITALS — BODY MASS INDEX: 29.94 KG/M2 | WEIGHT: 194 LBS | SYSTOLIC BLOOD PRESSURE: 106 MMHG | DIASTOLIC BLOOD PRESSURE: 58 MMHG

## 2024-10-25 DIAGNOSIS — O99.340 DEPRESSION AFFECTING PREGNANCY, ANTEPARTUM: ICD-10-CM

## 2024-10-25 DIAGNOSIS — Z34.83 ENCOUNTER FOR SUPERVISION OF OTHER NORMAL PREGNANCY IN THIRD TRIMESTER: Primary | ICD-10-CM

## 2024-10-25 DIAGNOSIS — F32.A DEPRESSION AFFECTING PREGNANCY, ANTEPARTUM: ICD-10-CM

## 2024-10-25 PROCEDURE — 99207 PR PRENATAL VISIT: CPT | Performed by: OBSTETRICS & GYNECOLOGY

## 2024-10-25 RX ORDER — LAMOTRIGINE 100 MG/1
100 TABLET ORAL DAILY
Qty: 90 TABLET | Refills: 1 | Status: SHIPPED | OUTPATIENT
Start: 2024-10-25

## 2024-10-25 NOTE — NURSING NOTE
"Chief Complaint   Patient presents with    Prenatal Care     35w 0       Initial /58   Wt 88 kg (194 lb)   LMP 02/15/2024 (Exact Date)   BMI 29.94 kg/m   Estimated body mass index is 29.94 kg/m  as calculated from the following:    Height as of 24: 1.715 m (5' 7.5\").    Weight as of this encounter: 88 kg (194 lb).  BP completed using cuff size: regular    Questioned patient about current smoking habits.  Pt. has never smoked.          The following HM Due: NONE    Tuyet Lund LPN on 10/25/2024 at 4:20 PM           "

## 2024-10-25 NOTE — PROGRESS NOTES
34-year-old G2, P1 at 35 weeks 0 days.  Feeling well today.  Baby is active.  No regular contractions.  L&D discussed.  Lamictal refilled which she takes for anxiety.  Return to clinic 1 to 2 weeks

## 2024-11-01 ENCOUNTER — TELEPHONE (OUTPATIENT)
Dept: OBGYN | Facility: CLINIC | Age: 34
End: 2024-11-01
Payer: COMMERCIAL

## 2024-11-01 NOTE — TELEPHONE ENCOUNTER
Patient called with concerns of possible SROM    36w0d    She was standing earlier today and felt a small amount of fluid leak out.     It happened again about an hour later but has not happened since and she feels very dry.     She denies contractions and reports +FM.     She stated with first delivery her water broke first and contractions started later but it was a much larger amount of fluid.     Pt stated she would like to avoid L&D unless necessary to test.     Advised to do pool test at home (lay in recline position for 20-30 minutes and allow fluid to pool, stand up and note and leaking) and if positive to call back to proceed to L&D.     If no fluid noted ok to stay at home but if continues to leak later today it would be best to be seen.     Advised to also call back in she notes contractions, vaginal bleeding or has concerns about fetal movement.     Hamida Reno RN  Anupama WEIR

## 2024-11-02 ENCOUNTER — HOSPITAL ENCOUNTER (INPATIENT)
Facility: CLINIC | Age: 34
LOS: 2 days | Discharge: HOME | End: 2024-11-05
Attending: STUDENT IN AN ORGANIZED HEALTH CARE EDUCATION/TRAINING PROGRAM | Admitting: STUDENT IN AN ORGANIZED HEALTH CARE EDUCATION/TRAINING PROGRAM
Payer: COMMERCIAL

## 2024-11-02 ENCOUNTER — NURSE TRIAGE (OUTPATIENT)
Dept: NURSING | Facility: CLINIC | Age: 34
End: 2024-11-02
Payer: COMMERCIAL

## 2024-11-03 LAB
ABO/RH(D): NORMAL
ANTIBODY SCREEN: NEGATIVE
HGB BLD-MCNC: 11.6 G/DL (ref 11.7–15.7)
HGB BLD-MCNC: 13.3 G/DL (ref 11.7–15.7)
SPECIMEN EXPIRATION DATE: NORMAL
T PALLIDUM AB SER QL: NONREACTIVE

## 2024-11-03 PROCEDURE — 88307 TISSUE EXAM BY PATHOLOGIST: CPT | Mod: TC | Performed by: STUDENT IN AN ORGANIZED HEALTH CARE EDUCATION/TRAINING PROGRAM

## 2024-11-03 PROCEDURE — 86900 BLOOD TYPING SEROLOGIC ABO: CPT | Performed by: STUDENT IN AN ORGANIZED HEALTH CARE EDUCATION/TRAINING PROGRAM

## 2024-11-03 PROCEDURE — 250N000011 HC RX IP 250 OP 636: Performed by: STUDENT IN AN ORGANIZED HEALTH CARE EDUCATION/TRAINING PROGRAM

## 2024-11-03 PROCEDURE — 59400 OBSTETRICAL CARE: CPT | Performed by: STUDENT IN AN ORGANIZED HEALTH CARE EDUCATION/TRAINING PROGRAM

## 2024-11-03 PROCEDURE — 722N000001 HC LABOR CARE VAGINAL DELIVERY SINGLE

## 2024-11-03 PROCEDURE — 86780 TREPONEMA PALLIDUM: CPT | Performed by: STUDENT IN AN ORGANIZED HEALTH CARE EDUCATION/TRAINING PROGRAM

## 2024-11-03 PROCEDURE — 120N000001 HC R&B MED SURG/OB

## 2024-11-03 PROCEDURE — 999N000016 HC STATISTIC ATTENDANCE AT DELIVERY

## 2024-11-03 PROCEDURE — 88307 TISSUE EXAM BY PATHOLOGIST: CPT | Mod: 26 | Performed by: PATHOLOGY

## 2024-11-03 PROCEDURE — 86901 BLOOD TYPING SEROLOGIC RH(D): CPT | Performed by: STUDENT IN AN ORGANIZED HEALTH CARE EDUCATION/TRAINING PROGRAM

## 2024-11-03 PROCEDURE — 85018 HEMOGLOBIN: CPT | Performed by: STUDENT IN AN ORGANIZED HEALTH CARE EDUCATION/TRAINING PROGRAM

## 2024-11-03 PROCEDURE — 250N000009 HC RX 250: Performed by: STUDENT IN AN ORGANIZED HEALTH CARE EDUCATION/TRAINING PROGRAM

## 2024-11-03 PROCEDURE — 36415 COLL VENOUS BLD VENIPUNCTURE: CPT | Performed by: STUDENT IN AN ORGANIZED HEALTH CARE EDUCATION/TRAINING PROGRAM

## 2024-11-03 PROCEDURE — 0KQM0ZZ REPAIR PERINEUM MUSCLE, OPEN APPROACH: ICD-10-PCS | Performed by: STUDENT IN AN ORGANIZED HEALTH CARE EDUCATION/TRAINING PROGRAM

## 2024-11-03 PROCEDURE — 250N000013 HC RX MED GY IP 250 OP 250 PS 637: Performed by: STUDENT IN AN ORGANIZED HEALTH CARE EDUCATION/TRAINING PROGRAM

## 2024-11-03 RX ORDER — OXYTOCIN/0.9 % SODIUM CHLORIDE 30/500 ML
100-340 PLASTIC BAG, INJECTION (ML) INTRAVENOUS CONTINUOUS PRN
Status: DISCONTINUED | OUTPATIENT
Start: 2024-11-03 | End: 2024-11-04

## 2024-11-03 RX ORDER — ONDANSETRON 2 MG/ML
4 INJECTION INTRAMUSCULAR; INTRAVENOUS EVERY 6 HOURS PRN
Status: DISCONTINUED | OUTPATIENT
Start: 2024-11-03 | End: 2024-11-03 | Stop reason: HOSPADM

## 2024-11-03 RX ORDER — MODIFIED LANOLIN
OINTMENT (GRAM) TOPICAL
Status: DISCONTINUED | OUTPATIENT
Start: 2024-11-03 | End: 2024-11-05 | Stop reason: HOSPADM

## 2024-11-03 RX ORDER — MISOPROSTOL 200 UG/1
400 TABLET ORAL
Status: DISCONTINUED | OUTPATIENT
Start: 2024-11-03 | End: 2024-11-05 | Stop reason: HOSPADM

## 2024-11-03 RX ORDER — KETOROLAC TROMETHAMINE 30 MG/ML
30 INJECTION, SOLUTION INTRAMUSCULAR; INTRAVENOUS
Status: COMPLETED | OUTPATIENT
Start: 2024-11-03 | End: 2024-11-03

## 2024-11-03 RX ORDER — LIDOCAINE 40 MG/G
CREAM TOPICAL
Status: DISCONTINUED | OUTPATIENT
Start: 2024-11-03 | End: 2024-11-03 | Stop reason: HOSPADM

## 2024-11-03 RX ORDER — METHYLERGONOVINE MALEATE 0.2 MG/ML
INJECTION INTRAVENOUS
Status: COMPLETED
Start: 2024-11-03 | End: 2024-11-03

## 2024-11-03 RX ORDER — LIDOCAINE HYDROCHLORIDE 10 MG/ML
INJECTION, SOLUTION EPIDURAL; INFILTRATION; INTRACAUDAL; PERINEURAL
Status: COMPLETED
Start: 2024-11-03 | End: 2024-11-03

## 2024-11-03 RX ORDER — METOCLOPRAMIDE HYDROCHLORIDE 5 MG/ML
10 INJECTION INTRAMUSCULAR; INTRAVENOUS EVERY 6 HOURS PRN
Status: DISCONTINUED | OUTPATIENT
Start: 2024-11-03 | End: 2024-11-03 | Stop reason: HOSPADM

## 2024-11-03 RX ORDER — LOPERAMIDE HYDROCHLORIDE 2 MG/1
2 CAPSULE ORAL
Status: DISCONTINUED | OUTPATIENT
Start: 2024-11-03 | End: 2024-11-03 | Stop reason: HOSPADM

## 2024-11-03 RX ORDER — LOPERAMIDE HYDROCHLORIDE 2 MG/1
4 CAPSULE ORAL
Status: DISCONTINUED | OUTPATIENT
Start: 2024-11-03 | End: 2024-11-03 | Stop reason: HOSPADM

## 2024-11-03 RX ORDER — OXYTOCIN 10 [USP'U]/ML
10 INJECTION, SOLUTION INTRAMUSCULAR; INTRAVENOUS
Status: DISCONTINUED | OUTPATIENT
Start: 2024-11-03 | End: 2024-11-03 | Stop reason: HOSPADM

## 2024-11-03 RX ORDER — CITRIC ACID/SODIUM CITRATE 334-500MG
30 SOLUTION, ORAL ORAL
Status: DISCONTINUED | OUTPATIENT
Start: 2024-11-03 | End: 2024-11-03 | Stop reason: HOSPADM

## 2024-11-03 RX ORDER — LAMOTRIGINE 25 MG/1
100 TABLET ORAL DAILY
Status: DISCONTINUED | OUTPATIENT
Start: 2024-11-03 | End: 2024-11-05 | Stop reason: HOSPADM

## 2024-11-03 RX ORDER — IBUPROFEN 800 MG/1
800 TABLET, FILM COATED ORAL EVERY 6 HOURS PRN
Status: DISCONTINUED | OUTPATIENT
Start: 2024-11-03 | End: 2024-11-05 | Stop reason: HOSPADM

## 2024-11-03 RX ORDER — LOPERAMIDE HYDROCHLORIDE 2 MG/1
4 CAPSULE ORAL
Status: DISCONTINUED | OUTPATIENT
Start: 2024-11-03 | End: 2024-11-05 | Stop reason: HOSPADM

## 2024-11-03 RX ORDER — FENTANYL CITRATE 50 UG/ML
INJECTION, SOLUTION INTRAMUSCULAR; INTRAVENOUS
Status: COMPLETED
Start: 2024-11-03 | End: 2024-11-03

## 2024-11-03 RX ORDER — OXYTOCIN/0.9 % SODIUM CHLORIDE 30/500 ML
PLASTIC BAG, INJECTION (ML) INTRAVENOUS
Status: COMPLETED
Start: 2024-11-03 | End: 2024-11-03

## 2024-11-03 RX ORDER — CARBOPROST TROMETHAMINE 250 UG/ML
250 INJECTION, SOLUTION INTRAMUSCULAR
Status: DISCONTINUED | OUTPATIENT
Start: 2024-11-03 | End: 2024-11-03 | Stop reason: HOSPADM

## 2024-11-03 RX ORDER — NALOXONE HYDROCHLORIDE 0.4 MG/ML
0.2 INJECTION, SOLUTION INTRAMUSCULAR; INTRAVENOUS; SUBCUTANEOUS
Status: DISCONTINUED | OUTPATIENT
Start: 2024-11-03 | End: 2024-11-03 | Stop reason: HOSPADM

## 2024-11-03 RX ORDER — HYDROCORTISONE 25 MG/G
CREAM TOPICAL 3 TIMES DAILY PRN
Status: DISCONTINUED | OUTPATIENT
Start: 2024-11-03 | End: 2024-11-05 | Stop reason: HOSPADM

## 2024-11-03 RX ORDER — SODIUM CHLORIDE, SODIUM LACTATE, POTASSIUM CHLORIDE, CALCIUM CHLORIDE 600; 310; 30; 20 MG/100ML; MG/100ML; MG/100ML; MG/100ML
INJECTION, SOLUTION INTRAVENOUS CONTINUOUS
Status: DISCONTINUED | OUTPATIENT
Start: 2024-11-03 | End: 2024-11-03 | Stop reason: HOSPADM

## 2024-11-03 RX ORDER — FENTANYL CITRATE 50 UG/ML
100 INJECTION, SOLUTION INTRAMUSCULAR; INTRAVENOUS
Status: DISCONTINUED | OUTPATIENT
Start: 2024-11-03 | End: 2024-11-03 | Stop reason: HOSPADM

## 2024-11-03 RX ORDER — IBUPROFEN 800 MG/1
800 TABLET, FILM COATED ORAL
Status: COMPLETED | OUTPATIENT
Start: 2024-11-03 | End: 2024-11-03

## 2024-11-03 RX ORDER — NALOXONE HYDROCHLORIDE 0.4 MG/ML
0.4 INJECTION, SOLUTION INTRAMUSCULAR; INTRAVENOUS; SUBCUTANEOUS
Status: DISCONTINUED | OUTPATIENT
Start: 2024-11-03 | End: 2024-11-03 | Stop reason: HOSPADM

## 2024-11-03 RX ORDER — ONDANSETRON 4 MG/1
4 TABLET, ORALLY DISINTEGRATING ORAL EVERY 6 HOURS PRN
Status: DISCONTINUED | OUTPATIENT
Start: 2024-11-03 | End: 2024-11-03 | Stop reason: HOSPADM

## 2024-11-03 RX ORDER — OXYTOCIN 10 [USP'U]/ML
INJECTION, SOLUTION INTRAMUSCULAR; INTRAVENOUS
Status: DISCONTINUED
Start: 2024-11-03 | End: 2024-11-03 | Stop reason: WASHOUT

## 2024-11-03 RX ORDER — ACETAMINOPHEN 325 MG/1
650 TABLET ORAL EVERY 4 HOURS PRN
Status: DISCONTINUED | OUTPATIENT
Start: 2024-11-03 | End: 2024-11-05 | Stop reason: HOSPADM

## 2024-11-03 RX ORDER — TRANEXAMIC ACID 10 MG/ML
1 INJECTION, SOLUTION INTRAVENOUS EVERY 30 MIN PRN
Status: DISCONTINUED | OUTPATIENT
Start: 2024-11-03 | End: 2024-11-03 | Stop reason: HOSPADM

## 2024-11-03 RX ORDER — DOCUSATE SODIUM 100 MG/1
100 CAPSULE, LIQUID FILLED ORAL DAILY
Status: DISCONTINUED | OUTPATIENT
Start: 2024-11-03 | End: 2024-11-05 | Stop reason: HOSPADM

## 2024-11-03 RX ORDER — BISACODYL 10 MG
10 SUPPOSITORY, RECTAL RECTAL DAILY PRN
Status: DISCONTINUED | OUTPATIENT
Start: 2024-11-03 | End: 2024-11-05 | Stop reason: HOSPADM

## 2024-11-03 RX ORDER — METOCLOPRAMIDE 10 MG/1
10 TABLET ORAL EVERY 6 HOURS PRN
Status: DISCONTINUED | OUTPATIENT
Start: 2024-11-03 | End: 2024-11-03 | Stop reason: HOSPADM

## 2024-11-03 RX ORDER — OXYTOCIN 10 [USP'U]/ML
10 INJECTION, SOLUTION INTRAMUSCULAR; INTRAVENOUS
Status: DISCONTINUED | OUTPATIENT
Start: 2024-11-03 | End: 2024-11-05 | Stop reason: HOSPADM

## 2024-11-03 RX ORDER — OXYTOCIN 10 [USP'U]/ML
10 INJECTION, SOLUTION INTRAMUSCULAR; INTRAVENOUS
Status: DISCONTINUED | OUTPATIENT
Start: 2024-11-03 | End: 2024-11-04

## 2024-11-03 RX ORDER — MISOPROSTOL 200 UG/1
400 TABLET ORAL
Status: DISCONTINUED | OUTPATIENT
Start: 2024-11-03 | End: 2024-11-03 | Stop reason: HOSPADM

## 2024-11-03 RX ORDER — OXYTOCIN/0.9 % SODIUM CHLORIDE 30/500 ML
340 PLASTIC BAG, INJECTION (ML) INTRAVENOUS CONTINUOUS PRN
Status: DISCONTINUED | OUTPATIENT
Start: 2024-11-03 | End: 2024-11-03 | Stop reason: HOSPADM

## 2024-11-03 RX ORDER — LOPERAMIDE HYDROCHLORIDE 2 MG/1
2 CAPSULE ORAL
Status: DISCONTINUED | OUTPATIENT
Start: 2024-11-03 | End: 2024-11-05 | Stop reason: HOSPADM

## 2024-11-03 RX ORDER — PROCHLORPERAZINE MALEATE 10 MG
10 TABLET ORAL EVERY 6 HOURS PRN
Status: DISCONTINUED | OUTPATIENT
Start: 2024-11-03 | End: 2024-11-03 | Stop reason: HOSPADM

## 2024-11-03 RX ORDER — MISOPROSTOL 200 UG/1
800 TABLET ORAL
Status: DISCONTINUED | OUTPATIENT
Start: 2024-11-03 | End: 2024-11-05 | Stop reason: HOSPADM

## 2024-11-03 RX ORDER — OXYTOCIN/0.9 % SODIUM CHLORIDE 30/500 ML
340 PLASTIC BAG, INJECTION (ML) INTRAVENOUS CONTINUOUS PRN
Status: DISCONTINUED | OUTPATIENT
Start: 2024-11-03 | End: 2024-11-05 | Stop reason: HOSPADM

## 2024-11-03 RX ORDER — METHYLERGONOVINE MALEATE 0.2 MG/ML
200 INJECTION INTRAVENOUS
Status: DISCONTINUED | OUTPATIENT
Start: 2024-11-03 | End: 2024-11-03 | Stop reason: HOSPADM

## 2024-11-03 RX ORDER — CARBOPROST TROMETHAMINE 250 UG/ML
250 INJECTION, SOLUTION INTRAMUSCULAR
Status: DISCONTINUED | OUTPATIENT
Start: 2024-11-03 | End: 2024-11-05 | Stop reason: HOSPADM

## 2024-11-03 RX ORDER — TRANEXAMIC ACID 10 MG/ML
1 INJECTION, SOLUTION INTRAVENOUS EVERY 30 MIN PRN
Status: DISCONTINUED | OUTPATIENT
Start: 2024-11-03 | End: 2024-11-05 | Stop reason: HOSPADM

## 2024-11-03 RX ORDER — METHYLERGONOVINE MALEATE 0.2 MG/ML
200 INJECTION INTRAVENOUS
Status: DISCONTINUED | OUTPATIENT
Start: 2024-11-03 | End: 2024-11-05 | Stop reason: HOSPADM

## 2024-11-03 RX ORDER — MISOPROSTOL 200 UG/1
800 TABLET ORAL
Status: DISCONTINUED | OUTPATIENT
Start: 2024-11-03 | End: 2024-11-03 | Stop reason: HOSPADM

## 2024-11-03 RX ADMIN — METHYLERGONOVINE MALEATE 200 MCG: 0.2 INJECTION INTRAVENOUS at 00:25

## 2024-11-03 RX ADMIN — BENZOCAINE: 11.4 AEROSOL, SPRAY TOPICAL at 08:13

## 2024-11-03 RX ADMIN — LAMOTRIGINE 100 MG: 25 TABLET ORAL at 03:51

## 2024-11-03 RX ADMIN — ACETAMINOPHEN 650 MG: 325 TABLET, FILM COATED ORAL at 03:51

## 2024-11-03 RX ADMIN — ACETAMINOPHEN 650 MG: 325 TABLET, FILM COATED ORAL at 08:13

## 2024-11-03 RX ADMIN — FLUOXETINE HYDROCHLORIDE 40 MG: 20 CAPSULE ORAL at 03:51

## 2024-11-03 RX ADMIN — IBUPROFEN 800 MG: 800 TABLET, FILM COATED ORAL at 07:12

## 2024-11-03 RX ADMIN — FLUOXETINE HYDROCHLORIDE 40 MG: 20 CAPSULE ORAL at 21:55

## 2024-11-03 RX ADMIN — DOCUSATE SODIUM 100 MG: 100 CAPSULE, LIQUID FILLED ORAL at 08:14

## 2024-11-03 RX ADMIN — Medication 340 ML/HR: at 00:15

## 2024-11-03 RX ADMIN — KETOROLAC TROMETHAMINE 30 MG: 30 INJECTION, SOLUTION INTRAMUSCULAR at 00:58

## 2024-11-03 RX ADMIN — TRANEXAMIC ACID 1 G: 10 INJECTION, SOLUTION INTRAVENOUS at 01:50

## 2024-11-03 RX ADMIN — METHYLERGONOVINE MALEATE 200 MCG: 0.2 INJECTION, SOLUTION INTRAMUSCULAR; INTRAVENOUS at 00:25

## 2024-11-03 RX ADMIN — LAMOTRIGINE 100 MG: 25 TABLET ORAL at 21:55

## 2024-11-03 RX ADMIN — IBUPROFEN 800 MG: 800 TABLET, FILM COATED ORAL at 16:36

## 2024-11-03 RX ADMIN — ACETAMINOPHEN 650 MG: 325 TABLET, FILM COATED ORAL at 20:28

## 2024-11-03 RX ADMIN — ACETAMINOPHEN 650 MG: 325 TABLET, FILM COATED ORAL at 16:36

## 2024-11-03 RX ADMIN — LIDOCAINE HYDROCHLORIDE 20 ML: 10 INJECTION, SOLUTION EPIDURAL; INFILTRATION; INTRACAUDAL; PERINEURAL at 00:24

## 2024-11-03 RX ADMIN — FENTANYL CITRATE 100 MCG: 0.05 INJECTION, SOLUTION INTRAMUSCULAR; INTRAVENOUS at 00:18

## 2024-11-03 NOTE — L&D DELIVERY NOTE
OB Vaginal Delivery Note    Kaci Davis MRN# [id number]   Age: 34 year old YOB: 1990       GA: 36w2d  GP:   Labor Complications: None  EBL:   mL 500  Delivery QBL:  0  Delivery Type: Vaginal, Spontaneous  ROM to Delivery Time: (Delivered) Minutes: 1   Weight: 2.61 kg (5 lb 12.1 oz)   1 Minute 5 Minute 10 Minute   Apgar Totals: 6   7   8     MARIN LÓPEZ;GILLIAN MCGRATH;ROSA VALENTINE    Delivery Details:  Patient arrived and was dilated 9.5 cm. Her water spontaneously ruptured at which SVE confirmed complete dilation. She delivered precipitously after pushing over one contraction.    Kaci Davis, a 34 year old  female delivered a viable infant with apgars of 6  and 7 . Patient was fully dilated and pushing after   hours   minutes in active labor. Delivery was via vaginal, spontaneous to a sterile field under none anesthesia. Infant delivered in vertex   occiput anterior position. Anterior and posterior shoulders delivered without difficulty. The cord was clamped, cut twice and 3 vessels were noted. Cord blood was obtained in routine fashion with the following disposition: lab.      Cord complications: none  Placenta delivered at 11/3/2024 12:18 AM. Placental disposition was Pathology. Fundal massage performed and fundus found to be firm.     Episiotomy: none   Perineum, vagina, cervix were inspected, and the following lacerations were noted:   Perineal lacerations: 2nd               Any lacerations were repaired in the usual fashion using 3-0 Vicryl.    Brisk bleeding was noted. Cervix examined and no laceration seen. She was given a dose of IM methergine. With ongoing fundal massage bleeding slowed to a trickle. Excellent hemostasis was noted. Needle count correct. Infant and patient in delivery room in good and stable condition.   She received a dose of TXA a few hours after delivery for some ongoing bleeding.     Latest Reference Range & Units 24  00:36   Ph Cord Arterial 7.16 - 7.39  7.25   PCO2 Cord Arterial 35 - 71 mm Hg 51   PO2 Cord Arterial 10 - 33 mm Hg 22   Bicarbonate Cord Arterial 16 - 24 mmol/L 23   Base Excess/Deficit >-10.0 - -2.0 mmol/L -5.4   Ph Cord Blood Venous 7.21 - 7.45  7.43   PCO2 Cord Venous 27 - 57 mm Hg 35   PO2 Cord Venous 21 - 37 mm Hg 31   Bicarbonate Cord Venous 16 - 24 mmol/L 23   Base Excess/Deficit Cord Venous >-10.0 - -2.0 mmol/L -0.7 (H)   (H): Data is abnormally high    Ignacio Stover MD        Ryan, Female-Kaci [[id number]]      Labor Event Times      Latent labor onset date/time: 2024 1200    Dilation complete date: 11/3/24 Complete time: 12:10 AM   Start pushing date/time: 11/3/2024 0010          Labor Length      2nd Stage (hrs): 0 (min): 1   3rd Stage (hrs): 0 (min): 6          Labor Events     labor?: Yes   steroids: None  Labor Type: Spontaneous  Predominate monitoring during 1st stage: continuous electronic fetal monitoring     Antibiotics received during labor?: No       Rupture date/time: 11/3/24 0010   Rupture type: Spontaneous Rupture of Membranes  Fluid color: Meconium     Augmentation: None       Delivery/Placenta Date and Time      Delivery Date: 11/3/24 Delivery Time: 12:11 AM   Placenta Date/Time: 11/3/2024 12:18 AM  Oxytocin given at the time of delivery: after delivery of baby  Delivering clinician: Ignacio Stover MD   Other personnel present at delivery:  Provider Role   Beronica Rojas, RN Registered Nurse   Sebastián Rivera RN Registered Nurse   Rui Parr, RN Registered Nurse             Vaginal Counts       Initial count performed by 2 team members:  Two Team Members   ZAKIA Traore Dr.         [place] Suture Needles Sponges (RETIRED) Instruments   Initial counts 2 0 5    Added to count 0 2 0    Relief counts       Final counts 2 2 5            Placed during labor Accounted for at the end of labor   FSE No NA   IUPC No NA    Cervidil No NA                  Final count performed by 2 team members:  Two Team Members   ZAKIA [address] Dr.      Final count correct?: Yes  Pre-Birth Team Brief: Complete  Post-Birth Team Debrief: Complete       Apgars    Living status: Living   1 Minute 5 Minute 10 Minute 15 Minute 20 Minute   Skin color: 0  0  1  1  1    Heart rate: 2  2  2  2  2    Reflex irritability: 2  2  2  2  2    Muscle tone: 1  2  2  2  2    Respiratory effort: 1  1  1  2  2    Total: 6  7  8  9  9    Apgars assigned by: MARIN ARORA RN / KHANH CARTAGENA APRN,NNP-BC/CAIO SUH RN       Cord      Vessels: 3 Vessels    Cord Complications: None               Cord Blood Disposition: Lab    Delayed cord clamping?: Yes    Cord Clamping Delay (seconds):  seconds    Stem cell collection?: No           [place] Resuscitation    Methods: Oximetry, Oxygen, Temp Skin Control, Temp Skin Probe, Leonardo Puff  [place] Care at Delivery: Invited to attend this vaginal delivery by Dr. Ignacio Stover MD for this late  infant born precipitously at 36.2 weeks due to  labor. NICU arrived around 2 minutes of life. Infant with tone and grimace but no cry. Dusky undertones. Continued to dry and stimulate infant. No cry but clear breath sounds. Pulse oximetry placed with saturations in the 60s. Blow by FiO2 at 50% started. Infant with increasing saturations. FiO2 incrementally decreased to 30%. Saturations maintained in the 80s. Continued to dry and stimulate. Removed blow by around 9 minutes of life with saturations in the 90s. Infant still without a loud cry. Saturations slowly dropping. Resumed blow by with FiO2 at 30%. Saturations increasing. Eventually decreased to 21%. Infant continued to have saturations in the 90s. Removed blow by at 18 minutes of life. Stimulated infant. Weight obtained. Weak, very brief cry. Infant with desaturations and nasal flaring and mild retractions. Mask CPAP+5 at 21% FiO2 started for desaturations and  "increased work of breathing. Continued mask CPAP x 4 minutes at which point infant began to fight it and hold her breathe. Stimulated again. Infant maintaining saturations x~15 minutes without CPAP. Continued to have nasal flaring but retractions improved. Very soft and intermittent grunting. Infant placed on mother's chest for skin-to-skin cares. Parents updated. Long discussion regarding late  infants, transitioning, hypoglycemia, and thermoregulation. Also discussed the possibility of infant being transported to a different hospital if she needs NICU level care due to the NICU closed due to bed capacity and staffing. Report given to RN and encouraged her to call with any questions or concerns.      SONIDO Hill, NNP-BC 11/3/2024 1:10 AM    Output in Delivery Room: Stool       [place] Measurements      Weight: 5 lb 12.1 oz Length: 1' 6\"     Head circumference: 30.5 cm    Output in delivery room: Stool       Labor Events and Shoulder Dystocia    Fetal Tracing Prior to Delivery: Category 1  Shoulder dystocia present?: Neg       Delivery (Maternal) (Provider to Complete) ([id number])    Episiotomy: None  Perineal lacerations: 2nd Repaired?: Yes   Repair suture: 3-0 Vicryl  Genital tract inspection done: Pos       Blood Loss  Mother: Kaci Davis #[id number]     Start of Mother's Information      Delivery Blood Loss   Intrapartum & Postpartum: 24 1211 - 24 0635    Delivery Admission: 24 1211 - 24 0635         Intrapartum & Postpartum Delivery Admission    Delivery QBL (mL) Hospital Encounter 500 mL 500 mL    Total  500 mL 500 mL               End of Mother's Information  Mother: Kaci Davis #[id number]                Delivery - Provider to Complete ([id number])    Delivering clinician: Ignacio Stover MD  Delivery Type (Choose the 1 that will go to the Birth History): Vaginal, Spontaneous                         Other personnel:  Provider Role   Beronica Rojas RN " Registered Nurse   Sebastián Rivera, RN Registered Nurse   Rui Parr, RN Registered Nurse                    Placenta    Date/Time: 11/3/2024 12:18 AM  Removal: Spontaneous  Disposition: Pathology             Anesthesia    Method: None                    Presentation and Position    Presentation: Vertex     Occiput Anterior                     Ignacio Stover MD

## 2024-11-03 NOTE — PLAN OF CARE
"Data: [facility] presented to [facility]: 2024 11:51 PM. Reason for maternal/fetal assessment is uterine contractions. Patient reports she felt a gush of fluid yesterday, 2024, but decided to continue to monitor her discharge. She started having contractions today, 2024, around noon. As the day went on, they got progressively stronger and closer together, but she was no longer leaking fluid. Once she arrived to labor and delivery, they got increasingly stronger and closer together to the point she felt \"close to pushing.\" The patient's cervix was immediately checked and was found to be 9.5/100/1. Dr. Stover was in the department, informed of pt's arrival, cervical dilation, and requested at bedside. The patient was then transferred to labor and delivery [facility] via cart, accompanied by RN and MD. Once the patient was in the labor bed, an PIV was placed, and the external fetal monitor was applied. The patient's cervix was checked again by Dr. Stover and was found to have an anterior lip.Immediately thereafter, the patient had spontaneous rupture of membranes with meconium stained fluid, and her cervix was completely dilated. The patient pushed and the baby was delivered on the next contraction.   Patient is a . Prenatal record reviewed. Pregnancy has been uncomplicated.  Gestational Age 36w2d. Patient denies leaking of fluid. Support person is present.   Action: Verbal consent for EFM.   Response: Patient verbalized agreement with plan.  Beronica Rojas RN on 11/3/2024 at 12:50 AM    "

## 2024-11-03 NOTE — PROGRESS NOTES
[facility] Obstetrics Post-Partum Progress Note          Assessment and Plan:    Assessment:   Post-partum day #0  Normal spontaneous vaginal delivery  L&D complications: Precipitous labor - arrived at 9.5 cm  GBS unknown due to gestational age and no opportunity for prophylaxis        Doing well.  No excessive bleeding  Pain well-controlled.  Hgb 11.6  Baby at bedside - did not require NICU admit      Plan:   Ambulation encouraged  Pain control measures as needed  Reportable signs and symptoms dicussed with the patient  Anticipate discharge tomorrow - I did inform patient that Peds may choose to keep baby for 48hrs due to GBS concerns           Interval History:   Doing well.  Pain is well-controlled.  No fevers.  No history of foul-smelling vaginal discharge.  Good appetite.  Denies chest pain, shortness of breath, nausea or vomiting.  Vaginal bleeding is similar to a heavy menstrual flow.  Ambulatory.            Significant Problems:      Past Medical History:   Diagnosis Date    Urinary tract infection     Varicella                    Physical Exam:   All vitals stable  Patient Vitals for the past 12 hrs:   BP Temp Temp src Pulse   11/03/24 0423 105/61 98.5  F (36.9  C) Oral 69   11/03/24 0155 95/55 -- -- --   11/03/24 0141 103/60 -- -- --   11/03/24 0140 100/56 -- -- --   11/03/24 0126 98/53 -- -- --   11/03/24 0125 104/68 -- -- --   11/03/24 0111 100/55 -- -- --   11/03/24 0110 101/65 -- -- --   11/03/24 0055 111/65 -- -- --   11/03/24 0040 115/68 -- -- --   11/03/24 0025 122/70 -- -- --     Deferred exam     Vinicio Whitney MD  11/3/2024 7:07 AM

## 2024-11-03 NOTE — H&P
OB History and Physical - Update  [facility]  11/3/2024    No significant change in general health status based on examination of the patient and review of Nursing Admission Database and prenatal record.    Kaci Davis is a 34 year old  at 36w2d who presented with contractions.  Found to be 9.5 cm by RN exam in triage and immediately brought to a room. Monitors were not applied in triage.  She did then precipitously deliver after SROM. See delivery note.     SVE: 9.5 cm     Pregnancy complicated by:  - Anxiety - Lamictal and Fluoxetine  - Hx of 3rd degree laceration  - No GBS collected in this pregnancy     Plan  - Admit to L&D  - Precipitous delivery    Neal Stover MD MPH  [facility]  2024 12:02 AM

## 2024-11-03 NOTE — PLAN OF CARE
Data: Kaci Davis transferred to 425 via wheelchair at 0210. Baby transferred via parent's arms.  Action: Receiving unit notified of transfer: Yes. Patient and family notified of room change. Report given to Zohreh KOEHLER at 0210. Belongings sent to receiving unit. Accompanied by Registered Nurse. Oriented patient to surroundings. Call light within reach. ID bands double-checked with receiving RN.  Response: Patient tolerated transfer and is stable.    Patients mobililty level scored using the bedside mobility assistance tool (BMAT). Patient is at a mobility level test number: 4. Mobility equipment used: wheelchair. Required assist of 4 staff members. Further use of BMAT scoring required.

## 2024-11-03 NOTE — TELEPHONE ENCOUNTER
"OB Triage Call    Is patient's OB/Midwife with the [facility] or [facility]? [facility]- Proceed with triage     Reason for call: Laboring    Assessment:  36 weeks 1 day calling to report onset of contractions this afternoon, <6 minutes apart for 2 hours. She reports a small gush of fluid yesterday. Denies vaginal bleeding, severe weakness, confusion, or uncontrollable urge to push.    Plan: Go to L&D    Patient plans to deliver at [facility]     Patient's primary OB Provider is Dr Noriega.      Per protocol recommendations Patient to be evaluated in L&D. Patient's primary OB is [facility].  Labor and delivery at [facility] ([phone]) notified of patient's pending arrival.  Report given to Beronica.      Is patient's delivering hospital on divert? No      36w1d    Estimated Date of Delivery: 2024        OB History    Para Term  AB Living   2 1 1 0 0 1   SAB IAB Ectopic Multiple Live Births   0 0 0 0 1      # Outcome Date GA Lbr Luis/2nd Weight Sex Type Anes PTL Lv   2 Current            1 Term 23 38w5d 01:10 :46 3.5 kg (7 lb 11.5 oz) F Vag-Spont EPI N JOANNE      Name: Yocasta      Apgar1: 7  Apgar5: 8       No results found for: \"GBS\"       Theresa Bynum RN   Reason for Disposition   Pregnant < 37 weeks (i.e., )   [1] Contractions < 10 minutes apart AND [2] persist > 1 hour  (i.e., 6 or more contractions an hour)    Additional Information   Negative: Passed out (i.e., lost consciousness, collapsed and was not responding)   Negative: Shock suspected (e.g., cold/pale/clammy skin, too weak to stand, low BP, rapid pulse)   Negative: Difficult to awaken or acting confused (e.g., disoriented, slurred speech)   Negative: [1] SEVERE abdominal pain (e.g., excruciating) AND [2] constant AND [3] present > 1 hour   Negative: SEVERE bleeding (e.g., continuous red blood from vagina, or large blood clots)   Negative: Umbilical cord hanging out of the vagina (shiny, white, curled " "appearance, \"like telephone cord\")   Negative: Uncontrollable urge to push (i.e., feels like baby is coming out now)   Negative: Can see baby   Negative: Sounds like a life-threatening emergency to the triager   Negative: Passed out (i.e., lost consciousness, collapsed and was not responding)   Negative: Shock suspected (e.g., cold/pale/clammy skin, too weak to stand, low BP, rapid pulse)   Negative: Difficult to awaken or acting confused (e.g., disoriented, slurred speech)   Negative: [1] SEVERE abdominal pain (e.g., excruciating) AND [2] constant AND [3] present > 1 hour   Negative: SEVERE bleeding (e.g., continuous red blood from vagina, or large blood clots)   Negative: Umbilical cord hanging out of the vagina (shiny, white, curled appearance, \"like telephone cord\")   Negative: Uncontrollable urge to push (i.e., feels like baby is coming out now)   Negative: Can see baby   Negative: Sounds like a life-threatening emergency to the triager   Negative: MODERATE-SEVERE abdominal pain    Protocols used: Pregnancy - Labor-A-, Pregnancy - Labor - [place]-G-LF    "

## 2024-11-03 NOTE — PLAN OF CARE
"Goal Outcome Evaluation:      Plan of Care Reviewed With: patient    Overall Patient Progress: improvingOverall Patient Progress: improving         VSS, PP checks WNL. Voiding without difficulty. Steady on feet. Ice and tucks on perineum. Tylenol and ibuprofen for perineal pain. Breastfeeding and bonding well with baby. SL flushed, patent. For Hgb check this AM. Orientation to room and POC given.  present at bedside.      Problem: Adult Inpatient Plan of Care  Goal: Plan of Care Review  Description: The Plan of Care Review/Shift note should be completed every shift.  The Outcome Evaluation is a brief statement about your assessment that the patient is improving, declining, or no change.  This information will be displayed automatically on your shift  note.  Outcome: Progressing  Flowsheets (Taken 11/3/2024 0448)  Plan of Care Reviewed With: patient  Overall Patient Progress: improving  Goal: Patient-Specific Goal (Individualized)  Description: You can add care plan individualizations to a care plan. Examples of Individualization might be:  \"Parent requests to be called daily at 9am for status\", \"I have a hard time hearing out of my right ear\", or \"Do not touch me to wake me up as it startles  me\".  Outcome: Progressing  Goal: Absence of Hospital-Acquired Illness or Injury  Outcome: Progressing  Intervention: Prevent Skin Injury  Recent Flowsheet Documentation  Taken 11/3/2024 0423 by Zohreh Koehler RN  Body Position: position changed independently  Intervention: Prevent Infection  Recent Flowsheet Documentation  Taken 11/3/2024 0423 by Zohreh Koehler RN  Infection Prevention:   hand hygiene promoted   rest/sleep promoted  Goal: Optimal Comfort and Wellbeing  Outcome: Progressing  Intervention: Provide Person-Centered Care  Recent Flowsheet Documentation  Taken 11/3/2024 0423 by Zohreh Koehler RN  Trust Relationship/Rapport:   care explained   choices provided  Goal: Readiness for Transition of " Care  Outcome: Progressing     Problem: Postpartum (Vaginal Delivery)  Goal: Successful Parent Role Transition  Outcome: Progressing  Intervention: Support Parent Role Transition  Recent Flowsheet Documentation  Taken 11/3/2024 0423 by Zohreh Koehler RN  Supportive Measures: active listening utilized  Parent-Child Attachment Promotion: caring behavior modeled  Goal: Hemostasis  Outcome: Progressing  Goal: Absence of Infection Signs and Symptoms  Outcome: Progressing  Intervention: Prevent or Manage Infection  Recent Flowsheet Documentation  Taken 11/3/2024 0423 by Zohreh Koehler RN  Infection Management: aseptic technique maintained  Goal: Anesthesia/Sedation Recovery  Outcome: Progressing  Goal: Optimal Pain Control and Function  Outcome: Progressing  Intervention: Prevent or Manage Pain  Recent Flowsheet Documentation  Taken 11/3/2024 0423 by Zohreh Koehler RN  Perineal Care:   medicated pads applied   perineal hygiene encouraged   perineal spray bottle/warm water use encouraged  Goal: Effective Urinary Elimination  Outcome: Progressing

## 2024-11-03 NOTE — LACTATION NOTE
Lactation Note:   Infant was due to eat. Placed infant STS in cross cradle and football hold. Infant too sleepy to eat. Sucked on finger a couple times with colostrum but mostly disinterested. Infant kept STS. Discussed LPT infants and their needs. Reminded parents to keep infant in layers and hat on for temperature control. Encouraged  mother to call for assistance with feedings.

## 2024-11-03 NOTE — PLAN OF CARE
"Goal Outcome Evaluation:      Plan of Care Reviewed With: patient    Overall Patient Progress: improvingOverall Patient Progress: improving    Outcome Evaluation: VSS, PAIN MANAGED.    Patient meeting expected goals. Is up independent in room, meeting all personal and infant needs. Voiding without difficulty. VSS. Breastfeeding is going well and bonding well with infant. Writer assisted with latch, colostrum and swallows noted. Lactation RN will also follow up. Pain is being managed with Ibuprofen and Tylenol. Also using Tucks, ice packs for perineum discomfort and writer brought in Benzocaine buzz spray. Due to GBS status unknown, will have a 48 hour stay; plan for discharge home on 11/5. Spouse present and supportive at bedside.      Problem: Adult Inpatient Plan of Care  Goal: Plan of Care Review  Description: The Plan of Care Review/Shift note should be completed every shift.  The Outcome Evaluation is a brief statement about your assessment that the patient is improving, declining, or no change.  This information will be displayed automatically on your shift  note.  Outcome: Progressing  Flowsheets (Taken 11/3/2024 1046)  Outcome Evaluation: VSS, PAIN MANAGED.  Plan of Care Reviewed With: patient  Overall Patient Progress: improving  Goal: Patient-Specific Goal (Individualized)  Description: You can add care plan individualizations to a care plan. Examples of Individualization might be:  \"Parent requests to be called daily at 9am for status\", \"I have a hard time hearing out of my right ear\", or \"Do not touch me to wake me up as it startles  me\".  Outcome: Progressing  Goal: Absence of Hospital-Acquired Illness or Injury  Outcome: Progressing  Intervention: Prevent Skin Injury  Recent Flowsheet Documentation  Taken 11/3/2024 0813 by America Loyola, RN  Body Position: position changed independently  Intervention: Prevent and Manage VTE (Venous Thromboembolism) Risk  Recent Flowsheet Documentation  Taken " 11/3/2024 0813 by America Loyola RN  VTE Prevention/Management: (walking, moving LE's.) --  Intervention: Prevent Infection  Recent Flowsheet Documentation  Taken 11/3/2024 0813 by America Loyola RN  Infection Prevention:   rest/sleep promoted   hand hygiene promoted  Goal: Optimal Comfort and Wellbeing  Outcome: Progressing  Intervention: Monitor Pain and Promote Comfort  Recent Flowsheet Documentation  Taken 11/3/2024 0813 by America Loyola RN  Pain Management Interventions: (medicated buzz spray issued)   medication (see MAR)   cold applied   pain management plan reviewed with patient/caregiver   pillow support provided   other (see comments)  Intervention: Provide Person-Centered Care  Recent Flowsheet Documentation  Taken 11/3/2024 0813 by America Loyola RN  Trust Relationship/Rapport:   care explained   choices provided   thoughts/feelings acknowledged   reassurance provided   questions encouraged   questions answered   empathic listening provided   emotional support provided  Goal: Readiness for Transition of Care  Outcome: Progressing     Problem: Postpartum (Vaginal Delivery)  Goal: Successful Parent Role Transition  Outcome: Progressing  Intervention: Support Parent Role Transition  Recent Flowsheet Documentation  Taken 11/3/2024 0813 by America Loyola RN  Supportive Measures:   active listening utilized   decision-making supported   goal-setting facilitated   self-care encouraged   self-responsibility promoted  Parent-Child Attachment Promotion:   caring behavior modeled   interaction encouraged   strengths emphasized   positive reinforcement provided   participation in care promoted   parent/caregiver presence encouraged   cue recognition promoted   skin-to-skin contact encouraged  Goal: Hemostasis  Outcome: Progressing  Goal: Absence of Infection Signs and Symptoms  Outcome: Progressing  Goal: Anesthesia/Sedation Recovery  Outcome: Progressing  Goal:  Optimal Pain Control and Function  Outcome: Progressing  Intervention: Prevent or Manage Pain  Recent Flowsheet Documentation  Taken 11/3/2024 0813 by America Loyola RN  Pain Management Interventions: (medicated buzz spray issued)   medication (see MAR)   cold applied   pain management plan reviewed with patient/caregiver   pillow support provided   other (see comments)  Perineal Care:   absorbent brief/pad changed   cold pack/ice pack applied   medicated pads applied   perineal spray bottle/warm water use encouraged  Goal: Effective Urinary Elimination  Outcome: Progressing  Intervention: Monitor and Manage Urinary Retention  Recent Flowsheet Documentation  Taken 11/3/2024 0813 by America Loyola RN  Urinary Elimination Promotion:   absorbent pad/diaper use encouraged   frequent voiding encouraged     Problem: Comorbidity Management  Goal: Maintenance of Behavioral Health Symptom Control  Outcome: Progressing     Problem: Anxiety Signs/Symptoms  Goal: Optimized Energy Level (Anxiety Signs/Symptoms)  Outcome: Progressing  Goal: Optimized Cognitive Function (Anxiety Signs/Symptoms)  Outcome: Progressing  Goal: Improved Mood Symptoms (Anxiety Signs/Symptoms)  Outcome: Progressing  Intervention: Optimize Emotion and Mood  Recent Flowsheet Documentation  Taken 11/3/2024 0813 by America Loyola RN  Supportive Measures:   active listening utilized   decision-making supported   goal-setting facilitated   self-care encouraged   self-responsibility promoted  Goal: Improved Sleep (Anxiety Signs/Symptoms)  Outcome: Progressing  Goal: Enhanced Social, Occupational or Functional Skills (Anxiety Signs/Symptoms)  Outcome: Progressing  Intervention: Promote Social, Occupational and Functional Ability  Recent Flowsheet Documentation  Taken 11/3/2024 0813 by America Loyola RN  Trust Relationship/Rapport:   care explained   choices provided   thoughts/feelings acknowledged   reassurance provided    questions encouraged   questions answered   empathic listening provided   emotional support provided  Goal: Improved Somatic Symptoms (Anxiety Signs/Symptoms)  Outcome: Progressing

## 2024-11-04 PROCEDURE — 250N000013 HC RX MED GY IP 250 OP 250 PS 637: Performed by: STUDENT IN AN ORGANIZED HEALTH CARE EDUCATION/TRAINING PROGRAM

## 2024-11-04 PROCEDURE — 120N000001 HC R&B MED SURG/OB

## 2024-11-04 RX ADMIN — IBUPROFEN 800 MG: 800 TABLET, FILM COATED ORAL at 12:01

## 2024-11-04 RX ADMIN — DOCUSATE SODIUM 100 MG: 100 CAPSULE, LIQUID FILLED ORAL at 08:00

## 2024-11-04 RX ADMIN — ACETAMINOPHEN 650 MG: 325 TABLET, FILM COATED ORAL at 12:01

## 2024-11-04 RX ADMIN — FLUOXETINE HYDROCHLORIDE 40 MG: 20 CAPSULE ORAL at 20:56

## 2024-11-04 RX ADMIN — IBUPROFEN 800 MG: 800 TABLET, FILM COATED ORAL at 06:03

## 2024-11-04 RX ADMIN — LAMOTRIGINE 100 MG: 25 TABLET ORAL at 20:56

## 2024-11-04 RX ADMIN — ACETAMINOPHEN 650 MG: 325 TABLET, FILM COATED ORAL at 06:03

## 2024-11-04 NOTE — PROGRESS NOTES
[facility]   Post-Partum Progress Note          Assessment and Plan:    Assessment:   Post-partum day #1  Normal spontaneous vaginal delivery  L&D complications: None      Doing well.  Pumping for extra milk due to 36 week delivery and glucose issues   Ppbc- undecided, discussed options.       Plan:   Breast feeding strategies discussed  Anticipate discharge tomorrow           Interval History:     Doing well.  Pain is well-controlled.  No fevers.  No history of foul-smelling vaginal discharge.  Good appetite.  Denies chest pain, shortness of breath, nausea or vomiting.  Vaginal bleeding is similar to a heavy menstrual flow.  Ambulatory.  Breastfeeding well.          Significant Problems:    None          Review of Systems:    CONSTITUTIONAL: NEGATIVE for fever, chills, change in weight  INTEGUMENTARY/SKIN: NEGATIVE for worrisome rashes, moles or lesions  EYES: NEGATIVE for vision changes or irritation  ENT/MOUTH: NEGATIVE for ear, mouth and throat problems  RESP: NEGATIVE for significant cough or SOB  BREAST: NEGATIVE for masses, tenderness or discharge  CV: NEGATIVE for chest pain, palpitations or peripheral edema  GI: NEGATIVE for nausea, abdominal pain, heartburn, or change in bowel habits  : NEGATIVE for frequency, dysuria, or hematuria  MUSCULOSKELETAL: NEGATIVE for significant arthralgias or myalgia  NEURO: NEGATIVE for weakness, dizziness or paresthesias  ENDOCRINE: NEGATIVE for temperature intolerance, skin/hair changes  HEME: NEGATIVE for bleeding problems  PSYCHIATRIC: NEGATIVE for changes in mood or affect          Medications:   All medications related to the patient's surgery have been reviewed          Physical Exam:   All vitals stable  Patient Vitals for the past 8 hrs:   BP Temp Temp src Pulse Resp   11/04/24 0754 101/62 97.9  F (36.6  C) Oral 63 16     Uterine fundus is firm, non-tender and at the level of the umbilicus          Data:   All laboratory data related to this  surgery reviewed  Hemoglobin   Date Value Ref Range Status   11/03/2024 11.6 (L) 11.7 - 15.7 g/dL Final   11/03/2024 13.3 11.7 - 15.7 g/dL Final   09/06/2024 12.4 11.7 - 15.7 g/dL Final   04/12/2024 14.5 11.7 - 15.7 g/dL Final   03/12/2023 11.6 (L) 11.7 - 15.7 g/dL Final   09/11/2018 14.0 11.7 - 15.7 g/dL Final   08/14/2018 14.7 11.7 - 15.7 g/dL Final   07/10/2018 15.0 11.7 - 15.7 g/dL Final   06/12/2018 14.4 11.7 - 15.7 g/dL Final   04/13/2018 14.4 11.7 - 15.7 g/dL Final     No imaging studies have been ordered    Jocelyn Hines DO

## 2024-11-04 NOTE — LACTATION NOTE
Lactation in to see patient. Baby is 36 and two days gestation. Had not been supplementing and 24 hour blood sugar on infant 49 with no orders to pump, or supplement. Parents had supplemented once after low sugar. Writer in and discussed late  feeding behavior. Normal sleepiness, easy to fatigue become not as effective at breast.  Educated on rational for feeding for 15 minutes, then pumping to help bring milk in with hand expression then supplement with any ebm, and then formula which they had chose to use. Kaci engaged and agreeing to plan. Ob in and discussed feeding concerns also with LPT. Discussed stable sugars will give baby energy to continue to breast feed well. Patient states will call for latch assessment.     Writer in to observe feed. Baby latched to left breast in cross cradle. Writer assisted to widening latch by pulling down bottom lip. Baby active and some swallows heard. Encouraged to keep infant engaged by doing breast compressions. Showed technique to Kaci. After feed writer called for pump setup and use. Kaci shown how to use pump and cleaning of pump parts discussed. No questions at this time. Reminded to follow up with ebm and then formula after each breastfeeding.

## 2024-11-04 NOTE — PLAN OF CARE
"Pt VSS. Postpartum checks WDL. Is bonding well with infant. Tolerating regular diet and able to ambulate independently. Urine output adequate, voids without difficulty. Pt utilizing tylenol and ibuprofen for pain management. Breastfeeding infant every 2-3 hours, started supplementing with formula.    Problem: Adult Inpatient Plan of Care  Goal: Plan of Care Review  Description: The Plan of Care Review/Shift note should be completed every shift.  The Outcome Evaluation is a brief statement about your assessment that the patient is improving, declining, or no change.  This information will be displayed automatically on your shift  note.  Outcome: Progressing  Flowsheets (Taken 11/4/2024 0321)  Plan of Care Reviewed With: patient  Overall Patient Progress: improving  Goal: Patient-Specific Goal (Individualized)  Description: You can add care plan individualizations to a care plan. Examples of Individualization might be:  \"Parent requests to be called daily at 9am for status\", \"I have a hard time hearing out of my right ear\", or \"Do not touch me to wake me up as it startles  me\".  Outcome: Progressing  Goal: Absence of Hospital-Acquired Illness or Injury  Outcome: Progressing  Intervention: Prevent Skin Injury  Recent Flowsheet Documentation  Taken 11/4/2024 0045 by Tracy Cochran RN  Body Position: position changed independently  Taken 11/3/2024 2015 by Tracy Cochran RN  Body Position: position changed independently  Intervention: Prevent Infection  Recent Flowsheet Documentation  Taken 11/4/2024 0045 by Tracy Cochran RN  Infection Prevention:   hand hygiene promoted   rest/sleep promoted  Taken 11/3/2024 2015 by Tracy Cochran RN  Infection Prevention:   hand hygiene promoted   rest/sleep promoted  Goal: Optimal Comfort and Wellbeing  Outcome: Progressing  Intervention: Monitor Pain and Promote Comfort  Recent Flowsheet Documentation  Taken 11/3/2024 2028 by Tracy Cochran, RN  Pain Management " Interventions:   medication (see MAR)   cold applied  Intervention: Provide Person-Centered Care  Recent Flowsheet Documentation  Taken 11/4/2024 0045 by Tracy Cochran RN  Trust Relationship/Rapport:   care explained   choices provided   questions answered   questions encouraged   thoughts/feelings acknowledged  Taken 11/3/2024 2015 by Tracy Cochran, RN  Trust Relationship/Rapport:   care explained   choices provided   questions answered   questions encouraged   thoughts/feelings acknowledged  Goal: Readiness for Transition of Care  Outcome: Progressing     Problem: Postpartum (Vaginal Delivery)  Goal: Successful Parent Role Transition  Outcome: Progressing  Goal: Hemostasis  Outcome: Progressing  Goal: Absence of Infection Signs and Symptoms  Outcome: Progressing  Goal: Anesthesia/Sedation Recovery  Outcome: Progressing  Goal: Optimal Pain Control and Function  Outcome: Progressing  Intervention: Prevent or Manage Pain  Recent Flowsheet Documentation  Taken 11/3/2024 2028 by Tracy Cochran RN  Pain Management Interventions:   medication (see MAR)   cold applied  Goal: Effective Urinary Elimination  Outcome: Progressing     Problem: Comorbidity Management  Goal: Maintenance of Behavioral Health Symptom Control  Outcome: Progressing     Problem: Anxiety Signs/Symptoms  Goal: Optimized Energy Level (Anxiety Signs/Symptoms)  Outcome: Progressing  Goal: Optimized Cognitive Function (Anxiety Signs/Symptoms)  Outcome: Progressing  Goal: Improved Mood Symptoms (Anxiety Signs/Symptoms)  Outcome: Progressing  Goal: Improved Sleep (Anxiety Signs/Symptoms)  Outcome: Progressing  Goal: Enhanced Social, Occupational or Functional Skills (Anxiety Signs/Symptoms)  Outcome: Progressing  Intervention: Promote Social, Occupational and Functional Ability  Recent Flowsheet Documentation  Taken 11/4/2024 0045 by Tracy Cochran, RN  Trust Relationship/Rapport:   care explained   choices provided   questions answered   questions  encouraged   thoughts/feelings acknowledged  Taken 11/3/2024 2015 by Tracy Cochran, RN  Trust Relationship/Rapport:   care explained   choices provided   questions answered   questions encouraged   thoughts/feelings acknowledged  Goal: Improved Somatic Symptoms (Anxiety Signs/Symptoms)  Outcome: Progressing     Problem: Depression  Goal: Decreased Depression Symptoms  Outcome: Progressing  Intervention: Monitor and Manage Depressive Symptoms  Recent Flowsheet Documentation  Taken 11/4/2024 0045 by Tracy Cochran, RN  Family/Support System Care:   support provided   involvement promoted  Taken 11/3/2024 2015 by Tracy Cochran, RN  Family/Support System Care:   support provided   involvement promoted

## 2024-11-04 NOTE — PLAN OF CARE
Goal Outcome Evaluation:      Data: Vital signs within normal limits. Postpartum checks within normal limits - see flow record. Patient eating and drinking normally. Patient able to empty bladder independently and is up ambulating. No apparent signs of infection.  lacertion  healing well. Patient performing self cares and is able to care for infant.  Action: Patient medicated during the shift for pain. See MAR. Patient reassessed within 1 hour after each medication and pain was improved - patient stated she was comfortable. Patient education done about alternative methods to breastfeeding I.e formula. See flow record.  Response: Positive attachment behaviors observed with infant. Support persons spouse present.   Plan: Anticipate discharge on 11/05.     Problem: Adult Inpatient Plan of Care  Goal: Plan of Care Review  Outcome: Progressing  Goal: Patient-Specific Goal (Individualized)  Outcome: Progressing  Goal: Absence of Hospital-Acquired Illness or Injury  Outcome: Progressing  Intervention: Prevent Skin Injury  Recent Flowsheet Documentation  Taken 11/4/2024 0754 by Debi Galvan  Body Position: position changed independently  Intervention: Prevent Infection  Recent Flowsheet Documentation  Taken 11/4/2024 0754 by Debi Galvan  Infection Prevention:   hand hygiene promoted   rest/sleep promoted  Goal: Optimal Comfort and Wellbeing  Outcome: Progressing  Goal: Readiness for Transition of Care  Outcome: Progressing

## 2024-11-05 VITALS
HEART RATE: 68 BPM | RESPIRATION RATE: 18 BRPM | DIASTOLIC BLOOD PRESSURE: 63 MMHG | TEMPERATURE: 98.2 F | SYSTOLIC BLOOD PRESSURE: 107 MMHG

## 2024-11-05 LAB
PATH REPORT.COMMENTS IMP SPEC: NORMAL
PATH REPORT.COMMENTS IMP SPEC: NORMAL
PATH REPORT.FINAL DX SPEC: NORMAL
PATH REPORT.GROSS SPEC: NORMAL
PATH REPORT.MICROSCOPIC SPEC OTHER STN: NORMAL
PATH REPORT.RELEVANT HX SPEC: NORMAL
PHOTO IMAGE: NORMAL

## 2024-11-05 PROCEDURE — 250N000013 HC RX MED GY IP 250 OP 250 PS 637: Performed by: STUDENT IN AN ORGANIZED HEALTH CARE EDUCATION/TRAINING PROGRAM

## 2024-11-05 RX ADMIN — DOCUSATE SODIUM 100 MG: 100 CAPSULE, LIQUID FILLED ORAL at 08:30

## 2024-11-05 RX ADMIN — ACETAMINOPHEN 650 MG: 325 TABLET, FILM COATED ORAL at 08:31

## 2024-11-05 ASSESSMENT — ACTIVITIES OF DAILY LIVING (ADL)
ADLS_ACUITY_SCORE: 0

## 2024-11-05 NOTE — DISCHARGE SUMMARY
VAGINAL DELIVERY DISCHARGE SUMMARY    Admit date: 2024  Discharge date: 2024     Admit Dx:   34 year old  at 36w2d    Indication for care or intervention related to labor and delivery     Discharge Dx:  - Same as above, s/p     Procedures:  - Spontaneous vaginal delivery    Admit HPI:  Kaci Davis is a 34 year old  at 36w2d who presented with contractions.  Found to be 9.5 cm by RN exam in triage and immediately brought to a room. Monitors were not applied in triage.  She did then precipitously deliver after SROM. See delivery note.      SVE: 9.5 cm     Pregnancy complicated by:  - Anxiety - Lamictal and Fluoxetine  - Hx of 3rd degree laceration  - No GBS collected in this pregnancy     Plan  - Admit to L&D  - Precipitous delivery     Please see her admit H&P for full details of her PMH, PSH, Meds, Allergies and exam on admit.    Hospital course:  Patient arrived and was dilated 9.5 cm. Her water spontaneously ruptured at which SVE confirmed complete dilation. She delivered precipitously after pushing over one contraction.     Kaci Davis, a 34 year old  female delivered a viable infant with apgars of 6  and 7 . Patient was fully dilated and pushing after   hours   minutes in active labor. Delivery was via vaginal, spontaneous to a sterile field under none anesthesia. Infant delivered in vertex   occiput anterior position. Anterior and posterior shoulders delivered without difficulty. The cord was clamped, cut twice and 3 vessels were noted. Cord blood was obtained in routine fashion with the following disposition: lab.       Cord complications: none  Placenta delivered at 11/3/2024 12:18 AM. Placental disposition was Pathology. Fundal massage performed and fundus found to be firm.      Episiotomy: none   Perineum, vagina, cervix were inspected, and the following lacerations were noted:   Perineal lacerations: 2nd                Any lacerations were repaired in the usual fashion  "using 3-0 Vicryl.     Brisk bleeding was noted. Cervix examined and no laceration seen. She was given a dose of IM methergine. With ongoing fundal massage bleeding slowed to a trickle. Excellent hemostasis was noted. Needle count correct. Infant and patient in delivery room in good and stable condition.   She received a dose of TXA a few hours after delivery for some ongoing bleeding.     Please see her Delivery Summary for full details regarding her delivery.    Her postpartum course was complicated by nothing. By the time of discharge, she was meeting all of her postpartum goals and deemed stable for discharge. She was voiding without difficulty, tolerating a regular diet without nausea and vomiting, her pain was well controlled on oral pain medicines and her lochia was appropriate. She was hemodynamically stable.     Physical exam on the day of discharge:  Vitals:    11/04/24 0754 11/04/24 1539 11/05/24 0004 11/05/24 0841   BP: 101/62 98/53 110/73 107/63   BP Location: Left arm Left arm  Left arm   Patient Position: Semi-Greenfield's Semi-Greenfield's  Semi-Greenfield's   Cuff Size: Adult Regular Adult Regular Adult Regular Adult Regular   Pulse: 63 69 70 68   Resp: 16 18 16 18   Temp: 97.9  F (36.6  C) 97.9  F (36.6  C) 98.1  F (36.7  C) 98.2  F (36.8  C)   TempSrc: Oral Oral Oral Oral       General: sitting up, alert and cooperative  Abd: soft, non-distended, non-tender. Fundus firm, nontender, 2 cm below umbilicus.   Extremities: calves nontender, trace edema of lower extremities bilaterally    Lab Results   Component Value Date    HGB 11.6 11/03/2024    HGB 13.3 11/03/2024    HGB 14.0 09/11/2018    HGB 14.7 08/14/2018     Blood type: No results found for: \"RH\"    Discharge/Disposition:  Ms. Kaci Davis was discharged to home in stable condition with the following instructions/medications:  1) Call for temperature > 100.4, foul smelling vaginal discharge, bleeding > 1 pad per hour x 2 hrs, pain not controlled by oral " pain meds, thoughts of self-harm or harming the infant.  2) Contraception counseling was provided.  3) She was instructed to follow-up with her primary OB in 6 weeks for a routine postpartum visit, and in 1 week for a blood pressure check if having any blood pressure issues while hospitalized.  4) She was instructed to continue her PNV on discharge if she wished to breast feed her infant.  5) She was discharged home with the following medications:    Current Discharge Medication List        CONTINUE these medications which have NOT CHANGED    Details   FLUoxetine (PROZAC) 40 MG capsule       lamoTRIgine (LAMICTAL) 100 MG tablet Take 1 tablet (100 mg) by mouth daily.  Qty: 90 tablet, Refills: 1    Associated Diagnoses: Depression affecting pregnancy, antepartum      Prenatal Vit-Fe Fumarate-FA (PRENATAL PO)               Hollie Pickens MD, MPH  [facility]

## 2024-11-05 NOTE — PLAN OF CARE
Goal Outcome Evaluation:  All care plan goals met and reviewed with patient.    All discharge instructions were reviewed with patient by writer and all questions answered. VSS. Pain managed with Tylenol.Also using ice packs and tucks for perineum discomfort. Breastfeeding is going well and also supplementing with formula. Patient is stable and ready for discharge. All discharge instructions reviewed with patient by writer and all questions answered. Patient is aware to follow up in 6 weeks or sooner for postpartum visit. Patient left unit with infant and all belongings at 1110.

## 2024-11-05 NOTE — DISCHARGE INSTRUCTIONS
Follow up in 6 weeks for postpartum visit, or sooner for any concerns.    Warning Signs after Having a Baby    Keep this paper on your fridge or somewhere else where you can see it.    Call your provider if you have any of these symptoms up to 12 weeks after having your baby.    Thoughts of hurting yourself or your baby  Pain in your chest or trouble breathing  Severe headache not helped by pain medicine  Eyesight concerns (blurry vision, seeing spots or flashes of light, other changes to eyesight)  Fainting, shaking or other signs of a seizure    Call 9-1-1 if you feel that it is an emergency.     The symptoms below can happen to anyone after giving birth. They can be very serious. Call your provider if you have any of these warning signs.    My provider s phone number: _______________________    Losing too much blood (hemorrhage)    Call your provider if you soak through a pad in less than an hour or pass blood clots bigger than a golf ball. These may be signs that you are bleeding too much.    Blood clots in the legs or lungs    After you give birth, your body naturally clots its blood to help prevent blood loss. Sometimes this increased clotting can happen in other areas of the body, like the legs or lungs. This can block your blood flow and be very dangerous.     Call your provider if you:  Have a red, swollen spot on the back of your leg that is warm or painful when you touch it.   Are coughing up blood.     Infection    Call your provider if you have any of these symptoms:  Fever of 100.4 F (38 C) or higher.  Pain or redness around your stitches if you had an incision.   Any yellow, white, or green fluid coming from places where you had stitches or surgery.    Mood Problems (postpartum depression)    Many people feel sad or have mood changes after having a baby. But for some people, these mood swings are worse.     Call your provider right away if you feel so anxious or nervous that you can't care for  "yourself or your baby.    Preeclampsia (high blood pressure)    Even if you didn't have high blood pressure when you were pregnant, you are at risk for the high blood pressure disease called preeclampsia. This risk can last up to 12 weeks after giving birth.     Call your provider if you have:   Pain on your right side under your rib cage  Sudden swelling in the hands and face    Remember: You know your body. If something doesn't feel right, get medical help.     For informational purposes only. Not to replace the advice of your health care provider. Copyright 2020 [email or web address] [facility]. All rights reserved. Clinically reviewed by Melodie Ramirez, RNC-OB, MSN. Pop[employer]it [id number] - Rev .    Postpartum Care at Home With Your Baby: Care Instructions  Overview     After childbirth (postpartum period), your body goes through many changes as you recover. In these weeks after delivery, try to take good care of yourself. Get rest whenever you can and accept help from others.  It may take 4 to 6 weeks to feel like yourself again, and possibly longer if you had a  birth. You may feel sore or very tired as you recover. After delivery, you may continue to have contractions as the uterus returns to the size it was before your pregnancy. You will also have some vaginal bleeding. And you may have pain around the vagina as you heal. Several days after delivery you may also have pain and swelling in your breasts as they fill with milk. There are things you can do at home to help ease these discomforts.  After childbirth, it's common to feel emotional. You may feel irritable, cry easily, and feel happy one minute and sad the next. This is called the \"baby blues.\" Hormone changes are one cause of these emotional changes. These feelings usually get better within a couple of weeks. If they don't, talk to your doctor or midwife.  In the first couple of weeks after you give birth, your doctor or midwife may want to check in " with you and make a plan for follow-up care. You will likely have a complete postpartum visit in the first 3 months after delivery. At that time, your doctor or midwife will check on your recovery and see how you're doing. But if you have questions or concerns before then, you can always call your doctor or midwife.  Follow-up care is a key part of your treatment and safety. Be sure to make and go to all appointments, and call your doctor if you are having problems. It's also a good idea to know your test results and keep a list of the medicines you take.  How can you care for yourself at home?  Taking care of your body  Use pads instead of tampons for bleeding. After birth, you will have bloody vaginal discharge. You may also pass some blood clots that shouldn't be bigger than an egg. Over the next 6 weeks or so, your bleeding should decrease a little every day and slowly change to a pinkish and then whitish discharge.  For cramps or mild pain, try an over-the-counter pain medicine, such as acetaminophen (Tylenol) or ibuprofen (Advil, Motrin). Read and follow all instructions on the label.  To ease pain around the vagina or from hemorrhoids:  Put ice or a cold pack on the area for 10 to 20 minutes at a time. Put a thin cloth between the ice and your skin.  Try sitting in a few inches of warm water (sitz bath) when you can or after bowel movements.  Clean yourself with a gentle squeeze of warm water from a bottle instead of wiping with toilet paper.  Use witch hazel or hemorrhoid pads (such as Tucks).  Try using a cold compress for sore and swollen breasts. And wear a supportive bra that fits.  Ease constipation by drinking plenty of fluids and eating high-fiber foods. Ask your doctor or midwife about over-the-counter stool softeners.  Activity  Rest when you can.  Ask for help from family or friends when you need it.  If you can, have another adult in your home for at least 2 or 3 days after birth.  When you feel  ready, try to get some exercise every day. For many people, walking is a good choice. Don't do any heavy exercise until your doctor or midwife says it's okay.  Ask your doctor or midwife when it is okay to have vaginal sex.  If you don't want to get pregnant, talk to your doctor or midwife about birth control options. You can get pregnant even before your period returns. Also, you can get pregnant while you are breastfeeding.  Talk to your doctor or midwife if you want to get pregnant again. They can talk to you about when it is safe.  Emotional health  It's normal to have some sadness, anxiety, and mood swings after delivery. It may help to talk with a trusted friend or family member. You can also call the Maternal Mental Health Hotline at 4-484-[facility] (1-435.903.7348) for support. If these mood changes last more than a couple of weeks, talk to your doctor or midwife.  When should you call for help?  Share this information with your partner, family, or a friend. They can help you watch for warning signs.  Call 911  anytime you think you may need emergency care. For example, call if:    You feel you cannot stop from hurting yourself, your baby, or someone else.     You passed out (lost consciousness).     You have chest pain, are short of breath, or cough up blood.     You have a seizure.   Where to get help 24 hours a day, 7 days a week   If you or someone you know talks about suicide, self-harm, a mental health crisis, a substance use crisis, or any other kind of emotional distress, get help right away. You can:    Call the Suicide and Crisis Lifeline at 988.     Call 5-177-396-TALK (1-210.416.4037).     Text HOME to 127459 to access the Crisis Text Line.   Consider saving these numbers in your phone.  Go to [employer] for more information or to chat online.  Call your doctor or midwife now or seek immediate medical care if:    You have signs of hemorrhage (too much bleeding), such as:  Heavy vaginal  "bleeding. This means that you are soaking through one or more pads in an hour. Or you pass blood clots bigger than an egg.  Feeling dizzy or lightheaded, or you feel like you may faint.  Feeling so tired or weak that you cannot do your usual activities.  A fast or irregular heartbeat.  New or worse belly pain.     You have signs of infection, such as:  A fever.  Increased pain, swelling, warmth, or redness from an incision or wound.  Frequent or painful urination or blood in your urine.  Vaginal discharge that smells bad.  New or worse belly pain.     You have symptoms of a blood clot in your leg (called a deep vein thrombosis), such as:  Pain in the calf, back of the knee, thigh, or groin.  Swelling in the leg or groin.  A color change on the leg or groin. The skin may be reddish or purplish, depending on your usual skin color.     You have signs of preeclampsia, such as:  Sudden swelling of your face, hands, or feet.  New vision problems (such as dimness, blurring, or seeing spots).  A severe headache.     You have signs of heart failure, such as:  New or increased shortness of breath.  New or worse swelling in your legs, ankles, or feet.  Sudden weight gain, such as more than 2 to 3 pounds in a day or 5 pounds in a week.  Feeling so tired or weak that you cannot do your usual activities.     You had spinal or epidural pain relief and have:  New or worse back pain.  Increased pain, swelling, warmth, or redness at the injection site.  Tingling, weakness, or numbness in your legs or groin.   Watch closely for changes in your health, and be sure to contact your doctor or midwife if:    Your vaginal bleeding isn't decreasing.     You feel sad, anxious, or hopeless for more than a few days.     You are having problems with your breasts or breastfeeding.   Where can you learn more?  Go to https://www.healthwise.net/patiented  Enter Z768 in the search box to learn more about \"Postpartum Care at Home With Your Baby: Care " "Instructions.\"  Current as of: July 10, 2023  Content Version: 14.2 2024 [facility] [employer], [facility].   Care instructions adapted under license by your healthcare professional. If you have questions about a medical condition or this instruction, always ask your healthcare professional. Health[employer] disclaims any warranty or liability for your use of this information.      "

## 2024-11-05 NOTE — PLAN OF CARE
"Patient meeting expected outcomes. Breastfeeding ,  sleepy at times and not interested in breastfeeding. Following feeds with EBM and formula supplementation. Denies pain, declines pain medications/interventions. SO present and supportive. Independent with self and  cares. Anticipate discharge home with  today.     Problem: Adult Inpatient Plan of Care  Goal: Plan of Care Review  Description: The Plan of Care Review/Shift note should be completed every shift.  The Outcome Evaluation is a brief statement about your assessment that the patient is improving, declining, or no change.  This information will be displayed automatically on your shift  note.  Outcome: Progressing  Flowsheets (Taken 2024 0032)  Plan of Care Reviewed With: patient  Overall Patient Progress: improving  Goal: Patient-Specific Goal (Individualized)  Description: You can add care plan individualizations to a care plan. Examples of Individualization might be:  \"Parent requests to be called daily at 9am for status\", \"I have a hard time hearing out of my right ear\", or \"Do not touch me to wake me up as it startles  me\".  Outcome: Progressing  Goal: Absence of Hospital-Acquired Illness or Injury  Outcome: Progressing  Intervention: Prevent Skin Injury  Recent Flowsheet Documentation  Taken 2024 0004 by Katherine Black RN  Body Position: position changed independently  Intervention: Prevent Infection  Recent Flowsheet Documentation  Taken 2024 0004 by Katherine Black RN  Infection Prevention: hand hygiene promoted  Goal: Optimal Comfort and Wellbeing  Outcome: Progressing  Intervention: Provide Person-Centered Care  Recent Flowsheet Documentation  Taken 2024 0004 by Katherine Black RN  Trust Relationship/Rapport:   care explained   choices provided   questions encouraged   questions answered  Goal: Readiness for Transition of Care  Outcome: Progressing     Problem: Postpartum (Vaginal " Delivery)  Goal: Successful Parent Role Transition  Outcome: Progressing  Goal: Hemostasis  Outcome: Progressing  Goal: Absence of Infection Signs and Symptoms  Outcome: Progressing  Goal: Anesthesia/Sedation Recovery  Outcome: Progressing  Goal: Optimal Pain Control and Function  Outcome: Progressing  Goal: Effective Urinary Elimination  Outcome: Progressing     Problem: Comorbidity Management  Goal: Maintenance of Behavioral Health Symptom Control  Outcome: Progressing     Problem: Anxiety Signs/Symptoms  Goal: Optimized Energy Level (Anxiety Signs/Symptoms)  Outcome: Progressing  Goal: Optimized Cognitive Function (Anxiety Signs/Symptoms)  Outcome: Progressing  Goal: Improved Mood Symptoms (Anxiety Signs/Symptoms)  Outcome: Progressing  Goal: Improved Sleep (Anxiety Signs/Symptoms)  Outcome: Progressing  Goal: Enhanced Social, Occupational or Functional Skills (Anxiety Signs/Symptoms)  Outcome: Progressing  Intervention: Promote Social, Occupational and Functional Ability  Recent Flowsheet Documentation  Taken 11/5/2024 0004 by Katherine Black RN  Trust Relationship/Rapport:   care explained   choices provided   questions encouraged   questions answered  Goal: Improved Somatic Symptoms (Anxiety Signs/Symptoms)  Outcome: Progressing     Problem: Depression  Goal: Decreased Depression Symptoms  Outcome: Progressing  Intervention: Monitor and Manage Depressive Symptoms  Recent Flowsheet Documentation  Taken 11/5/2024 0004 by Katherine Black RN  Family/Support System Care: support provided   Goal Outcome Evaluation:      Plan of Care Reviewed With: patient    Overall Patient Progress: improvingOverall Patient Progress: improving

## 2024-11-23 ENCOUNTER — HEALTH MAINTENANCE LETTER (OUTPATIENT)
Age: 34
End: 2024-11-23

## 2024-12-02 ENCOUNTER — MEDICAL CORRESPONDENCE (OUTPATIENT)
Dept: HEALTH INFORMATION MANAGEMENT | Facility: CLINIC | Age: 34
End: 2024-12-02
Payer: COMMERCIAL

## 2025-01-06 ENCOUNTER — MEDICAL CORRESPONDENCE (OUTPATIENT)
Dept: HEALTH INFORMATION MANAGEMENT | Facility: CLINIC | Age: 35
End: 2025-01-06
Payer: COMMERCIAL

## 2025-01-08 ENCOUNTER — PRENATAL OFFICE VISIT (OUTPATIENT)
Dept: OBGYN | Facility: CLINIC | Age: 35
End: 2025-01-08
Payer: COMMERCIAL

## 2025-01-08 VITALS — SYSTOLIC BLOOD PRESSURE: 100 MMHG | WEIGHT: 178 LBS | DIASTOLIC BLOOD PRESSURE: 60 MMHG | BODY MASS INDEX: 27.47 KG/M2

## 2025-01-08 DIAGNOSIS — Z30.430 ENCOUNTER FOR INSERTION OF INTRAUTERINE CONTRACEPTIVE DEVICE: ICD-10-CM

## 2025-01-08 DIAGNOSIS — Z30.430 ENCOUNTER FOR INSERTION OF MIRENA IUD: ICD-10-CM

## 2025-01-08 PROCEDURE — 58300 INSERT INTRAUTERINE DEVICE: CPT | Performed by: OBSTETRICS & GYNECOLOGY

## 2025-01-08 PROCEDURE — 99207 PR POST PARTUM EXAM: CPT | Performed by: OBSTETRICS & GYNECOLOGY

## 2025-01-08 ASSESSMENT — EDINBURGH POSTNATAL DEPRESSION SCALE (EPDS)
I HAVE BEEN SO UNHAPPY THAT I HAVE HAD DIFFICULTY SLEEPING: NOT AT ALL
I HAVE BLAMED MYSELF UNNECESSARILY WHEN THINGS WENT WRONG: NO, NEVER
I HAVE BEEN SO UNHAPPY THAT I HAVE BEEN CRYING: NO, NEVER
I HAVE LOOKED FORWARD WITH ENJOYMENT TO THINGS: AS MUCH AS I EVER DID
I HAVE BEEN ANXIOUS OR WORRIED FOR NO GOOD REASON: HARDLY EVER
I HAVE FELT SCARED OR PANICKY FOR NO GOOD REASON: NO, NOT MUCH
I HAVE BLAMED MYSELF UNNECESSARILY WHEN THINGS WENT WRONG: NO, NEVER
I HAVE FELT SAD OR MISERABLE: NO, NOT AT ALL
THE THOUGHT OF HARMING MYSELF HAS OCCURRED TO ME: NEVER
I HAVE BEEN SO UNHAPPY THAT I HAVE BEEN CRYING: NO, NEVER
I HAVE BEEN ABLE TO LAUGH AND SEE THE FUNNY SIDE OF THINGS: AS MUCH AS I ALWAYS COULD
I HAVE BEEN ABLE TO LAUGH AND SEE THE FUNNY SIDE OF THINGS: AS MUCH AS I ALWAYS COULD
THE THOUGHT OF HARMING MYSELF HAS OCCURRED TO ME: NEVER
I HAVE BEEN SO UNHAPPY THAT I HAVE HAD DIFFICULTY SLEEPING: NOT AT ALL
I HAVE FELT SCARED OR PANICKY FOR NO GOOD REASON: NO, NOT MUCH
THINGS HAVE BEEN GETTING ON TOP OF ME: NO, I HAVE BEEN COPING AS WELL AS EVER
TOTAL SCORE: 2
THINGS HAVE BEEN GETTING ON TOP OF ME: NO, I HAVE BEEN COPING AS WELL AS EVER
I HAVE BEEN ANXIOUS OR WORRIED FOR NO GOOD REASON: HARDLY EVER
I HAVE FELT SAD OR MISERABLE: NO, NOT AT ALL
I HAVE LOOKED FORWARD WITH ENJOYMENT TO THINGS: AS MUCH AS I EVER DID

## 2025-01-08 NOTE — NURSING NOTE
"Chief Complaint   Patient presents with    Postpartum Care     Iud insertion   Baby Ena, born 11/3/2022    Initial /60 (BP Location: Left arm, Patient Position: Chair, Cuff Size: Adult Large)   Wt 80.7 kg (178 lb)   LMP 02/15/2024 (Exact Date)   Breastfeeding Yes   BMI 27.47 kg/m   Estimated body mass index is 27.47 kg/m  as calculated from the following:    Height as of 24: 1.715 m (5' 7.5\").    Weight as of this encounter: 80.7 kg (178 lb).  BP completed using cuff size: large    Questioned patient about current smoking habits.  Pt. has never smoked.          The following HM Due: NONE  Savannah Peoples CMA        "

## 2025-01-08 NOTE — PROGRESS NOTES
SUBJECTIVE:  Kaci Davis,  is here for a postpartum visit.  She had a  on 24 delivering a healthy baby girl, weighing 5 lbs 12 oz at term.      HPI:  Patient presented at 9-1/2 cm dilated and delivered quickly.    Last PHQ-9 score on record=       2024     2:42 PM   PHQ-9 SCORE   PHQ-9 Total Score 1         2024     2:42 PM   JUAN CARLOS-7 SCORE   Total Score 1       Delivery complications:  Yes, precipitous delivery  Breast feeding:  Yes, going well  Bladder problems:  No  Bowel problems/hemorrhoids:  No  Episiotomy/laceration/incision healed? Yes: 2nd degree  Vaginal flow:  None  [place]:  No  Contraception: IUD  Emotional adjustment:  doing well and happy  Back to work:      12 point review of systems negative other than symptoms noted below.    OBJECTIVE:  Vitals: LMP 02/15/2024 (Exact Date)   BMI= There is no height or weight on file to calculate BMI.  General - pleasant female in no acute distress.  Breast -  deferred  Abdomen - No incision  Pelvic - EG: normal adult female, BUS: within normal limits, Vagina: well rugated, no discharge, Cervix: no lesions or CMT, Uterus: firm, normal sized and nontender, anteverted in position. Adnexae: no masses or tenderness.  Rectovaginal - deferred.    ASSESSMENT:  Postpartum follow up    PLAN:  May resume normal activities without restrictions.  Pap smear was done today.    Full counseling was provided, and all questions were answered.   Return to clinic in one year for an annual visit.   Patient desires IUD inserted today.  Has had before.  No sexual activity since delivery    Sergey Whitney MD        IUD Insertion:  CONSULT:    Is a pregnancy test required: No.  Was a consent obtained?  Yes    Subjective: Kaci Davis is a 34 year old  presents for IUD and desires Mirena type IUD.    Patient has been given the opportunity to ask questions about all forms of birth control, including all options appropriate for Kaci Davis. Discussed that no  "method of birth control, except abstinence is 100% effective against pregnancy or sexually transmitted infection.     Kaci Davis understands she may have the IUD removed at any time. IUD should be removed by a health care provider.    The entire insertion procedure was reviewed with the patient, including care after placement.    Patient's last menstrual period was 02/15/2024 (exact date). Last sexual activity: prior to delivery . No allergy to betadine or shellfish. Patient declines STD screening  No results found for: \"HCG\"      /60 (BP Location: Left arm, Patient Position: Chair, Cuff Size: Adult Large)   Wt 80.7 kg (178 lb)   LMP 02/15/2024 (Exact Date)   Breastfeeding Yes   BMI 27.47 kg/m      Pelvic Exam:   EG/BUS: normal genital architecture without lesions, erythema or abnormal secretions.   Vagina: moist, pink, rugae with physiologic discharge and secretions  Cervix: multiparous no lesions and pink, moist, closed, without lesion or CMT  Uterus: anteverted position, mobile, no pain  Adnexa: within normal limits and no masses, nodularity, tenderness    PROCEDURE NOTE: -- IUD Insertion    Reason for Insertion: contraception      Under sterile technique, cervix was visualized with speculum and prepped with Betadine solution swab x 3.  The uterus was gently straightened and sounded to 8.5 cm. IUD prepared for placement, and IUD inserted according to 's instructions without difficulty or significant resitance, and deployed at the fundus. The strings were visualized and trimmed to 2.5 cm from the external os. Tenaculum was removed and hemostasis noted. Speculum removed.  Patient tolerated procedure well.    EBL: minimal    Complications: none    ASSESSMENT:     ICD-10-CM    1. Routine postpartum follow-up  Z39.2       2. Encounter for insertion of intrauterine contraceptive device  Z30.430 levonorgestrel (MIRENA) 52 MG (20 mcg/day) IUD 1 each     INSERTION INTRAUTERINE DEVICE     " levonorgestrel (MIRENA) 52 MG (20 mcg/day) IUD 1 each     INSERTION INTRAUTERINE DEVICE             PLAN:    Given 's handouts, including when to have IUD removed, list of danger s/sx, side effects and follow up recommended. Encouraged condom use for prevention of STD. Back up contraception advised for 7 days if progestin method. Advised to call for any fever, for prolonged or severe pain or bleeding, abnormal vaginal discharge, or unable to palpate strings. She was advised to use pain medications (ibuprofen) as needed for mild to moderate pain. Advised to follow-up in clinic in 4-6 weeks for IUD string check if unable to find strings or as directed by provider.     Vinicio Whitney MD

## 2025-03-20 ENCOUNTER — MEDICAL CORRESPONDENCE (OUTPATIENT)
Dept: OBGYN | Facility: CLINIC | Age: 35
End: 2025-03-20
Payer: COMMERCIAL

## 2025-06-17 ENCOUNTER — MEDICAL CORRESPONDENCE (OUTPATIENT)
Dept: HEALTH INFORMATION MANAGEMENT | Facility: CLINIC | Age: 35
End: 2025-06-17
Payer: COMMERCIAL